# Patient Record
Sex: MALE | Race: WHITE | URBAN - METROPOLITAN AREA
[De-identification: names, ages, dates, MRNs, and addresses within clinical notes are randomized per-mention and may not be internally consistent; named-entity substitution may affect disease eponyms.]

---

## 2017-02-08 ENCOUNTER — INPATIENT (INPATIENT)
Facility: HOSPITAL | Age: 62
LOS: 1 days | Discharge: HOME | End: 2017-02-10
Attending: INTERNAL MEDICINE | Admitting: INTERNAL MEDICINE
Payer: MEDICARE

## 2017-02-08 VITALS
DIASTOLIC BLOOD PRESSURE: 94 MMHG | RESPIRATION RATE: 15 BRPM | TEMPERATURE: 99 F | WEIGHT: 270.07 LBS | HEART RATE: 82 BPM | HEIGHT: 70 IN | SYSTOLIC BLOOD PRESSURE: 140 MMHG

## 2017-02-08 DIAGNOSIS — F31.9 BIPOLAR DISORDER, UNSPECIFIED: ICD-10-CM

## 2017-02-08 DIAGNOSIS — I50.9 HEART FAILURE, UNSPECIFIED: ICD-10-CM

## 2017-02-08 DIAGNOSIS — I10 ESSENTIAL (PRIMARY) HYPERTENSION: ICD-10-CM

## 2017-02-08 DIAGNOSIS — Z98.890 OTHER SPECIFIED POSTPROCEDURAL STATES: Chronic | ICD-10-CM

## 2017-02-08 DIAGNOSIS — R07.9 CHEST PAIN, UNSPECIFIED: ICD-10-CM

## 2017-02-08 LAB
ALBUMIN SERPL ELPH-MCNC: 3.5 G/DL — SIGNIFICANT CHANGE UP (ref 3.3–5)
ALP SERPL-CCNC: 66 U/L — SIGNIFICANT CHANGE UP (ref 40–120)
ALT FLD-CCNC: 24 U/L — SIGNIFICANT CHANGE UP (ref 12–78)
ANION GAP SERPL CALC-SCNC: 9 MMOL/L — SIGNIFICANT CHANGE UP (ref 5–17)
APTT BLD: 30.7 SEC — SIGNIFICANT CHANGE UP (ref 27.5–37.4)
AST SERPL-CCNC: 25 U/L — SIGNIFICANT CHANGE UP (ref 15–37)
BASOPHILS # BLD AUTO: 0.2 K/UL — SIGNIFICANT CHANGE UP (ref 0–0.2)
BASOPHILS NFR BLD AUTO: 1.9 % — SIGNIFICANT CHANGE UP (ref 0–2)
BILIRUB SERPL-MCNC: 0.5 MG/DL — SIGNIFICANT CHANGE UP (ref 0.2–1.2)
BUN SERPL-MCNC: 18 MG/DL — SIGNIFICANT CHANGE UP (ref 7–23)
CALCIUM SERPL-MCNC: 8.9 MG/DL — SIGNIFICANT CHANGE UP (ref 8.5–10.1)
CHLORIDE SERPL-SCNC: 105 MMOL/L — SIGNIFICANT CHANGE UP (ref 96–108)
CK SERPL-CCNC: 193 U/L — SIGNIFICANT CHANGE UP (ref 26–308)
CO2 SERPL-SCNC: 26 MMOL/L — SIGNIFICANT CHANGE UP (ref 22–31)
CREAT SERPL-MCNC: 0.97 MG/DL — SIGNIFICANT CHANGE UP (ref 0.5–1.3)
D DIMER BLD IA.RAPID-MCNC: <150 NG/ML DDU — SIGNIFICANT CHANGE UP
EOSINOPHIL # BLD AUTO: 0.3 K/UL — SIGNIFICANT CHANGE UP (ref 0–0.5)
EOSINOPHIL NFR BLD AUTO: 2.7 % — SIGNIFICANT CHANGE UP (ref 0–6)
GLUCOSE SERPL-MCNC: 98 MG/DL — SIGNIFICANT CHANGE UP (ref 70–99)
HCT VFR BLD CALC: 42.7 % — SIGNIFICANT CHANGE UP (ref 39–50)
HGB BLD-MCNC: 14.3 G/DL — SIGNIFICANT CHANGE UP (ref 13–17)
INR BLD: 0.97 RATIO — SIGNIFICANT CHANGE UP (ref 0.88–1.16)
LYMPHOCYTES # BLD AUTO: 2.5 K/UL — SIGNIFICANT CHANGE UP (ref 1–3.3)
LYMPHOCYTES # BLD AUTO: 25 % — SIGNIFICANT CHANGE UP (ref 13–44)
MCHC RBC-ENTMCNC: 29.6 PG — SIGNIFICANT CHANGE UP (ref 27–34)
MCHC RBC-ENTMCNC: 33.5 GM/DL — SIGNIFICANT CHANGE UP (ref 32–36)
MCV RBC AUTO: 88.4 FL — SIGNIFICANT CHANGE UP (ref 80–100)
MONOCYTES # BLD AUTO: 0.6 K/UL — SIGNIFICANT CHANGE UP (ref 0–0.9)
MONOCYTES NFR BLD AUTO: 6.3 % — SIGNIFICANT CHANGE UP (ref 2–14)
NEUTROPHILS # BLD AUTO: 6.5 K/UL — SIGNIFICANT CHANGE UP (ref 1.8–7.4)
NEUTROPHILS NFR BLD AUTO: 64.1 % — SIGNIFICANT CHANGE UP (ref 43–77)
NT-PROBNP SERPL-SCNC: 86 PG/ML — SIGNIFICANT CHANGE UP (ref 0–125)
PLATELET # BLD AUTO: 340 K/UL — SIGNIFICANT CHANGE UP (ref 150–400)
POTASSIUM SERPL-MCNC: 4.7 MMOL/L — SIGNIFICANT CHANGE UP (ref 3.5–5.3)
POTASSIUM SERPL-SCNC: 4.7 MMOL/L — SIGNIFICANT CHANGE UP (ref 3.5–5.3)
PROT SERPL-MCNC: 6.7 GM/DL — SIGNIFICANT CHANGE UP (ref 6–8.3)
PROTHROM AB SERPL-ACNC: 10.7 SEC — SIGNIFICANT CHANGE UP (ref 10–13.1)
RBC # BLD: 4.83 M/UL — SIGNIFICANT CHANGE UP (ref 4.2–5.8)
RBC # FLD: 12.3 % — SIGNIFICANT CHANGE UP (ref 10.3–14.5)
SODIUM SERPL-SCNC: 140 MMOL/L — SIGNIFICANT CHANGE UP (ref 135–145)
TROPONIN I SERPL-MCNC: <0.015 NG/ML — SIGNIFICANT CHANGE UP (ref 0.01–0.04)
TROPONIN I SERPL-MCNC: <0.015 NG/ML — SIGNIFICANT CHANGE UP (ref 0.01–0.04)
WBC # BLD: 10.1 K/UL — SIGNIFICANT CHANGE UP (ref 3.8–10.5)
WBC # FLD AUTO: 10.1 K/UL — SIGNIFICANT CHANGE UP (ref 3.8–10.5)

## 2017-02-08 PROCEDURE — 99285 EMERGENCY DEPT VISIT HI MDM: CPT

## 2017-02-08 PROCEDURE — 93010 ELECTROCARDIOGRAM REPORT: CPT

## 2017-02-08 PROCEDURE — 71010: CPT | Mod: 26

## 2017-02-08 RX ORDER — NITROGLYCERIN 6.5 MG
0.4 CAPSULE, EXTENDED RELEASE ORAL
Qty: 0 | Refills: 0 | Status: DISCONTINUED | OUTPATIENT
Start: 2017-02-08 | End: 2017-02-10

## 2017-02-08 RX ORDER — ACETAMINOPHEN 500 MG
650 TABLET ORAL EVERY 6 HOURS
Qty: 0 | Refills: 0 | Status: DISCONTINUED | OUTPATIENT
Start: 2017-02-08 | End: 2017-02-10

## 2017-02-08 RX ORDER — DILTIAZEM HCL 120 MG
120 CAPSULE, EXT RELEASE 24 HR ORAL DAILY
Qty: 0 | Refills: 0 | Status: DISCONTINUED | OUTPATIENT
Start: 2017-02-08 | End: 2017-02-09

## 2017-02-08 RX ORDER — ASPIRIN/CALCIUM CARB/MAGNESIUM 324 MG
325 TABLET ORAL ONCE
Qty: 0 | Refills: 0 | Status: COMPLETED | OUTPATIENT
Start: 2017-02-08 | End: 2017-02-08

## 2017-02-08 RX ORDER — SODIUM CHLORIDE 9 MG/ML
3 INJECTION INTRAMUSCULAR; INTRAVENOUS; SUBCUTANEOUS ONCE
Qty: 0 | Refills: 0 | Status: COMPLETED | OUTPATIENT
Start: 2017-02-08 | End: 2017-02-08

## 2017-02-08 RX ORDER — FAMOTIDINE 10 MG/ML
20 INJECTION INTRAVENOUS
Qty: 0 | Refills: 0 | Status: DISCONTINUED | OUTPATIENT
Start: 2017-02-08 | End: 2017-02-10

## 2017-02-08 RX ADMIN — SODIUM CHLORIDE 3 MILLILITER(S): 9 INJECTION INTRAMUSCULAR; INTRAVENOUS; SUBCUTANEOUS at 20:15

## 2017-02-08 NOTE — ED PROVIDER NOTE - OBJECTIVE STATEMENT
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 Pt states on average pain last 2+ hours. Pt states breathing makes the pain worse but No SOB. +palpitations +lightheadedness. No LOC or orthopnea. Pt states he checked his BP over the weekend and was as high as 237/137.  Pt states he takes Cardizem, and took some extra this weekend. +cigarettes 1ppd. No SOB, HA, fever, N/V/D, abd pain, chills. PMD Sacolerios. 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 Pt states on average pain last 2+ hours. Pt states breathing makes the pain worse but No SOB. +palpitations +lightheadedness. No LOC or orthopnea. Pt states he checked his BP over the weekend and was as high as 237/137.  Pt states he takes Cardizem, and took some extra this weekend. +cigarettes 1ppd. No SOB, HA, fever, N/V/D, abd pain, chills. PMD Sakolerios.

## 2017-02-08 NOTE — H&P ADULT - PMH
Anxiety    Bipolar disorder    CHF with unknown LVEF    HTN (hypertension)    PAD (peripheral artery disease)    Poor historian

## 2017-02-08 NOTE — H&P ADULT - NSHPLABSRESULTS_GEN_ALL_CORE
14.3   10.1  )-----------( 340      ( 08 Feb 2017 16:30 )             42.7     08 Feb 2017 16:30    140    |  105    |  18     ----------------------------<  98     4.7     |  26     |  0.97     Ca    8.9        08 Feb 2017 16:30    TPro  6.7    /  Alb  3.5    /  TBili  0.5    /  DBili  x      /  AST  25     /  ALT  24     /  AlkPhos  66     08 Feb 2017 16:30    CARDIAC MARKERS ( 08 Feb 2017 16:30 )  <0.015 ng/mL / x     / 193 U/L / x     / x          LIVER FUNCTIONS - ( 08 Feb 2017 16:30 )  Alb: 3.5 g/dL / Pro: 6.7 gm/dL / ALK PHOS: 66 U/L / ALT: 24 U/L / AST: 25 U/L / GGT: x           PT/INR - ( 08 Feb 2017 16:30 )   PT: 10.7 sec;   INR: 0.97 ratio         PTT - ( 08 Feb 2017 16:30 )  PTT:30.7 sec

## 2017-02-08 NOTE — H&P ADULT - PROBLEM SELECTOR PLAN 1
admit tele for ACS r/o  cario eval  trop neg x 1- complete trend  EKG nonischemic  prn nitro  aspirin, CCB, ACEI  Utox  f/u cxr

## 2017-02-08 NOTE — H&P ADULT - NSHPPHYSICALEXAM_GEN_ALL_CORE
PHYSICAL EXAM:  Constitutional: NAD, well-groomed, well-developed  HEENT: PERRLA, EOMI, Normal Hearing, MMM  Neck: No LAD, No JVD  Back: Normal spine flexure, No CVA tenderness  Respiratory: CTABCardiovascular: S1 and S2, RRR, no M/G/R  Gastrointestinal: BS+, soft, NT/ND  Extremities: No peripheral edema  Vascular: 2+ peripheral pulses  Neurological: A/O x 3, no focal deficits  Psychiatric: Normal mood, normal affect  Musculoskeletal: 5/5 strength b/l upper and lower extremities  Skin: No rashes

## 2017-02-08 NOTE — ED PROVIDER NOTE - ENMT, MLM
Airway patent, Nasal mucosa clear. Mouth with slightly dry mucosa. Throat has no vesicles, no oropharyngeal exudates and uvula is midline. No JVD

## 2017-02-08 NOTE — ED PROVIDER NOTE - DETAILS:
The scribe's documentation has been prepared under my direction and personally reviewed by me in its entirety. I confirm that the note above accurately reflects all work, treatment, procedures, and medical decision making performed by me (Dr. Staley).

## 2017-02-08 NOTE — ED PROVIDER NOTE - MUSCULOSKELETAL, MLM
Spine appears normal, range of motion is not limited, no muscle or joint tenderness. WOO x4. No edema

## 2017-02-08 NOTE — H&P ADULT - HISTORY OF PRESENT ILLNESS
61 yo M hx of bipolar, depression, anxiety, htn, reported hx of CHF unknown EF p/w chest pain.  Pt states that he has been having chest pain for years worse over the last 2 days.  Pt reports pain as substernal radiates to the left jaw a/w diaphoresis, mild nausea , lightheadedness, palpitations, no sob.  Pain occurs randomly nonexertional.  Pain can last for days then resolves without intervention.  He reports checkign his blood pressure at his brothers house and was 230/?.  He reports that for the last year he has not been having any psychiatric problems and is now off all psych meds.  He denies drug use.

## 2017-02-08 NOTE — ED PROVIDER NOTE - CONSTITUTIONAL, MLM
normal... Well appearing, well nourished, awake, alert, oriented to person, place, time/situation and in no apparent distress. +overweight No resp discomfort. Non toxic.

## 2017-02-08 NOTE — ED PROVIDER NOTE - MEDICAL DECISION MAKING DETAILS
63 yo WM, HTN, PVD, hx, ex-smoker, ambulatory to ED w/ cp.  EKG, CE #1 negativemedically warrants Tele adm. for further cardiac monitoring, serial Gregory, consider Cardio consult. 61 yo WM, HTN, PVD, hx, ex-smoker, ambulatory to ED w/ cp.  EKG, CE #1 negative.  Pt medically warrants Tele adm. for further cardiac monitoring, serial Gregory, consider Cardio consult.

## 2017-02-08 NOTE — ED ADULT NURSE NOTE - ED STAT RN HANDOFF DETAILS
Care of pt given to A labate RN, pt A & O x3, VSS, resp easy, non-labored, skin pink warm & dry, bed rails up.

## 2017-02-08 NOTE — ED PROVIDER NOTE - NS ED MD SCRIBE ATTENDING SCRIBE SECTIONS
HISTORY OF PRESENT ILLNESS/REVIEW OF SYSTEMS/DISPOSITION/PHYSICAL EXAM/PAST MEDICAL/SURGICAL/SOCIAL HISTORY

## 2017-02-09 DIAGNOSIS — I50.32 CHRONIC DIASTOLIC (CONGESTIVE) HEART FAILURE: ICD-10-CM

## 2017-02-09 LAB
AMPHET UR-MCNC: NEGATIVE — SIGNIFICANT CHANGE UP
BARBITURATES UR SCN-MCNC: NEGATIVE — SIGNIFICANT CHANGE UP
BENZODIAZ UR-MCNC: NEGATIVE — SIGNIFICANT CHANGE UP
COCAINE METAB.OTHER UR-MCNC: NEGATIVE — SIGNIFICANT CHANGE UP
METHADONE UR-MCNC: NEGATIVE — SIGNIFICANT CHANGE UP
OPIATES UR-MCNC: NEGATIVE — SIGNIFICANT CHANGE UP
PCP SPEC-MCNC: SIGNIFICANT CHANGE UP
PCP UR-MCNC: NEGATIVE — SIGNIFICANT CHANGE UP
THC UR QL: NEGATIVE — SIGNIFICANT CHANGE UP
TROPONIN I SERPL-MCNC: <0.015 NG/ML — SIGNIFICANT CHANGE UP (ref 0.01–0.04)

## 2017-02-09 PROCEDURE — 93306 TTE W/DOPPLER COMPLETE: CPT | Mod: 26

## 2017-02-09 PROCEDURE — 93010 ELECTROCARDIOGRAM REPORT: CPT

## 2017-02-09 PROCEDURE — 71275 CT ANGIOGRAPHY CHEST: CPT | Mod: 26

## 2017-02-09 PROCEDURE — 71020: CPT | Mod: 26

## 2017-02-09 RX ORDER — METOPROLOL TARTRATE 50 MG
25 TABLET ORAL
Qty: 0 | Refills: 0 | Status: DISCONTINUED | OUTPATIENT
Start: 2017-02-09 | End: 2017-02-10

## 2017-02-09 RX ORDER — LOSARTAN POTASSIUM 100 MG/1
50 TABLET, FILM COATED ORAL DAILY
Qty: 0 | Refills: 0 | Status: DISCONTINUED | OUTPATIENT
Start: 2017-02-09 | End: 2017-02-10

## 2017-02-09 RX ORDER — ASPIRIN/CALCIUM CARB/MAGNESIUM 324 MG
81 TABLET ORAL DAILY
Qty: 0 | Refills: 0 | Status: DISCONTINUED | OUTPATIENT
Start: 2017-02-09 | End: 2017-02-10

## 2017-02-09 RX ORDER — FLUTICASONE PROPIONATE 50 MCG
1 SPRAY, SUSPENSION NASAL
Qty: 0 | Refills: 0 | Status: DISCONTINUED | OUTPATIENT
Start: 2017-02-09 | End: 2017-02-10

## 2017-02-09 RX ADMIN — Medication 1 SPRAY(S): at 17:57

## 2017-02-09 RX ADMIN — Medication 5 MILLIGRAM(S): at 06:05

## 2017-02-09 RX ADMIN — FAMOTIDINE 20 MILLIGRAM(S): 10 INJECTION INTRAVENOUS at 17:57

## 2017-02-09 RX ADMIN — LOSARTAN POTASSIUM 50 MILLIGRAM(S): 100 TABLET, FILM COATED ORAL at 12:35

## 2017-02-09 RX ADMIN — Medication 81 MILLIGRAM(S): at 18:06

## 2017-02-09 RX ADMIN — FAMOTIDINE 20 MILLIGRAM(S): 10 INJECTION INTRAVENOUS at 06:05

## 2017-02-09 RX ADMIN — Medication 25 MILLIGRAM(S): at 21:44

## 2017-02-09 RX ADMIN — Medication 120 MILLIGRAM(S): at 06:05

## 2017-02-09 RX ADMIN — Medication 650 MILLIGRAM(S): at 23:30

## 2017-02-09 RX ADMIN — Medication 650 MILLIGRAM(S): at 23:12

## 2017-02-09 NOTE — PROGRESS NOTE ADULT - PROBLEM SELECTOR PLAN 1
ACS ruled out, PE ruled, Dissection ruled out  Echo no WMA  cario eval appreciated  trop neg x 3  EKG nonischemic  - utox neg- ok to start BB  prn nitro  aspirin, statin, BB

## 2017-02-09 NOTE — CONSULT NOTE ADULT - ASSESSMENT
1 Chest pain for several days, chest pain is constant & at times is pleuritic, his CPK, troponin I & EKG  are normal , he is now pain free.  2 h/o non obstructive CAD in the past.  3 HTN  4 h/o mild LV dysfunction & moderate MR  Suggest  Observe patient on telemetry.  Continue with current cardiac medications.  Stop vasotec & start losartan & start lopressor 25 mg BID.  Continue with statins & aspirin.  Since pt has pleuritic chest pain & cough CTA of chest.  Advised pt to stop smoking  Follow-up with CPK, troponin I and EKG.  Echocardiogram to evaluate LVEF and wall motion.

## 2017-02-09 NOTE — PROGRESS NOTE ADULT - SUBJECTIVE AND OBJECTIVE BOX
PCP: Andrez    CHIEF COMPLAINT: chest pain    SUBJECTIVE:  no pain today.  no sob.     REVIEW OF SYSTEMS: All other review of systems is negative unless indicated above    Vital Signs Last 24 Hrs  T(C): 36.4, Max: 37.2 (02-09 @ 06:03)  T(F): 97.5, Max: 98.9 (02-09 @ 06:03)  HR: 90 (78 - 90)  BP: 130/91 (127/75 - 152/84)  BP(mean): --  RR: 17 (16 - 18)  SpO2: 86% (86% - 99%)    I&O's Summary      CAPILLARY BLOOD GLUCOSE      PHYSICAL EXAM:  Constitutional: NAD, awake and alert  HEENT: EOMI, Normal Hearing, MMM  Neck: Soft and supple, No JVD  Respiratory: Breath sounds are clear bilaterally, No wheezing, rales or rhonchi  Cardiovascular: S1 and S2, regular rate and rhythm, no Murmurs, gallops or rubs  Gastrointestinal: Bowel Sounds present, soft, nontender, nondistended, no guarding, no rebound  Extremities: No peripheral edema  Vascular: 2+ peripheral pulses  Neurological: A/O x 3, no focal deficits  Musculoskeletal: 5/5 strength b/l upper and lower extremities  Skin: No rashes    MEDICATIONS:  MEDICATIONS  (STANDING):  famotidine    Tablet 20milliGRAM(s) Oral two times a day  losartan 50milliGRAM(s) Oral daily  fluticasone propionate 50 MICROgram(s)/spray Nasal Spray 1Spray(s) Both Nostrils two times a day  aspirin  chewable 81milliGRAM(s) Oral daily  metoprolol 25milliGRAM(s) Oral two times a day      LABS: All Labs Reviewed:                        14.3   10.1  )-----------( 340      ( 08 Feb 2017 16:30 )             42.7     08 Feb 2017 16:30    140    |  105    |  18     ----------------------------<  98     4.7     |  26     |  0.97     Ca    8.9        08 Feb 2017 16:30    TPro  6.7    /  Alb  3.5    /  TBili  0.5    /  DBili  x      /  AST  25     /  ALT  24     /  AlkPhos  66     08 Feb 2017 16:30    PT/INR - ( 08 Feb 2017 16:30 )   PT: 10.7 sec;   INR: 0.97 ratio         PTT - ( 08 Feb 2017 16:30 )  PTT:30.7 sec  CARDIAC MARKERS ( 09 Feb 2017 00:08 )  <0.015 ng/mL / x     / x     / x     / x      CARDIAC MARKERS ( 08 Feb 2017 21:05 )  <0.015 ng/mL / x     / x     / x     / x      CARDIAC MARKERS ( 08 Feb 2017 16:30 )  <0.015 ng/mL / x     / 193 U/L / x     / x          2D Echo  Summary:   Normal aortic valve structure and function.   The left atrium is mildly dilated.   The left ventricle is normal in size, wall motion and contractility.   Mild concentric left ventricular hypertrophy is present.   Estimated left ventricular ejection fraction is 55-60 %.     EA reversal of the mitral inflow consistent with reduced compliance of  the   left ventricle.     Trace mitral regurgitation is present.     Normal pulmonic valve structure and function.   Normal right atrium.   Normal right ventricle structure and function.   Normal tricuspid valve structure and function.          CTA Chest  IMPRESSION:   No pulmonary embolism.  No acute pleural or pericardial abnormality.

## 2017-02-09 NOTE — CONSULT NOTE ADULT - SUBJECTIVE AND OBJECTIVE BOX
Patient is a 62y old  Male who presents with a chief complaint of chest pain  for the pat 4 to 5 days      HPI:  61 yo M hx of bipolar, depression, anxiety, HTN, reported hx of diastolic  CHF in the past LVEF 40 % & non non  obstructive CAD by cardiac cath in 2008   p/w chest pain.  Pt states that he has been having chest pain for years worse over the last 4 to 5  days.  Pt reports pain as substernal radiates to the left jaw & times chest pain is pleuritic , this pain lasted all day 2 days ago & was constant yesterday , mild nausea , lightheadedness, palpitations, no sob.  Pain occurs randomly & is nor replated to exertion , meals or posture   Pain can last for days then resolves without intervention.  He reports his blood pressure at his brothers house and was 230 systolic .  He reports that for the last year he has not been having any psychiatric problems and is now off all psych meds.  He denies drug  & ETOH use. He has a h/o ETOH abuse in  the past. He feels better since admission & he is now painfree. He c/o dry cough on & off for several months.      PAST MEDICAL & SURGICAL HISTORY:  CHF with unknown LVEF  Anxiety  Bipolar disorder  PAD (peripheral artery disease)  HTN (hypertension)  Poor historian  H/O knee surgery  H/O hernia repair  Poor historian          MEDICATIONS  (STANDING):  aspirin 325milliGRAM(s) Oral once  famotidine    Tablet 20milliGRAM(s) Oral two times a day  diltiazem   CD 120milliGRAM(s) Oral daily  losartan 50milliGRAM(s) Oral daily    MEDICATIONS  (PRN):  nitroglycerin     SubLingual 0.4milliGRAM(s) SubLingual every 5 minutes PRN Chest Pain  acetaminophen   Tablet. 650milliGRAM(s) Oral every 6 hours PRN Moderate Pain (4 - 6)      FAMILY HISTORY:  Family history of heart attack (Father): Father- 52      SOCIAL HISTORY:  He smokes 1PPD but denies any ETOH    REVIEW OF SYSTEM:  Pertinent items are noted in HPI.  Constitutional	Negative for chills, fevers, sweats.    Eyes: 	Negative for visual disturbance.  Ears, nose, mouth, throat, and face: Negative for epistaxis, nasal congestion, sore throat and tinnitus.  Neck:	Negative for enlargement, pain and difficulty in swallowing  Respiration : Negative for cough, dyspnea on exertion, pleuritic chest pain and wheezing  Cardiovascular: Notable  for chest pain    Gastrointestinal : Negative for abdominal pain, diarrhea, nausea and vomiting  Genitourinary: Negative for dysuria, frequency and urinary incontinence .  Skin: Negative for  rash, pruritus, swelling, dryness .  	  Hematologic/lymphatic: Negative for bleeding and easy bruising  Musculoskeletal: Negative for arthralgias, back pain and muscle weakness.  Neurological: Negative for dizziness, headaches, seizures and tremors  Behavioral/Psych: Negative for mood change, depression.  Endocrine:	Negative for blurry vision, polydipsia and polyuria, diaphoresis.   Allergic/Immunologic:	Negative for anaphylaxis, angioedema and urticaria.      Vital Signs Last 24 Hrs  T(C): 37.2, Max: 37.2 (02-09 @ 06:03)  T(F): 98.9, Max: 98.9 (02-09 @ 06:03)  HR: 78 (78 - 87)  BP: 152/83 (129/78 - 152/84)  BP(mean): --  RR: 16 (15 - 18)  SpO2: 95% (95% - 99%)        PHYSICAL EXAM  General Appearance: cooperative, no acute distress,   HEENT: PERRL, conjunctiva clear, EOM's intact, non injected pharynx, no exudate, TM   normal  Neck: Supple, , no adenopathy, thyroid: not enlarged, no carotid bruit or JVD  Back: Symmetric, no  tenderness soft tissue tenderness  Lungs: Clear to auscultation bilateral adventitious breath sounds, normal   expiratory phase  Heart: Regular rate and rhythm, S1, S2 normal, no murmur, rub or gallop  Abdomen: Soft, non-tender, bowel sounds active , no hepatosplenomegaly  Extremities: no cyanosis or edema, no joint swelling  Skin: Skin color, texture normal, no rashes   Neurologic: Alert and oriented X3 , cranial nerves intact, sensory and motor normal,        INTERPRETATION OF TELEMETRY: NSR    ECG:NSR old IWMI         LABS:                          14.3   10.1  )-----------( 340      ( 08 Feb 2017 16:30 )             42.7     08 Feb 2017 16:30    140    |  105    |  18     ----------------------------<  98     4.7     |  26     |  0.97     Ca    8.9        08 Feb 2017 16:30    TPro  6.7    /  Alb  3.5    /  TBili  0.5    /  DBili  x      /  AST  25     /  ALT  24     /  AlkPhos  66     08 Feb 2017 16:30    CARDIAC MARKERS ( 09 Feb 2017 00:08 )  <0.015 ng/mL / x     / x     / x     / x      CARDIAC MARKERS ( 08 Feb 2017 21:05 )  <0.015 ng/mL / x     / x     / x     / x      CARDIAC MARKERS ( 08 Feb 2017 16:30 )  <0.015 ng/mL / x     / 193 U/L / x     / x            Pro BNP  86 02-08 @ 16:30  D Dimer  <150 02-08 @ 16:30    PT/INR - ( 08 Feb 2017 16:30 )   PT: 10.7 sec;   INR: 0.97 ratio         PTT - ( 08 Feb 2017 16:30 )  PTT:30.7 sec          RADIOLOGY & ADDITIONAL STUDIES:

## 2017-02-10 VITALS — WEIGHT: 270.07 LBS

## 2017-02-10 PROCEDURE — 93010 ELECTROCARDIOGRAM REPORT: CPT

## 2017-02-10 RX ORDER — ASPIRIN/CALCIUM CARB/MAGNESIUM 324 MG
1 TABLET ORAL
Qty: 0 | Refills: 0 | COMMUNITY
Start: 2017-02-10

## 2017-02-10 RX ORDER — LOSARTAN POTASSIUM 100 MG/1
1 TABLET, FILM COATED ORAL
Qty: 30 | Refills: 0 | OUTPATIENT
Start: 2017-02-10 | End: 2017-03-12

## 2017-02-10 RX ORDER — FAMOTIDINE 10 MG/ML
1 INJECTION INTRAVENOUS
Qty: 0 | Refills: 0 | COMMUNITY
Start: 2017-02-10

## 2017-02-10 RX ORDER — ATORVASTATIN CALCIUM 80 MG/1
1 TABLET, FILM COATED ORAL
Qty: 30 | Refills: 0 | OUTPATIENT
Start: 2017-02-10 | End: 2017-03-12

## 2017-02-10 RX ORDER — FLUTICASONE PROPIONATE 50 MCG
1 SPRAY, SUSPENSION NASAL
Qty: 0 | Refills: 0 | COMMUNITY
Start: 2017-02-10

## 2017-02-10 RX ORDER — METOPROLOL TARTRATE 50 MG
1 TABLET ORAL
Qty: 60 | Refills: 0 | OUTPATIENT
Start: 2017-02-10 | End: 2017-03-12

## 2017-02-10 RX ADMIN — Medication 81 MILLIGRAM(S): at 11:19

## 2017-02-10 RX ADMIN — Medication 1 SPRAY(S): at 06:17

## 2017-02-10 RX ADMIN — Medication 25 MILLIGRAM(S): at 06:16

## 2017-02-10 RX ADMIN — LOSARTAN POTASSIUM 50 MILLIGRAM(S): 100 TABLET, FILM COATED ORAL at 06:16

## 2017-02-10 RX ADMIN — FAMOTIDINE 20 MILLIGRAM(S): 10 INJECTION INTRAVENOUS at 06:17

## 2017-02-10 NOTE — DISCHARGE NOTE ADULT - MEDICATION SUMMARY - MEDICATIONS TO TAKE
I will START or STAY ON the medications listed below when I get home from the hospital:    aspirin 81 mg oral tablet, chewable  -- 1 tab(s) by mouth once a day  -- Indication: For Heart attack prevention    Tylenol 325 mg oral tablet  -- 2 tab(s) by mouth every 6 to 8 hours, As Needed  -- Indication: For Pain    losartan 50 mg oral tablet  -- 1 tab(s) by mouth once a day  -- Indication: For CHF    atorvastatin 40 mg oral tablet  -- 1 tab(s) by mouth once a day  -- Avoid grapefruit and grapefruit juice while taking this medication.  Do not take this drug if you are pregnant.  It is very important that you take or use this exactly as directed.  Do not skip doses or discontinue unless directed by your doctor.  Obtain medical advice before taking any non-prescription drugs as some may affect the action of this medication.  Take with food or milk.    -- Indication: For CHF    metoprolol tartrate 25 mg oral tablet  -- 1 tab(s) by mouth 2 times a day  -- Indication: For CHF    famotidine 20 mg oral tablet  -- 1 tab(s) by mouth 2 times a day  -- Indication: For GERD    fluticasone 50 mcg/inh nasal spray  -- 1 spray(s) into nose 2 times a day  -- Indication: For Sinusitis I will START or STAY ON the medications listed below when I get home from the hospital:    aspirin 81 mg oral tablet, chewable  -- 1 tab(s) by mouth once a day  -- Indication: For CHEST PAIN    Tylenol 325 mg oral tablet  -- 2 tab(s) by mouth every 6 to 8 hours, As Needed  -- Indication: For Pain    losartan 50 mg oral tablet  -- 1 tab(s) by mouth once a day  -- Indication: For CHF    atorvastatin 40 mg oral tablet  -- 1 tab(s) by mouth once a day  -- Avoid grapefruit and grapefruit juice while taking this medication.  Do not take this drug if you are pregnant.  It is very important that you take or use this exactly as directed.  Do not skip doses or discontinue unless directed by your doctor.  Obtain medical advice before taking any non-prescription drugs as some may affect the action of this medication.  Take with food or milk.    -- Indication: For CHF    metoprolol tartrate 25 mg oral tablet  -- 1 tab(s) by mouth 2 times a day  -- Indication: For CHF    famotidine 20 mg oral tablet  -- 1 tab(s) by mouth 2 times a day  -- Indication: For GERD    fluticasone 50 mcg/inh nasal spray  -- 1 spray(s) into nose 2 times a day  -- Indication: For sinusitis

## 2017-02-10 NOTE — DISCHARGE NOTE ADULT - PLAN OF CARE
cardiac testing Take aspirin, atorvastatin, Metoprolol as prescribed  Follow up with Dr. Green for stress test outpatient Normal EF  Take Losartan and Metoprolol as prescribed take meds as prescribed

## 2017-02-10 NOTE — PROGRESS NOTE ADULT - ASSESSMENT
1 No further chest pain , his CPK, troponin I & EKG  are normal , he is now pain free.  CTA , echo normal.  2 h/o non obstructive CAD in the past.  3 HTN  4 h/o mild LV dysfunction & moderate MR  Suggest  Observe patient on telemetry.  Continue with current cardiac medications.  Nuclear stress test today,    Advised pt to stop smoking

## 2017-02-10 NOTE — DISCHARGE NOTE ADULT - CARE PLAN
Principal Discharge DX:	Chest pain in adult  Goal:	cardiac testing  Instructions for follow-up, activity and diet:	Take aspirin, atorvastatin, Metoprolol as prescribed  Follow up with Dr. Green for stress test outpatient  Secondary Diagnosis:	Chronic diastolic congestive heart failure  Instructions for follow-up, activity and diet:	Normal EF  Take Losartan and Metoprolol as prescribed  Secondary Diagnosis:	Essential hypertension  Instructions for follow-up, activity and diet:	take meds as prescribed

## 2017-02-10 NOTE — DISCHARGE NOTE ADULT - HOSPITAL COURSE
Pt admitted with chest pain.  ACS, Dissection, PTX, PE all ruled out.  Negative utox.  Chest pain reoslved.  Echo done with normal LV function and diastolic dysfunction (as below).  Was monitored on tele no events.  Pt to have outpatient cardiology f/u for stress testing.      2D Echo:  Normal aortic valve structure and function.   The left atrium is mildly dilated.   The left ventricle is normal in size, wall motion and contractility.   Mild concentric left ventricular hypertrophy is present.   Estimated left ventricular ejection fraction is 55-60 %.   EA reversal of the mitral inflow consistent with reduced compliance of  the   left ventricle.   Trace mitral regurgitation is present.   Normal pulmonic valve structure and function.   Normal right atrium.   Normal right ventricle structure and function.   Normal tricuspid valve structure and function.

## 2017-02-10 NOTE — DISCHARGE NOTE ADULT - PATIENT PORTAL LINK FT
“You can access the [employer] Patient Portal, offered by [facility], by registering with the following website: [email or web address]”

## 2017-02-10 NOTE — DISCHARGE NOTE ADULT - CARE PROVIDER_API CALL
Lalo Green (MD), Cardiovascular Disease; Internal Medicine  [address] [place] [address]  Phone: [phone]  Fax: [phone]    Mariano Maguire (), Internal Medicine  [address] [place] [address]  Phone: [phone]  Fax: [phone]

## 2017-02-10 NOTE — DISCHARGE NOTE ADULT - NS MD DC FALL RISK RISK
For information on Fall & Injury Prevention, visit [email or web address]

## 2017-02-10 NOTE — DISCHARGE NOTE ADULT - MEDICATION SUMMARY - MEDICATIONS TO STOP TAKING
I will STOP taking the medications listed below when I get home from the hospital:    Cardizem  mg/24 hours oral tablet, extended release  -- 1 tab(s) by mouth once a day

## 2017-02-14 DIAGNOSIS — F31.9 BIPOLAR DISORDER, UNSPECIFIED: ICD-10-CM

## 2017-02-14 DIAGNOSIS — I11.0 HYPERTENSIVE HEART DISEASE WITH HEART FAILURE: ICD-10-CM

## 2017-02-14 DIAGNOSIS — R07.9 CHEST PAIN, UNSPECIFIED: ICD-10-CM

## 2017-02-14 DIAGNOSIS — I25.10 ATHEROSCLEROTIC HEART DISEASE OF NATIVE CORONARY ARTERY WITHOUT ANGINA PECTORIS: ICD-10-CM

## 2017-02-14 DIAGNOSIS — I50.32 CHRONIC DIASTOLIC (CONGESTIVE) HEART FAILURE: ICD-10-CM

## 2017-04-20 ENCOUNTER — EMERGENCY (EMERGENCY)
Facility: HOSPITAL | Age: 62
LOS: 0 days | Discharge: HOME | End: 2017-04-20
Attending: EMERGENCY MEDICINE | Admitting: EMERGENCY MEDICINE
Payer: MEDICARE

## 2017-04-20 VITALS
HEART RATE: 73 BPM | DIASTOLIC BLOOD PRESSURE: 74 MMHG | OXYGEN SATURATION: 100 % | SYSTOLIC BLOOD PRESSURE: 138 MMHG | TEMPERATURE: 97 F | RESPIRATION RATE: 18 BRPM | HEIGHT: 72 IN | WEIGHT: 270.07 LBS

## 2017-04-20 VITALS
SYSTOLIC BLOOD PRESSURE: 123 MMHG | TEMPERATURE: 97 F | DIASTOLIC BLOOD PRESSURE: 73 MMHG | RESPIRATION RATE: 15 BRPM | OXYGEN SATURATION: 100 % | HEART RATE: 77 BPM

## 2017-04-20 DIAGNOSIS — Z98.890 OTHER SPECIFIED POSTPROCEDURAL STATES: Chronic | ICD-10-CM

## 2017-04-20 PROBLEM — I10 ESSENTIAL (PRIMARY) HYPERTENSION: Chronic | Status: ACTIVE | Noted: 2017-02-08

## 2017-04-20 PROBLEM — I73.9 PERIPHERAL VASCULAR DISEASE, UNSPECIFIED: Chronic | Status: ACTIVE | Noted: 2017-02-08

## 2017-04-20 LAB
ALBUMIN SERPL ELPH-MCNC: 3.7 G/DL — SIGNIFICANT CHANGE UP (ref 3.3–5)
ALP SERPL-CCNC: 64 U/L — SIGNIFICANT CHANGE UP (ref 40–120)
ALT FLD-CCNC: 26 U/L — SIGNIFICANT CHANGE UP (ref 12–78)
ANION GAP SERPL CALC-SCNC: 9 MMOL/L — SIGNIFICANT CHANGE UP (ref 5–17)
APTT BLD: 28.5 SEC — SIGNIFICANT CHANGE UP (ref 27.5–37.4)
AST SERPL-CCNC: 17 U/L — SIGNIFICANT CHANGE UP (ref 15–37)
BASOPHILS # BLD AUTO: 0.1 K/UL — SIGNIFICANT CHANGE UP (ref 0–0.2)
BASOPHILS NFR BLD AUTO: 1.4 % — SIGNIFICANT CHANGE UP (ref 0–2)
BILIRUB SERPL-MCNC: 0.6 MG/DL — SIGNIFICANT CHANGE UP (ref 0.2–1.2)
BUN SERPL-MCNC: 20 MG/DL — SIGNIFICANT CHANGE UP (ref 7–23)
CALCIUM SERPL-MCNC: 9.1 MG/DL — SIGNIFICANT CHANGE UP (ref 8.5–10.1)
CHLORIDE SERPL-SCNC: 105 MMOL/L — SIGNIFICANT CHANGE UP (ref 96–108)
CO2 SERPL-SCNC: 26 MMOL/L — SIGNIFICANT CHANGE UP (ref 22–31)
CREAT SERPL-MCNC: 0.91 MG/DL — SIGNIFICANT CHANGE UP (ref 0.5–1.3)
EOSINOPHIL # BLD AUTO: 0.1 K/UL — SIGNIFICANT CHANGE UP (ref 0–0.5)
EOSINOPHIL NFR BLD AUTO: 1.2 % — SIGNIFICANT CHANGE UP (ref 0–6)
GLUCOSE SERPL-MCNC: 110 MG/DL — HIGH (ref 70–99)
HCT VFR BLD CALC: 40.2 % — SIGNIFICANT CHANGE UP (ref 39–50)
HGB BLD-MCNC: 13.5 G/DL — SIGNIFICANT CHANGE UP (ref 13–17)
INR BLD: 1.03 RATIO — SIGNIFICANT CHANGE UP (ref 0.88–1.16)
LYMPHOCYTES # BLD AUTO: 2.6 K/UL — SIGNIFICANT CHANGE UP (ref 1–3.3)
LYMPHOCYTES # BLD AUTO: 26.4 % — SIGNIFICANT CHANGE UP (ref 13–44)
MCHC RBC-ENTMCNC: 29.4 PG — SIGNIFICANT CHANGE UP (ref 27–34)
MCHC RBC-ENTMCNC: 33.6 GM/DL — SIGNIFICANT CHANGE UP (ref 32–36)
MCV RBC AUTO: 87.7 FL — SIGNIFICANT CHANGE UP (ref 80–100)
MONOCYTES # BLD AUTO: 0.5 K/UL — SIGNIFICANT CHANGE UP (ref 0–0.9)
MONOCYTES NFR BLD AUTO: 5.5 % — SIGNIFICANT CHANGE UP (ref 2–14)
NEUTROPHILS # BLD AUTO: 6.5 K/UL — SIGNIFICANT CHANGE UP (ref 1.8–7.4)
NEUTROPHILS NFR BLD AUTO: 65.4 % — SIGNIFICANT CHANGE UP (ref 43–77)
PLATELET # BLD AUTO: 315 K/UL — SIGNIFICANT CHANGE UP (ref 150–400)
POTASSIUM SERPL-MCNC: 4.1 MMOL/L — SIGNIFICANT CHANGE UP (ref 3.5–5.3)
POTASSIUM SERPL-SCNC: 4.1 MMOL/L — SIGNIFICANT CHANGE UP (ref 3.5–5.3)
PROT SERPL-MCNC: 6.8 GM/DL — SIGNIFICANT CHANGE UP (ref 6–8.3)
PROTHROM AB SERPL-ACNC: 11.1 SEC — SIGNIFICANT CHANGE UP (ref 9.8–12.7)
RBC # BLD: 4.58 M/UL — SIGNIFICANT CHANGE UP (ref 4.2–5.8)
RBC # FLD: 12 % — SIGNIFICANT CHANGE UP (ref 10.3–14.5)
SODIUM SERPL-SCNC: 140 MMOL/L — SIGNIFICANT CHANGE UP (ref 135–145)
TROPONIN I SERPL-MCNC: <0.015 NG/ML — SIGNIFICANT CHANGE UP (ref 0.01–0.04)
TROPONIN I SERPL-MCNC: <0.015 NG/ML — SIGNIFICANT CHANGE UP (ref 0.01–0.04)
WBC # BLD: 9.9 K/UL — SIGNIFICANT CHANGE UP (ref 3.8–10.5)
WBC # FLD AUTO: 9.9 K/UL — SIGNIFICANT CHANGE UP (ref 3.8–10.5)

## 2017-04-20 PROCEDURE — 99285 EMERGENCY DEPT VISIT HI MDM: CPT

## 2017-04-20 PROCEDURE — 71010: CPT | Mod: 26

## 2017-04-20 PROCEDURE — 93010 ELECTROCARDIOGRAM REPORT: CPT | Mod: 76

## 2017-04-20 NOTE — ED PROVIDER NOTE - NS ED MD SCRIBE ATTENDING SCRIBE SECTIONS
PAST MEDICAL/SURGICAL/SOCIAL HISTORY/RESULTS/PROGRESS NOTE/DISPOSITION/REVIEW OF SYSTEMS/HISTORY OF PRESENT ILLNESS/PHYSICAL EXAM

## 2017-04-20 NOTE — ED PROVIDER NOTE - CONSTITUTIONAL, MLM
normal... Well appearing, overweight adult male awake, alert, oriented to person, place, time/situation and in no apparent distress.

## 2017-04-20 NOTE — ED ADULT NURSE NOTE - CHPI ED SYMPTOMS NEG
no chills/no cough/no diaphoresis/no vomiting/no dizziness/no nausea/no fever/no shortness of breath/no back pain/no syncope

## 2017-04-20 NOTE — ED PROVIDER NOTE - OBJECTIVE STATEMENT
61 y/o M with a PMHx of CHF, HTN, HLD presents to the ED c/o severe CP and SOB that started earlier today. Pt states that he started experiencing the pain after he ate lunch with shakiness and diaphoresis. Pt states that he was recently here in February for similar symptoms. Pt states that he was seen by Dr. Green yesterday for stress test with no known results yet. Pt states that he took NTG and  x4 81mg ASA today with little alleviation, currently calm and denies HA, fever, cough, chills, or any other acute c/o at this time.

## 2017-04-20 NOTE — ED PROVIDER NOTE - PROGRESS NOTE DETAILS
Pt has normal troponin,   EKG wnl, CXR negative, BNP in the 100s.  Will recheck troponin s/p 3 hours.  If normal can be sent home.  Pt has an appoitnment with his cardiologist this coming week to evaluate results of his stress test. Pt says this chest pain is similar to the CP he has been getting for the past year.  Unlikely ACS given clinical hx and unremarkable labs/EKG. Received signout from Dr. Nava.  Awaiting 2nd troponin.  2nd troponin undetectable.  Pt NAD on reeval.  Okay for d/c home.  F/u with PCP in 1 week.  Return precautions given.  Pt understands and agrees with plan.

## 2017-04-21 DIAGNOSIS — R07.9 CHEST PAIN, UNSPECIFIED: ICD-10-CM

## 2017-05-03 NOTE — H&P ADULT - ASSESSMENT
62 year old male with PMHx of non obstructive CAD with LHC in 2009 revealing 40% mRCA, nonischemic cardiomyopathy, bipolar, depression, anxiety, htn, reported hx of CHF, ETOH and tobacco use presented to  ED in February with chest pain.     abnormal stress 4/11/17 with small to moderate size basal inferior wall ischemia  echo 2/2017 with mild LVH, EF 55-60% 62 year old male with PMHx of non obstructive CAD with LHC in 2009 revealing 40% mRCA, nonischemic cardiomyopathy, HTN, CHF, bipolar, depression, anxiety, excessive alcohol consumption current smoker (1PPD) with c/o chest pain accompanied with dizziness, SOB, and palpitations. Pt. was recently admitted to [facility] for uncontrolled hypertension. He had an stress which revealed small to medium sized mild severity defect in the mid inferior wall that is reversible and suggestive of ischemia. Pt. now referred for [facility] for further evaluation. 62 year old male with PMHx of non obstructive CAD with LHC in 2009 revealing 40% mRCA, nonischemic cardiomyopathy, HTN, CHF, bipolar, depression, anxiety, excessive alcohol consumption current smoker (1PPD) with c/o chest pain accompanied with dizziness, SOB, and palpitations. Pt. was recently admitted to [facility] for uncontrolled hypertension. He had an stress which revealed small to medium sized mild severity defect in the mid inferior wall that is reversible and suggestive of ischemia. Pt. now referred for C for further evaluation.    LHC risks, benefits and alternatives discussed with patient. Risk discussed included, but not limited to MI, stroke, mortality, major bleeding, arrythmia, or infection. Consent obtained and signed educational material provided. Pt. verbalizes and understands pre-procedural instructions.

## 2017-05-03 NOTE — H&P ADULT - PROBLEM SELECTOR PLAN 1
known or suspected CAD    -[facility] with possible percutaneous coronary intervention and possible sedation and analgesia  -procedure, risks, benefits, alternatives and complications reviewed  -informed consent/ witness signature obtained  -sedation assessment completed  -labs reviewed   -iv hydration

## 2017-05-03 NOTE — H&P ADULT - NSHPPHYSICALEXAM_GEN_ALL_CORE
GENERAL: NAD, well-groomed, well-developed  HEAD:  Atraumatic, Normocephalic  EYES: EOMI, PERRLA, conjunctiva and sclera clear  ENMT: No tonsillar erythema, exudates, or enlargement; Moist mucous membranes, Good dentition, No lesions  NECK: Supple, No JVD, Normal thyroid  NERVOUS SYSTEM:  Alert & Oriented X3, Good concentration; Motor Strength 5/5 B/L upper and lower extremities; DTRs 2+ intact and symmetric  CHEST/LUNG: Clear to auscultation bilaterally; No rales, rhonchi, wheezing, or rubs  HEART: Regular rate and rhythm; No murmurs, rubs, or gallops  ABDOMEN: Soft, Nontender, Nondistended; Bowel sounds present  EXTREMITIES:  +2 B/L LE edema  LYMPH: No lymphadenopathy noted  SKIN: No rashes or lesions

## 2017-05-03 NOTE — H&P ADULT - HISTORY OF PRESENT ILLNESS
62 year old male with PMHx of non obstructive CAD with LHC in 2009 revealing 40% mRCA, nonischemic cardiomyopathy, bipolar, depression, anxiety, htn, reported hx of CHF, ETOH and tobacco use presented to  ED in February with chest pain.     abnormal stress 4/11/17 with small to moderate size basal inferior wall ischemia  echo 2/2017 with mild LVH, EF 55-60% 62 year old male with PMHx of non obstructive CAD with LHC in 2009 revealing 40% mRCA, nonischemic cardiomyopathy, HTN, CHF, bipolar, depression, anxiety, excessive alcohol consumption current smoker (1PPD) with c/o chest pain accompanied with dizziness, SOB, and palpitations. Pt. was recently admitted to [facility] for uncontrolled hypertension. He had an stress which revealed small to medium sized mild severity defect in the mid inferior wall that is reversible and suggestive of ischemia. Pt. now referred for [facility] for further evaluation.

## 2017-05-03 NOTE — H&P ADULT - NSHPREVIEWOFSYSTEMS_GEN_ALL_CORE
CONSTITUTIONAL: No fever, weight loss, or fatigue  EYES: No eye pain, visual disturbances, or discharge  ENMT:  No difficulty hearing, tinnitus, vertigo; No sinus or throat pain  NECK: No pain or stiffness  BREASTS: No pain, masses, or nipple discharge  RESPIRATORY: +shortness of breath  CARDIOVASCULAR: +chest pain, +palpitations, +dizziness  GASTROINTESTINAL: No abdominal or epigastric pain. No nausea, vomiting, or hematemesis; No diarrhea or constipation. No melena or hematochezia.  GENITOURINARY: No dysuria, frequency, hematuria, or incontinence  NEUROLOGICAL: No headaches, memory loss, loss of strength, numbness, or tremors  SKIN: No itching, burning, rashes, or lesions   LYMPH NODES: No enlarged glands  ENDOCRINE: No heat or cold intolerance; No hair loss  MUSCULOSKELETAL: No joint pain or swelling; No muscle, back, or extremity pain  PSYCHIATRIC: No depression, anxiety, mood swings, or difficulty sleeping  HEME/LYMPH: No easy bruising, or bleeding gums  ALLERGY AND IMMUNOLOGIC: No hives or eczema

## 2017-05-04 ENCOUNTER — OUTPATIENT (OUTPATIENT)
Dept: OUTPATIENT SERVICES | Facility: HOSPITAL | Age: 62
LOS: 1 days | Discharge: HOME | End: 2017-05-04

## 2017-05-04 VITALS
HEART RATE: 68 BPM | TEMPERATURE: 98 F | HEIGHT: 72 IN | RESPIRATION RATE: 18 BRPM | OXYGEN SATURATION: 99 % | SYSTOLIC BLOOD PRESSURE: 147 MMHG | WEIGHT: 270.51 LBS | DIASTOLIC BLOOD PRESSURE: 79 MMHG

## 2017-05-04 VITALS
OXYGEN SATURATION: 96 % | SYSTOLIC BLOOD PRESSURE: 140 MMHG | DIASTOLIC BLOOD PRESSURE: 89 MMHG | RESPIRATION RATE: 16 BRPM | HEART RATE: 89 BPM

## 2017-05-04 DIAGNOSIS — R94.39 ABNORMAL RESULT OF OTHER CARDIOVASCULAR FUNCTION STUDY: ICD-10-CM

## 2017-05-04 DIAGNOSIS — Z98.890 OTHER SPECIFIED POSTPROCEDURAL STATES: Chronic | ICD-10-CM

## 2017-05-04 RX ORDER — ACETAMINOPHEN 500 MG
2 TABLET ORAL
Qty: 0 | Refills: 0 | COMMUNITY

## 2017-05-04 NOTE — PROGRESS NOTE ADULT - ASSESSMENT
Patient now s/p angiogram and IVUS of RCA that revealed non obstructive disease.  - medical management  - DC home  - f/u with Dr Fair in 1-2 weeks

## 2017-05-04 NOTE — PROGRESS NOTE ADULT - SUBJECTIVE AND OBJECTIVE BOX
Cardiology NP     Patient is a 62y old  Male who presents with a chief complaint of " I feel as if I have a 1000 pounds on my chest" (03 May 2017 15:25)      HPI:  62 year old male with PMHx of non obstructive CAD with LHC in 2009 revealing 40% mRCA, nonischemic cardiomyopathy, HTN, CHF, bipolar, depression, anxiety, excessive alcohol consumption current smoker (1PPD) with c/o chest pain accompanied with dizziness, SOB, and palpitations. Pt. was recently admitted to [facility] for uncontrolled hypertension. He had an stress which revealed small to medium sized mild severity defect in the mid inferior wall that is reversible and suggestive of ischemia. Pt. now referred for [facility] for further evaluation. (03 May 2017 15:25)      PAST MEDICAL & SURGICAL HISTORY:  CHF with unknown LVEF  Anxiety  Bipolar disorder  PAD (peripheral artery disease)  HTN (hypertension)  Poor historian  H/O knee surgery  H/O hernia repair  Poor historian      MEDICATIONS  (STANDING):    MEDICATIONS  (PRN):      Allergies    No Known Allergies      REVIEW OF SYSTEMS: As mentioned in HPI all others Negative     Vital Signs Last 24 Hrs  T(C): 36.6, Max: 36.6 (05-04 @ 11:26)  T(F): 97.8, Max: 97.8 (05-04 @ 11:26)  HR: 65 (60 - 68)  BP: 148/91 (130/75 - 148/91)  BP(mean): --  RR: 16 (16 - 18)  SpO2: 95% (93% - 99%)    PHYSICAL EXAM:  NERVOUS SYSTEM:  Alert & Oriented X3, Good concentration  CHEST/LUNG: Clear to auscultation bilaterally; No rales, rhonchi, wheezing, or rubs  HEART: Regular rate and rhythm; No murmurs, rubs, or gallops  ABDOMEN: Soft, Nontender, Nondistended; Bowel sounds present  EXTREMITIES:  2+ Peripheral Pulses, No clubbing, cyanosis, or edema  SKIN: Right femoral cath site with failed Perclose device, FemoStop had been applied for 45 min , off at this time , site without bleeding or hematoma,+PP

## 2017-05-12 DIAGNOSIS — I11.0 HYPERTENSIVE HEART DISEASE WITH HEART FAILURE: ICD-10-CM

## 2017-05-12 DIAGNOSIS — I25.10 ATHEROSCLEROTIC HEART DISEASE OF NATIVE CORONARY ARTERY WITHOUT ANGINA PECTORIS: ICD-10-CM

## 2017-05-12 DIAGNOSIS — E66.9 OBESITY, UNSPECIFIED: ICD-10-CM

## 2017-05-12 DIAGNOSIS — I50.9 HEART FAILURE, UNSPECIFIED: ICD-10-CM

## 2017-05-12 DIAGNOSIS — F31.9 BIPOLAR DISORDER, UNSPECIFIED: ICD-10-CM

## 2017-05-12 DIAGNOSIS — E78.00 PURE HYPERCHOLESTEROLEMIA, UNSPECIFIED: ICD-10-CM

## 2017-05-12 DIAGNOSIS — I73.9 PERIPHERAL VASCULAR DISEASE, UNSPECIFIED: ICD-10-CM

## 2017-05-12 DIAGNOSIS — I42.9 CARDIOMYOPATHY, UNSPECIFIED: ICD-10-CM

## 2017-05-12 DIAGNOSIS — Z72.0 TOBACCO USE: ICD-10-CM

## 2017-05-12 DIAGNOSIS — R07.9 CHEST PAIN, UNSPECIFIED: ICD-10-CM

## 2017-07-10 ENCOUNTER — EMERGENCY (EMERGENCY)
Facility: HOSPITAL | Age: 62
LOS: 0 days | Discharge: HOME | End: 2017-07-11
Attending: EMERGENCY MEDICINE | Admitting: EMERGENCY MEDICINE
Payer: MEDICARE

## 2017-07-10 VITALS
HEART RATE: 88 BPM | OXYGEN SATURATION: 98 % | DIASTOLIC BLOOD PRESSURE: 93 MMHG | TEMPERATURE: 99 F | WEIGHT: 270.07 LBS | SYSTOLIC BLOOD PRESSURE: 163 MMHG | RESPIRATION RATE: 22 BRPM

## 2017-07-10 DIAGNOSIS — Z98.890 OTHER SPECIFIED POSTPROCEDURAL STATES: Chronic | ICD-10-CM

## 2017-07-10 DIAGNOSIS — F17.210 NICOTINE DEPENDENCE, CIGARETTES, UNCOMPLICATED: ICD-10-CM

## 2017-07-10 DIAGNOSIS — R07.89 OTHER CHEST PAIN: ICD-10-CM

## 2017-07-10 DIAGNOSIS — R07.9 CHEST PAIN, UNSPECIFIED: ICD-10-CM

## 2017-07-10 DIAGNOSIS — Z98.61 CORONARY ANGIOPLASTY STATUS: ICD-10-CM

## 2017-07-10 DIAGNOSIS — I25.10 ATHEROSCLEROTIC HEART DISEASE OF NATIVE CORONARY ARTERY WITHOUT ANGINA PECTORIS: ICD-10-CM

## 2017-07-10 DIAGNOSIS — I50.9 HEART FAILURE, UNSPECIFIED: ICD-10-CM

## 2017-07-10 DIAGNOSIS — Z79.899 OTHER LONG TERM (CURRENT) DRUG THERAPY: ICD-10-CM

## 2017-07-10 DIAGNOSIS — Z79.82 LONG TERM (CURRENT) USE OF ASPIRIN: ICD-10-CM

## 2017-07-10 DIAGNOSIS — I11.0 HYPERTENSIVE HEART DISEASE WITH HEART FAILURE: ICD-10-CM

## 2017-07-10 LAB
ALBUMIN SERPL ELPH-MCNC: 3.6 G/DL — SIGNIFICANT CHANGE UP (ref 3.3–5)
ALP SERPL-CCNC: 57 U/L — SIGNIFICANT CHANGE UP (ref 40–120)
ALT FLD-CCNC: 23 U/L — SIGNIFICANT CHANGE UP (ref 12–78)
ANION GAP SERPL CALC-SCNC: 9 MMOL/L — SIGNIFICANT CHANGE UP (ref 5–17)
APTT BLD: 28.2 SEC — SIGNIFICANT CHANGE UP (ref 27.5–37.4)
AST SERPL-CCNC: 18 U/L — SIGNIFICANT CHANGE UP (ref 15–37)
BASOPHILS # BLD AUTO: 0.1 K/UL — SIGNIFICANT CHANGE UP (ref 0–0.2)
BASOPHILS NFR BLD AUTO: 0.9 % — SIGNIFICANT CHANGE UP (ref 0–2)
BILIRUB SERPL-MCNC: 0.6 MG/DL — SIGNIFICANT CHANGE UP (ref 0.2–1.2)
BUN SERPL-MCNC: 18 MG/DL — SIGNIFICANT CHANGE UP (ref 7–23)
CALCIUM SERPL-MCNC: 8.7 MG/DL — SIGNIFICANT CHANGE UP (ref 8.5–10.1)
CHLORIDE SERPL-SCNC: 99 MMOL/L — SIGNIFICANT CHANGE UP (ref 96–108)
CO2 SERPL-SCNC: 28 MMOL/L — SIGNIFICANT CHANGE UP (ref 22–31)
CREAT SERPL-MCNC: 0.93 MG/DL — SIGNIFICANT CHANGE UP (ref 0.5–1.3)
EOSINOPHIL # BLD AUTO: 0.1 K/UL — SIGNIFICANT CHANGE UP (ref 0–0.5)
EOSINOPHIL NFR BLD AUTO: 0.7 % — SIGNIFICANT CHANGE UP (ref 0–6)
GLUCOSE SERPL-MCNC: 88 MG/DL — SIGNIFICANT CHANGE UP (ref 70–99)
HCT VFR BLD CALC: 42.6 % — SIGNIFICANT CHANGE UP (ref 39–50)
HGB BLD-MCNC: 14.3 G/DL — SIGNIFICANT CHANGE UP (ref 13–17)
INR BLD: 1.05 RATIO — SIGNIFICANT CHANGE UP (ref 0.88–1.16)
LYMPHOCYTES # BLD AUTO: 1.4 K/UL — SIGNIFICANT CHANGE UP (ref 1–3.3)
LYMPHOCYTES # BLD AUTO: 12 % — LOW (ref 13–44)
MCHC RBC-ENTMCNC: 29.7 PG — SIGNIFICANT CHANGE UP (ref 27–34)
MCHC RBC-ENTMCNC: 33.6 GM/DL — SIGNIFICANT CHANGE UP (ref 32–36)
MCV RBC AUTO: 88.4 FL — SIGNIFICANT CHANGE UP (ref 80–100)
MONOCYTES # BLD AUTO: 0.8 K/UL — SIGNIFICANT CHANGE UP (ref 0–0.9)
MONOCYTES NFR BLD AUTO: 6.8 % — SIGNIFICANT CHANGE UP (ref 2–14)
NEUTROPHILS # BLD AUTO: 9 K/UL — HIGH (ref 1.8–7.4)
NEUTROPHILS NFR BLD AUTO: 79.6 % — HIGH (ref 43–77)
PLATELET # BLD AUTO: 271 K/UL — SIGNIFICANT CHANGE UP (ref 150–400)
POTASSIUM SERPL-MCNC: 4.5 MMOL/L — SIGNIFICANT CHANGE UP (ref 3.5–5.3)
POTASSIUM SERPL-SCNC: 4.5 MMOL/L — SIGNIFICANT CHANGE UP (ref 3.5–5.3)
PROT SERPL-MCNC: 7.1 GM/DL — SIGNIFICANT CHANGE UP (ref 6–8.3)
PROTHROM AB SERPL-ACNC: 11.4 SEC — SIGNIFICANT CHANGE UP (ref 9.8–12.7)
RBC # BLD: 4.82 M/UL — SIGNIFICANT CHANGE UP (ref 4.2–5.8)
RBC # FLD: 11.5 % — SIGNIFICANT CHANGE UP (ref 10.3–14.5)
SODIUM SERPL-SCNC: 136 MMOL/L — SIGNIFICANT CHANGE UP (ref 135–145)
TROPONIN I SERPL-MCNC: <0.015 NG/ML — SIGNIFICANT CHANGE UP (ref 0.01–0.04)
TROPONIN I SERPL-MCNC: <0.015 NG/ML — SIGNIFICANT CHANGE UP (ref 0.01–0.04)
WBC # BLD: 11.2 K/UL — HIGH (ref 3.8–10.5)
WBC # FLD AUTO: 11.2 K/UL — HIGH (ref 3.8–10.5)

## 2017-07-10 PROCEDURE — 71020: CPT | Mod: 26

## 2017-07-10 PROCEDURE — 93010 ELECTROCARDIOGRAM REPORT: CPT

## 2017-07-10 PROCEDURE — 99285 EMERGENCY DEPT VISIT HI MDM: CPT

## 2017-07-10 RX ORDER — SODIUM CHLORIDE 9 MG/ML
3 INJECTION INTRAMUSCULAR; INTRAVENOUS; SUBCUTANEOUS ONCE
Qty: 0 | Refills: 0 | Status: COMPLETED | OUTPATIENT
Start: 2017-07-10 | End: 2017-07-10

## 2017-07-10 RX ORDER — ACETAMINOPHEN 500 MG
650 TABLET ORAL ONCE
Qty: 0 | Refills: 0 | Status: COMPLETED | OUTPATIENT
Start: 2017-07-10 | End: 2017-07-10

## 2017-07-10 RX ADMIN — SODIUM CHLORIDE 3 MILLILITER(S): 9 INJECTION INTRAMUSCULAR; INTRAVENOUS; SUBCUTANEOUS at 20:15

## 2017-07-10 RX ADMIN — Medication 650 MILLIGRAM(S): at 21:03

## 2017-07-10 NOTE — ED PROVIDER NOTE - OBJECTIVE STATEMENT
62 y male presents to the ED with Chest Pain.Pt has a PMHx of CHF and Currently has a Umbilical hernia. Patient has a PSHx of  Angioplasty x4. Patient c/o a heavy pressure on his chest that radiates to his back and left leg. Patient feels that he has a loss energy and has shortness of breath. Patient c/o dizziness and uncontrollable shaking with weakness in extremitie. Patient also has a cough with a past hx of tobacco use (pack per day). Patient states that the discomfort is waxing and weaning and today is worse. Patient says he has sudden onsets of disorientation. PMD Sacalerious, Cardio Baseens Stress test was positive 6 weeks ago took Bayers and 1 Nitro 62 y male presents to the ED with Chest Pain .Pt has a PMHx of CHF and Currently has a Umbilical hernia. Patient has a PSHx of  Angioplasty x4. Patient c/o a heavy pressure on his chest with chest pain that radiates to his back and left leg. Patient feels that he has a loss of energy and has shortness of breath. Patient c/o dizziness and uncontrollable shaking with weakness in extremities. Patient also has a cough with a past hx of tobacco use (pack per day). Patient states that the discomfort is waxing and waning and today is worse. Patient says he has sudden onsets of disorientation. PMD is Dr. Matos and Cardiologist is Dr. Tavares, Patient has had a Stress test that he failed, 6 weeks ago. Patient to took 4 Beyers and 1 Nitro PTA. Patient is alert and aware of surroundings 61 y/o male presents to the ED with Chest Pain. Pt has a PMHx of CHF and Currently has a Umbilical hernia. Patient has a PSHx of  Angioplasty in the past 2 weeks ago with some CAD findings but no stent placement. Patient c/o a heavy pressure on his chest with chest pain that radiates to his back and left leg. Patient feels that he has a loss of energy and has shortness of breath. Patient c/o dizziness and uncontrollable shaking with weakness in extremities. Patient also has a cough with a past hx of tobacco use (pack per day). Patient states that the discomfort is waxing and waning and today is worse. Patient says he has sudden onsets of disorientation today and once last week. PMD is Dr. Matos and Cardiologist is Dr. Chavez.  Stress test failed, 6 weeks ago therefore had angio recently with Dr. Fair. Patient to took 4 baby aspirin and 1 Nitro PTA. Patient is alert and aware of surroundings

## 2017-07-10 NOTE — ED PROVIDER NOTE - MEDICAL DECISION MAKING DETAILS
62 y-o Male Presents to the Chest Pain. Patient will receive EKG and will be monitored for further evaluation 62 y-o Male Presents to the Chest Pain. Will order labs, EKG, CXR, and meds. Then re-assess. 62 y-o Male Presents to the Chest Pain. Will order labs, EKG, CXR, and meds. Then re-assess.  The patient's risk factors for ACS were reviewed as well as the EKG.  The CXR assists in r/o Pneumonia, Pneumothorax, Esophageal Tears.  The patient does not appear to have a Pulmonary Embolism based on the Wells Score and PERC rule and there is no apparent DVT.  There are no signs of Pericarditis, Endocarditis, or Myocarditis based on risk factor analysis.  There is no fever.  There does not appear to be an Aortic Dissection either based on history, physical exam, and signs.

## 2017-07-10 NOTE — ED PROVIDER NOTE - CONSTITUTIONAL, MLM
normal... Well appearing, well nourished, awake, alert, oriented to person, place, time/situation and in no apparent distress. Well appearing,obese, awake, alert, oriented to person, place, time/situation and in no apparent distress. Well appearing, obese, awake, alert, oriented to person, place, time/situation and in no apparent distress.

## 2017-07-10 NOTE — ED PROVIDER NOTE - CHPI ED SYMPTOMS POS
DIZZINESS/CHEST PAIN/CHEST DISCOMFORT CHEST PAIN/COUGH/DIZZINESS/SHORTNESS OF BREATH/CHEST DISCOMFORT

## 2017-07-10 NOTE — ED ADULT NURSE NOTE - OBJECTIVE STATEMENT
Pt presented to ED c/o chest pain. As per pt, pt's been having heavy pressure on the left chest wall for several days which worsen. The pain radiated to back and left leg, and pt also c/o weakness and SOB. Pt adds dizziness and uncontrollable shaking at time along with sudden onsets of disorientation. No other complains at this time.

## 2017-07-11 VITALS
OXYGEN SATURATION: 98 % | DIASTOLIC BLOOD PRESSURE: 87 MMHG | SYSTOLIC BLOOD PRESSURE: 149 MMHG | HEART RATE: 98 BPM | TEMPERATURE: 99 F | RESPIRATION RATE: 20 BRPM

## 2022-12-28 NOTE — ED PROVIDER NOTE - CROS ED EYES ALL NEG
[FreeTextEntry1] : 12/16/20 Venous Doppler: Right leg ASV reflux but ASV is short and tortuous\par \par 12/14/22 bilateral VLE\par Right: neg dvt/svt; + reflux CFV, AGSV, GSV calf, distal calf,  and VV\par Left: neg dvt/svt + reflux CFV, AGSV, GSV calf, distal calf,  and VV\par \par 12/14/22 PVR/KARLOS\par Right: 1.34\par Left: 1.32 negative...

## 2023-03-20 PROBLEM — I50.9 HEART FAILURE, UNSPECIFIED: Chronic | Status: ACTIVE | Noted: 2017-02-08

## 2023-03-20 PROBLEM — F31.9 BIPOLAR DISORDER, UNSPECIFIED: Chronic | Status: ACTIVE | Noted: 2017-02-08

## 2023-03-20 PROBLEM — F41.9 ANXIETY DISORDER, UNSPECIFIED: Chronic | Status: ACTIVE | Noted: 2017-02-08

## 2023-03-22 ENCOUNTER — APPOINTMENT (OUTPATIENT)
Dept: CT IMAGING | Facility: CLINIC | Age: 68
End: 2023-03-22
Payer: MEDICARE

## 2023-03-22 ENCOUNTER — OUTPATIENT (OUTPATIENT)
Dept: OUTPATIENT SERVICES | Facility: HOSPITAL | Age: 68
LOS: 1 days | End: 2023-03-22
Payer: MEDICARE

## 2023-03-22 DIAGNOSIS — K46.9 UNSPECIFIED ABDOMINAL HERNIA WITHOUT OBSTRUCTION OR GANGRENE: ICD-10-CM

## 2023-03-22 DIAGNOSIS — Z98.890 OTHER SPECIFIED POSTPROCEDURAL STATES: Chronic | ICD-10-CM

## 2023-03-22 PROCEDURE — 74176 CT ABD & PELVIS W/O CONTRAST: CPT | Mod: 26

## 2023-03-22 PROCEDURE — 74176 CT ABD & PELVIS W/O CONTRAST: CPT

## 2023-10-10 ENCOUNTER — APPOINTMENT (OUTPATIENT)
Dept: GASTROENTEROLOGY | Facility: CLINIC | Age: 68
End: 2023-10-10

## 2024-02-16 ENCOUNTER — INPATIENT (INPATIENT)
Facility: HOSPITAL | Age: 69
LOS: 2 days | Discharge: HOME | DRG: 394 | End: 2024-02-19
Attending: SURGERY | Admitting: SURGERY
Payer: MEDICARE

## 2024-02-16 VITALS
HEART RATE: 72 BPM | OXYGEN SATURATION: 97 % | HEIGHT: 72 IN | RESPIRATION RATE: 18 BRPM | SYSTOLIC BLOOD PRESSURE: 138 MMHG | WEIGHT: 300.05 LBS | DIASTOLIC BLOOD PRESSURE: 70 MMHG | TEMPERATURE: 98 F

## 2024-02-16 DIAGNOSIS — R33.9 RETENTION OF URINE, UNSPECIFIED: ICD-10-CM

## 2024-02-16 DIAGNOSIS — Z98.890 OTHER SPECIFIED POSTPROCEDURAL STATES: Chronic | ICD-10-CM

## 2024-02-16 DIAGNOSIS — K56.609 UNSPECIFIED INTESTINAL OBSTRUCTION, UNSPECIFIED AS TO PARTIAL VERSUS COMPLETE OBSTRUCTION: ICD-10-CM

## 2024-02-16 LAB
ALBUMIN SERPL ELPH-MCNC: 3.6 G/DL — SIGNIFICANT CHANGE UP (ref 3.3–5)
ALP SERPL-CCNC: 88 U/L — SIGNIFICANT CHANGE UP (ref 40–120)
ALT FLD-CCNC: 21 U/L — SIGNIFICANT CHANGE UP (ref 12–78)
ANION GAP SERPL CALC-SCNC: 5 MMOL/L — SIGNIFICANT CHANGE UP (ref 5–17)
APPEARANCE UR: CLEAR — SIGNIFICANT CHANGE UP
APTT BLD: 32 SEC — SIGNIFICANT CHANGE UP (ref 24.5–35.6)
AST SERPL-CCNC: 14 U/L — LOW (ref 15–37)
BASOPHILS # BLD AUTO: 0.07 K/UL — SIGNIFICANT CHANGE UP (ref 0–0.2)
BASOPHILS NFR BLD AUTO: 0.6 % — SIGNIFICANT CHANGE UP (ref 0–2)
BILIRUB SERPL-MCNC: 0.6 MG/DL — SIGNIFICANT CHANGE UP (ref 0.2–1.2)
BILIRUB UR-MCNC: NEGATIVE — SIGNIFICANT CHANGE UP
BLD GP AB SCN SERPL QL: SIGNIFICANT CHANGE UP
BUN SERPL-MCNC: 21 MG/DL — SIGNIFICANT CHANGE UP (ref 7–23)
CALCIUM SERPL-MCNC: 9.5 MG/DL — SIGNIFICANT CHANGE UP (ref 8.5–10.1)
CHLORIDE SERPL-SCNC: 99 MMOL/L — SIGNIFICANT CHANGE UP (ref 96–108)
CO2 SERPL-SCNC: 30 MMOL/L — SIGNIFICANT CHANGE UP (ref 22–31)
COLOR SPEC: YELLOW — SIGNIFICANT CHANGE UP
CREAT SERPL-MCNC: 0.84 MG/DL — SIGNIFICANT CHANGE UP (ref 0.5–1.3)
DIFF PNL FLD: ABNORMAL
EGFR: 94 ML/MIN/1.73M2 — SIGNIFICANT CHANGE UP
EOSINOPHIL # BLD AUTO: 0.11 K/UL — SIGNIFICANT CHANGE UP (ref 0–0.5)
EOSINOPHIL NFR BLD AUTO: 1 % — SIGNIFICANT CHANGE UP (ref 0–6)
GLUCOSE BLDC GLUCOMTR-MCNC: 218 MG/DL — HIGH (ref 70–99)
GLUCOSE SERPL-MCNC: 220 MG/DL — HIGH (ref 70–99)
GLUCOSE UR QL: NEGATIVE MG/DL — SIGNIFICANT CHANGE UP
HCT VFR BLD CALC: 41.2 % — SIGNIFICANT CHANGE UP (ref 39–50)
HGB BLD-MCNC: 13.7 G/DL — SIGNIFICANT CHANGE UP (ref 13–17)
IMM GRANULOCYTES NFR BLD AUTO: 0.5 % — SIGNIFICANT CHANGE UP (ref 0–0.9)
INR BLD: 0.9 RATIO — SIGNIFICANT CHANGE UP (ref 0.85–1.18)
KETONES UR-MCNC: NEGATIVE MG/DL — SIGNIFICANT CHANGE UP
LACTATE SERPL-SCNC: 2 MMOL/L — SIGNIFICANT CHANGE UP (ref 0.7–2)
LACTATE SERPL-SCNC: 2.1 MMOL/L — HIGH (ref 0.7–2)
LEUKOCYTE ESTERASE UR-ACNC: NEGATIVE — SIGNIFICANT CHANGE UP
LYMPHOCYTES # BLD AUTO: 1.5 K/UL — SIGNIFICANT CHANGE UP (ref 1–3.3)
LYMPHOCYTES # BLD AUTO: 13.2 % — SIGNIFICANT CHANGE UP (ref 13–44)
MAGNESIUM SERPL-MCNC: 2.1 MG/DL — SIGNIFICANT CHANGE UP (ref 1.6–2.6)
MCHC RBC-ENTMCNC: 28.5 PG — SIGNIFICANT CHANGE UP (ref 27–34)
MCHC RBC-ENTMCNC: 33.3 GM/DL — SIGNIFICANT CHANGE UP (ref 32–36)
MCV RBC AUTO: 85.7 FL — SIGNIFICANT CHANGE UP (ref 80–100)
MONOCYTES # BLD AUTO: 0.58 K/UL — SIGNIFICANT CHANGE UP (ref 0–0.9)
MONOCYTES NFR BLD AUTO: 5.1 % — SIGNIFICANT CHANGE UP (ref 2–14)
NEUTROPHILS # BLD AUTO: 9.05 K/UL — HIGH (ref 1.8–7.4)
NEUTROPHILS NFR BLD AUTO: 79.6 % — HIGH (ref 43–77)
NITRITE UR-MCNC: NEGATIVE — SIGNIFICANT CHANGE UP
NT-PROBNP SERPL-SCNC: 53 PG/ML — SIGNIFICANT CHANGE UP (ref 0–125)
PCP SPEC-MCNC: SIGNIFICANT CHANGE UP
PH UR: 7.5 — SIGNIFICANT CHANGE UP (ref 5–8)
PLATELET # BLD AUTO: 367 K/UL — SIGNIFICANT CHANGE UP (ref 150–400)
POTASSIUM SERPL-MCNC: 4.3 MMOL/L — SIGNIFICANT CHANGE UP (ref 3.5–5.3)
POTASSIUM SERPL-SCNC: 4.3 MMOL/L — SIGNIFICANT CHANGE UP (ref 3.5–5.3)
PROT SERPL-MCNC: 7.5 GM/DL — SIGNIFICANT CHANGE UP (ref 6–8.3)
PROT UR-MCNC: SIGNIFICANT CHANGE UP MG/DL
PROTHROM AB SERPL-ACNC: 10.2 SEC — SIGNIFICANT CHANGE UP (ref 9.5–13)
RBC # BLD: 4.81 M/UL — SIGNIFICANT CHANGE UP (ref 4.2–5.8)
RBC # FLD: 13.1 % — SIGNIFICANT CHANGE UP (ref 10.3–14.5)
SODIUM SERPL-SCNC: 134 MMOL/L — LOW (ref 135–145)
SP GR SPEC: >1.03 — HIGH (ref 1–1.03)
TROPONIN I, HIGH SENSITIVITY RESULT: 5.45 NG/L — SIGNIFICANT CHANGE UP
UROBILINOGEN FLD QL: 1 MG/DL — SIGNIFICANT CHANGE UP (ref 0.2–1)
WBC # BLD: 11.37 K/UL — HIGH (ref 3.8–10.5)
WBC # FLD AUTO: 11.37 K/UL — HIGH (ref 3.8–10.5)

## 2024-02-16 PROCEDURE — 84100 ASSAY OF PHOSPHORUS: CPT

## 2024-02-16 PROCEDURE — C9113: CPT

## 2024-02-16 PROCEDURE — 93306 TTE W/DOPPLER COMPLETE: CPT

## 2024-02-16 PROCEDURE — 93010 ELECTROCARDIOGRAM REPORT: CPT

## 2024-02-16 PROCEDURE — 83735 ASSAY OF MAGNESIUM: CPT

## 2024-02-16 PROCEDURE — 81001 URINALYSIS AUTO W/SCOPE: CPT

## 2024-02-16 PROCEDURE — 86803 HEPATITIS C AB TEST: CPT

## 2024-02-16 PROCEDURE — 71045 X-RAY EXAM CHEST 1 VIEW: CPT

## 2024-02-16 PROCEDURE — 80307 DRUG TEST PRSMV CHEM ANLYZR: CPT

## 2024-02-16 PROCEDURE — 71275 CT ANGIOGRAPHY CHEST: CPT | Mod: 26,MA

## 2024-02-16 PROCEDURE — 99221 1ST HOSP IP/OBS SF/LOW 40: CPT

## 2024-02-16 PROCEDURE — 74174 CTA ABD&PLVS W/CONTRAST: CPT | Mod: 26,MA

## 2024-02-16 PROCEDURE — 86900 BLOOD TYPING SEROLOGIC ABO: CPT

## 2024-02-16 PROCEDURE — 71045 X-RAY EXAM CHEST 1 VIEW: CPT | Mod: 26,77

## 2024-02-16 PROCEDURE — 99285 EMERGENCY DEPT VISIT HI MDM: CPT

## 2024-02-16 PROCEDURE — 86850 RBC ANTIBODY SCREEN: CPT

## 2024-02-16 PROCEDURE — 83605 ASSAY OF LACTIC ACID: CPT

## 2024-02-16 PROCEDURE — 80053 COMPREHEN METABOLIC PANEL: CPT

## 2024-02-16 PROCEDURE — 82962 GLUCOSE BLOOD TEST: CPT

## 2024-02-16 PROCEDURE — 83036 HEMOGLOBIN GLYCOSYLATED A1C: CPT

## 2024-02-16 PROCEDURE — 80061 LIPID PANEL: CPT

## 2024-02-16 PROCEDURE — 86901 BLOOD TYPING SEROLOGIC RH(D): CPT

## 2024-02-16 PROCEDURE — 97163 PT EVAL HIGH COMPLEX 45 MIN: CPT | Mod: GP

## 2024-02-16 PROCEDURE — 85025 COMPLETE CBC W/AUTO DIFF WBC: CPT

## 2024-02-16 PROCEDURE — 97116 GAIT TRAINING THERAPY: CPT | Mod: GP

## 2024-02-16 PROCEDURE — 84443 ASSAY THYROID STIM HORMONE: CPT

## 2024-02-16 PROCEDURE — G1004: CPT

## 2024-02-16 PROCEDURE — 80048 BASIC METABOLIC PNL TOTAL CA: CPT

## 2024-02-16 PROCEDURE — 70450 CT HEAD/BRAIN W/O DYE: CPT | Mod: 26,MG

## 2024-02-16 PROCEDURE — 36415 COLL VENOUS BLD VENIPUNCTURE: CPT

## 2024-02-16 PROCEDURE — 85027 COMPLETE CBC AUTOMATED: CPT

## 2024-02-16 PROCEDURE — 71045 X-RAY EXAM CHEST 1 VIEW: CPT | Mod: 26

## 2024-02-16 RX ORDER — ACETAMINOPHEN 500 MG
1000 TABLET ORAL ONCE
Refills: 0 | Status: DISCONTINUED | OUTPATIENT
Start: 2024-02-16 | End: 2024-02-19

## 2024-02-16 RX ORDER — METOPROLOL TARTRATE 50 MG
2.5 TABLET ORAL EVERY 6 HOURS
Refills: 0 | Status: DISCONTINUED | OUTPATIENT
Start: 2024-02-16 | End: 2024-02-18

## 2024-02-16 RX ORDER — SODIUM CHLORIDE 9 MG/ML
1000 INJECTION, SOLUTION INTRAVENOUS
Refills: 0 | Status: DISCONTINUED | OUTPATIENT
Start: 2024-02-16 | End: 2024-02-19

## 2024-02-16 RX ORDER — METOPROLOL TARTRATE 50 MG
2.5 TABLET ORAL EVERY 6 HOURS
Refills: 0 | Status: DISCONTINUED | OUTPATIENT
Start: 2024-02-16 | End: 2024-02-16

## 2024-02-16 RX ORDER — DEXTROSE 50 % IN WATER 50 %
25 SYRINGE (ML) INTRAVENOUS ONCE
Refills: 0 | Status: DISCONTINUED | OUTPATIENT
Start: 2024-02-16 | End: 2024-02-19

## 2024-02-16 RX ORDER — SODIUM CHLORIDE 9 MG/ML
1000 INJECTION INTRAMUSCULAR; INTRAVENOUS; SUBCUTANEOUS ONCE
Refills: 0 | Status: COMPLETED | OUTPATIENT
Start: 2024-02-16 | End: 2024-02-16

## 2024-02-16 RX ORDER — ENOXAPARIN SODIUM 100 MG/ML
40 INJECTION SUBCUTANEOUS EVERY 12 HOURS
Refills: 0 | Status: DISCONTINUED | OUTPATIENT
Start: 2024-02-16 | End: 2024-02-19

## 2024-02-16 RX ORDER — ONDANSETRON 8 MG/1
4 TABLET, FILM COATED ORAL ONCE
Refills: 0 | Status: COMPLETED | OUTPATIENT
Start: 2024-02-16 | End: 2024-02-16

## 2024-02-16 RX ORDER — DEXTROSE 50 % IN WATER 50 %
12.5 SYRINGE (ML) INTRAVENOUS ONCE
Refills: 0 | Status: DISCONTINUED | OUTPATIENT
Start: 2024-02-16 | End: 2024-02-19

## 2024-02-16 RX ORDER — SODIUM CHLORIDE 9 MG/ML
1000 INJECTION INTRAMUSCULAR; INTRAVENOUS; SUBCUTANEOUS
Refills: 0 | Status: DISCONTINUED | OUTPATIENT
Start: 2024-02-16 | End: 2024-02-18

## 2024-02-16 RX ORDER — KETOROLAC TROMETHAMINE 30 MG/ML
15 SYRINGE (ML) INJECTION EVERY 6 HOURS
Refills: 0 | Status: DISCONTINUED | OUTPATIENT
Start: 2024-02-16 | End: 2024-02-19

## 2024-02-16 RX ORDER — MORPHINE SULFATE 50 MG/1
2 CAPSULE, EXTENDED RELEASE ORAL EVERY 4 HOURS
Refills: 0 | Status: DISCONTINUED | OUTPATIENT
Start: 2024-02-16 | End: 2024-02-19

## 2024-02-16 RX ORDER — DEXTROSE 50 % IN WATER 50 %
15 SYRINGE (ML) INTRAVENOUS ONCE
Refills: 0 | Status: DISCONTINUED | OUTPATIENT
Start: 2024-02-16 | End: 2024-02-19

## 2024-02-16 RX ORDER — GLUCAGON INJECTION, SOLUTION 0.5 MG/.1ML
1 INJECTION, SOLUTION SUBCUTANEOUS ONCE
Refills: 0 | Status: DISCONTINUED | OUTPATIENT
Start: 2024-02-16 | End: 2024-02-19

## 2024-02-16 RX ORDER — MORPHINE SULFATE 50 MG/1
4 CAPSULE, EXTENDED RELEASE ORAL ONCE
Refills: 0 | Status: DISCONTINUED | OUTPATIENT
Start: 2024-02-16 | End: 2024-02-16

## 2024-02-16 RX ORDER — INSULIN LISPRO 100/ML
VIAL (ML) SUBCUTANEOUS
Refills: 0 | Status: DISCONTINUED | OUTPATIENT
Start: 2024-02-16 | End: 2024-02-19

## 2024-02-16 RX ORDER — MIDAZOLAM HYDROCHLORIDE 1 MG/ML
1 INJECTION, SOLUTION INTRAMUSCULAR; INTRAVENOUS ONCE
Refills: 0 | Status: DISCONTINUED | OUTPATIENT
Start: 2024-02-16 | End: 2024-02-16

## 2024-02-16 RX ORDER — PANTOPRAZOLE SODIUM 20 MG/1
40 TABLET, DELAYED RELEASE ORAL DAILY
Refills: 0 | Status: DISCONTINUED | OUTPATIENT
Start: 2024-02-16 | End: 2024-02-19

## 2024-02-16 RX ADMIN — MORPHINE SULFATE 4 MILLIGRAM(S): 50 CAPSULE, EXTENDED RELEASE ORAL at 20:41

## 2024-02-16 RX ADMIN — MORPHINE SULFATE 4 MILLIGRAM(S): 50 CAPSULE, EXTENDED RELEASE ORAL at 19:26

## 2024-02-16 RX ADMIN — SODIUM CHLORIDE 1000 MILLILITER(S): 9 INJECTION INTRAMUSCULAR; INTRAVENOUS; SUBCUTANEOUS at 17:14

## 2024-02-16 RX ADMIN — ENOXAPARIN SODIUM 40 MILLIGRAM(S): 100 INJECTION SUBCUTANEOUS at 23:36

## 2024-02-16 RX ADMIN — SODIUM CHLORIDE 1000 MILLILITER(S): 9 INJECTION INTRAMUSCULAR; INTRAVENOUS; SUBCUTANEOUS at 22:03

## 2024-02-16 RX ADMIN — ONDANSETRON 4 MILLIGRAM(S): 8 TABLET, FILM COATED ORAL at 19:26

## 2024-02-16 RX ADMIN — SODIUM CHLORIDE 150 MILLILITER(S): 9 INJECTION INTRAMUSCULAR; INTRAVENOUS; SUBCUTANEOUS at 22:03

## 2024-02-16 RX ADMIN — Medication 2: at 23:37

## 2024-02-16 NOTE — ED ADULT NURSE REASSESSMENT NOTE - NS ED NURSE REASSESS COMMENT FT1
spoke to surgery MD, Dr.de anglin, unable to insert landa after multiple attmepts. As per MD, will contact  for coude placement.

## 2024-02-16 NOTE — ED PROVIDER NOTE - NSICDXPASTMEDICALHX_GEN_ALL_CORE_FT
PAST MEDICAL HISTORY:  Anxiety     Bipolar disorder     CHF with unknown LVEF     HTN (hypertension)     PAD (peripheral artery disease)     Poor historian

## 2024-02-16 NOTE — ED PROVIDER NOTE - OBJECTIVE STATEMENT
68 y/o male with PMHx of CHF with unknown LVEF, anxiety, bipolar disorder, PAD, HTN; presents to ED BIBEMS c/o L-sided chest pain and is s/p syncopal episode today after going to the bathroom preceded by sudden onset dizziness, blurry vision and loss of balance, and is endorsing x2 days of diarrhea and weakness, and abdominal pain. Denies HA. 70 y/o male with PMHx of CHF with unknown LVEF, anxiety, bipolar disorder, PAD, HTN; presents to ED BIBEMS c/o L-sided chest pain and is s/p syncopal episode today after going to the bathroom preceded by multiple episode of diarrhea, abdominal pain, sudden onset dizziness, blurry vision and loss of balance, states diarrhea and weakness x2 days. Denies HA.

## 2024-02-16 NOTE — H&P ADULT - NSHPLABSRESULTS_GEN_ALL_CORE
@ 17:32                    13.7  CBC: 11.37>)-------(<367                     41.2                 134 | 99 | 21    CMP:  ----------------------< 220               4.3 | 30 | 0.84                      Ca:9.5  Phos:-  M.1               0.6|      |14        LFTs:  ------|88|-----             -|      |-      Current Inpatient Medications:  acetaminophen   IVPB .. 1000 milliGRAM(s) IV Intermittent once PRN  dextrose 5%. 1000 milliLiter(s) (100 mL/Hr) IV Continuous <Continuous>  dextrose 5%. 1000 milliLiter(s) (50 mL/Hr) IV Continuous <Continuous>  dextrose 50% Injectable 25 Gram(s) IV Push once  dextrose 50% Injectable 12.5 Gram(s) IV Push once  dextrose 50% Injectable 25 Gram(s) IV Push once  dextrose Oral Gel 15 Gram(s) Oral once PRN  enoxaparin Injectable 40 milliGRAM(s) SubCutaneous every 12 hours  glucagon  Injectable 1 milliGRAM(s) IntraMuscular once  insulin lispro (ADMELOG) corrective regimen sliding scale   SubCutaneous three times a day before meals  ketorolac   Injectable 15 milliGRAM(s) IV Push every 6 hours PRN  metoprolol tartrate IVPB 2.5 milliGRAM(s) IV Intermittent every 6 hours  morphine  - Injectable 2 milliGRAM(s) IV Push every 4 hours PRN  pantoprazole  Injectable 40 milliGRAM(s) IV Push daily  sodium chloride 0.9% Bolus 1000 milliLiter(s) IV Bolus once  sodium chloride 0.9%. 1000 milliLiter(s) (150 mL/Hr) IV Continuous <Continuous>    < from: CT Angio Abdomen and Pelvis w/ IV Cont (24 @ 17:53) >    BOWEL: Small hiatal hernia. Fluid-filled and mildly distendeddistal   esophagus. Stomach is also partially distended with fluid. Proximal small   bowel is nondistended. Mid and distal small bowel are mildly distended.   Mid and distal small bowel enter and exit large complex ventral midline   hernia multiple times. Multiple transitions are identified, for example   at image 190 series 3 with small bowel entering the hernia to the right   of midline, within the hernia sac at images 185 and 196 series 3, and   distally as terminal small bowel exits the hernia on image 175 series 3.   Terminal ileum in the right lower quadrant is completely collapsed.   Findings are concerning for bowel obstruction. Appendix is not   visualized. No secondary sign of acute appendicitis. Mild stool burden of   the colon limits evaluation of the colonic mucosa. Colonic   diverticulosis, without diverticulitis.    < end of copied text >

## 2024-02-16 NOTE — ED ADULT TRIAGE NOTE - CHIEF COMPLAINT QUOTE
Pt A&OX3, BIBEMS with c/o dizziness. EMS reports pt got dizzy and passed out while sitting on the couch. Pt reports having blurry vision that started yesterday. Today around noon had a sudden loss of balance and blurry vision.   Spoke with MD Jarrett, no code stroke called at this time. BGM in triage 237. EKG completed in triage

## 2024-02-16 NOTE — CONSULT NOTE ADULT - ASSESSMENT
Incomplete bladder emptying:  pt is refusing landa placement.   He understands importance of landa catheter to measure output.   He is able to urinate in urinol and provide measured amount.   Discussed with surgical team

## 2024-02-16 NOTE — ED PROVIDER NOTE - PHYSICAL EXAMINATION
GENERAL: A&Ox4, non-toxic appearing, no acute distress  HEENT: NCAT, EOMI, oral mucosa moist, normal conjunctiva  RESP: CTAB, no respiratory distress, no wheezes/rhonchi/rales, speaking in full sentences  CV: RRR, no murmurs/rubs/gallops  ABDOMEN: soft, RLQ abdominal hernia with +tenderness, no overlying skin changes.   , non-distended, no guarding, no rebound tenderness  MSK: no visible deformities  NEURO: no focal sensory or motor deficits, CN 2-12 grossly intact, 5/5 strength in all extremities  SKIN: warm, normal color, well perfused, no rash  PSYCH: normal affect

## 2024-02-16 NOTE — ED ADULT NURSE NOTE - NSFALLRISKINTERV_ED_ALL_ED
Assistance OOB with selected safe patient handling equipment if applicable/Assistance with ambulation/Communicate fall risk and risk factors to all staff, patient, and family/Monitor gait and stability/Orthostatic vital signs/Provide patient with walking aids/Provide visual cue: yellow wristband, yellow gown, etc/Reinforce activity limits and safety measures with patient and family/Call bell, personal items and telephone in reach/Instruct patient to call for assistance before getting out of bed/chair/stretcher/Non-slip footwear applied when patient is off stretcher/[place] to call system/Physically safe environment - no spills, clutter or unnecessary equipment/Purposeful Proactive Rounding/Room/bathroom lighting operational, light cord in reach

## 2024-02-16 NOTE — H&P ADULT - ASSESSMENT
70 y/o male with PMHx of CHF with unknown LVEF, anxiety, bipolar disorder, PAD, HTN; presents to ED BIBEMS c/o L-sided chest pain and is s/p syncopal episode today after going to the bathroom preceded by multiple episode of diarrhea, abdominal pain, sudden onset dizziness, blurry vision and loss of balance, states diarrhea and weakness x2 days. Denies HA.    On my encounter pt is lethargic, states he had abdominal pain and diarrhea until today in the morning, unable to give more details, VS stable, NGT placed in the  cc of brown murky fluid obtain on insertion, hernia at bedside reduced after ice and morphine but pt easily comes back out.    CT scan concerning for SBO in the ventral hernia    Plan:    Admit to Dr. Gautam, SICU level of care  Pain control  IVF  Home meds  DVT pxx  PT assessment  Diet: NPO  Glass  NGT  ISS  HbA1c  SICU consult    Case discussed with Dr. Gautam    Case discussed with

## 2024-02-16 NOTE — ED PROVIDER NOTE - PROGRESS NOTE DETAILS
Called by radiology, no aortic dissection but does have complex right-sided hernia concerning for SBO with fluid backing up into the distal esophagus.  Spoke to surgery resident, will see the patient. Patient seen by surgical resident with plan to admit to Dr. Looney under surgery service.  Orders for NG tube and x-ray placed surgery request, they are also requesting Glass catheter.  Admission orders to be placed

## 2024-02-16 NOTE — ED PROVIDER NOTE - CLINICAL SUMMARY MEDICAL DECISION MAKING FREE TEXT BOX
69-year-old male presents for syncope in the setting of multiple episodes of diarrhea and abdominal pain earlier today, episode happened while having a bowel movement.  Now having chest pain after the episode.  Patient will require CTA to rule out dissection, also has large right lower quadrant hernia rule out incarceration.  Plan for blood work, chest x-ray, CTs, pain control, reassess.

## 2024-02-16 NOTE — ED ADULT NURSE REASSESSMENT NOTE - NS ED NURSE REASSESS COMMENT FT1
Received patient from previous RN tiffany. Patient complaining of abdominal pain and chest pain at this time.  patient medicated for pain and nausea as ordered. VSS. Plan of care explained. No signs of distress noted at this time. Safety measures in place.

## 2024-02-16 NOTE — H&P ADULT - HISTORY OF PRESENT ILLNESS
68 y/o male with PMHx of CHF with unknown LVEF, anxiety, bipolar disorder, PAD, HTN; presents to ED BIBEMS c/o L-sided chest pain and is s/p syncopal episode today after going to the bathroom preceded by multiple episode of diarrhea, abdominal pain, sudden onset dizziness, blurry vision and loss of balance, states diarrhea and weakness x2 days. Denies HA.    On my encounter pt is lethargic, states he had abdominal pain and diarrhea until today in the morning, unable to give more details, VS stable, NGT placed in the  cc of brown murky fluid obtain on insertion, hernia at bedside reduced after ice and morphine but pt easily comes back out.

## 2024-02-16 NOTE — ED ADULT NURSE NOTE - OBJECTIVE STATEMENT
Patient presented to ED with c/o dizziness. Pt AOx3 and reports passing out while watching tv. Reports having upper abdominal pain radiating to chest, with SOB. Endorses having one episode of diarrhea last night, and feeling dizzy while using bathroom. Pt ambulates at baseline with cane, but reports feeling more unsteady than usual. SpO2 at 94% on RA. Pt placed on cardiac monitor.

## 2024-02-16 NOTE — H&P ADULT - NSHPPHYSICALEXAM_GEN_ALL_CORE
Vitals:  T(C): 36.7 (02-16 @ 16:06), Max: 36.7 (02-16 @ 16:06)  HR: 89 (02-16 @ 19:33) (66 - 89)  BP: 132/79 (02-16 @ 19:33) (132/79 - 138/70)  RR: 19 (02-16 @ 19:33) (17 - 19)  SpO2: 95% (02-16 @ 19:33) (95% - 97%)      Physical Exam:  General: AAOx3, lethargic, NGT in place  Chest: Normal respiratory effort  Heart: RRR  Abdomen: distended, large ventral hernia with bowel, tender to palpation large defect found, able to reduce at bedside, easily comes back out  Neuro/Psych: No localized deficits.   Skin: Normal, no rashes, no lesions noted.   Extremities: Warm, well perfused

## 2024-02-16 NOTE — CONSULT NOTE ADULT - SUBJECTIVE AND OBJECTIVE BOX
Patient is a 69y old  Male who presents with a chief complaint of weakness    HPI:  70 y/o male with PMHx of CHF with unknown LVEF, anxiety, bipolar disorder, PAD, HTN; presents to ED BIBEMS c/o L-sided chest pain and is s/p syncopal episode today after going to the bathroom preceded by multiple episode of diarrhea, abdominal pain, sudden onset dizziness, blurry vision and loss of balance, states diarrhea and weakness x2 days. Denies HA.    Pt seen at bedside. He has NGT in place. He is fatigued but alert and oriented.   I was asked to place a landa catheter after difficult attempt by PA. Pt urinated over 500cc in urinol over an hour ago. He states he never had any retention episode in past.   He had a bowel incontinence right before I entered his room. He continues to reports low back pain  He is refusing the landa catheter.     pvr ~300cc.     PAST MEDICAL & SURGICAL HISTORY:    HTN (hypertension)    PAD (peripheral artery disease)    Bipolar disorder    Anxiety    CHF with unknown LVEF    Poor historian    H/O hernia repair    H/O knee surgery        REVIEW OF SYSTEMS:  as reported above      MEDICATIONS  (STANDING):  dextrose 5%. 1000 milliLiter(s) (100 mL/Hr) IV Continuous <Continuous>  dextrose 5%. 1000 milliLiter(s) (50 mL/Hr) IV Continuous <Continuous>  dextrose 50% Injectable 25 Gram(s) IV Push once  dextrose 50% Injectable 25 Gram(s) IV Push once  dextrose 50% Injectable 12.5 Gram(s) IV Push once  enoxaparin Injectable 40 milliGRAM(s) SubCutaneous every 12 hours  glucagon  Injectable 1 milliGRAM(s) IntraMuscular once  insulin lispro (ADMELOG) corrective regimen sliding scale   SubCutaneous three times a day before meals  metoprolol tartrate Injectable 2.5 milliGRAM(s) IV Push every 6 hours  pantoprazole  Injectable 40 milliGRAM(s) IV Push daily  sodium chloride 0.9%. 1000 milliLiter(s) (150 mL/Hr) IV Continuous <Continuous>    MEDICATIONS  (PRN):  acetaminophen   IVPB .. 1000 milliGRAM(s) IV Intermittent once PRN Temp greater or equal to 38C (100.4F), Mild Pain (1 - 3)  dextrose Oral Gel 15 Gram(s) Oral once PRN Blood Glucose LESS THAN 70 milliGRAM(s)/deciliter  ketorolac   Injectable 15 milliGRAM(s) IV Push every 6 hours PRN Moderate Pain (4 - 6)  morphine  - Injectable 2 milliGRAM(s) IV Push every 4 hours PRN Severe Pain (7 - 10)      Allergies    No Known Allergies    Intolerances        SOCIAL HISTORY: No illicit drug use // smoking  : [ ] yes [ ] no //  : [ ] yes [ ] no // ETOH :  [ ] yes [ ] no    FAMILY HISTORY:  Family history of heart attack (Father)  Father- 52        Vital Signs Last 24 Hrs  T(C): 36.7 (16 Feb 2024 16:06), Max: 36.7 (16 Feb 2024 16:06)  T(F): 98 (16 Feb 2024 16:06), Max: 98 (16 Feb 2024 16:06)  HR: 89 (16 Feb 2024 19:33) (66 - 89)  BP: 132/79 (16 Feb 2024 19:33) (132/79 - 138/70)  BP(mean): 94 (16 Feb 2024 17:30) (94 - 94)  RR: 19 (16 Feb 2024 19:33) (17 - 19)  SpO2: 95% (16 Feb 2024 19:33) (95% - 97%)    Parameters below as of 16 Feb 2024 19:33  Patient On (Oxygen Delivery Method): room air       [ ] yes [ ] no  PHYSICAL EXAM:    Constitutional: well-developed  HEENT: NGT in place draining brownish fluid  Respiratory:  No accessory respiratory muscle use  CV: Chest symmetric, equal expansion  Abd: Soft, umbilical hernia.   : no landa catheter  Neurological: alert oriented           LABS:                        13.7   11.37 )-----------( 367      ( 16 Feb 2024 17:32 )             41.2     02-16    134<L>  |  99  |  21  ----------------------------<  220<H>  4.3   |  30  |  0.84    Ca    9.5      16 Feb 2024 17:32  Mg     2.1     02-16    TPro  7.5  /  Alb  3.6  /  TBili  0.6  /  DBili  x   /  AST  14<L>  /  ALT  21  /  AlkPhos  88  02-16    PT/INR - ( 16 Feb 2024 18:07 )   PT: 10.2 sec;   INR: 0.90 ratio         PTT - ( 16 Feb 2024 18:07 )  PTT:32.0 sec  Urinalysis Basic - ( 16 Feb 2024 17:32 )    Color: x / Appearance: x / SG: x / pH: x  Gluc: 220 mg/dL / Ketone: x  / Bili: x / Urobili: x   Blood: x / Protein: x / Nitrite: x   Leuk Esterase: x / RBC: x / WBC x   Sq Epi: x / Non Sq Epi: x / Bacteria: x      Urine Culture:         RADIOLOGY & ADDITIONAL STUDIES:      Assessment :        Plan :

## 2024-02-17 LAB
A1C WITH ESTIMATED AVERAGE GLUCOSE RESULT: 8.1 % — HIGH (ref 4–5.6)
ANION GAP SERPL CALC-SCNC: 6 MMOL/L — SIGNIFICANT CHANGE UP (ref 5–17)
BACTERIA # UR AUTO: NEGATIVE /HPF — SIGNIFICANT CHANGE UP
BUN SERPL-MCNC: 16 MG/DL — SIGNIFICANT CHANGE UP (ref 7–23)
CALCIUM SERPL-MCNC: 8.5 MG/DL — SIGNIFICANT CHANGE UP (ref 8.5–10.1)
CAST: 0 /LPF — SIGNIFICANT CHANGE UP (ref 0–4)
CHLORIDE SERPL-SCNC: 104 MMOL/L — SIGNIFICANT CHANGE UP (ref 96–108)
CHOLEST SERPL-MCNC: 140 MG/DL — SIGNIFICANT CHANGE UP
CO2 SERPL-SCNC: 27 MMOL/L — SIGNIFICANT CHANGE UP (ref 22–31)
CREAT SERPL-MCNC: 0.66 MG/DL — SIGNIFICANT CHANGE UP (ref 0.5–1.3)
EGFR: 102 ML/MIN/1.73M2 — SIGNIFICANT CHANGE UP
ESTIMATED AVERAGE GLUCOSE: 186 MG/DL — HIGH (ref 68–114)
GLUCOSE BLDC GLUCOMTR-MCNC: 152 MG/DL — HIGH (ref 70–99)
GLUCOSE BLDC GLUCOMTR-MCNC: 166 MG/DL — HIGH (ref 70–99)
GLUCOSE BLDC GLUCOMTR-MCNC: 181 MG/DL — HIGH (ref 70–99)
GLUCOSE SERPL-MCNC: 182 MG/DL — HIGH (ref 70–99)
HCT VFR BLD CALC: 43.6 % — SIGNIFICANT CHANGE UP (ref 39–50)
HCV AB S/CO SERPL IA: 0.15 S/CO — SIGNIFICANT CHANGE UP (ref 0–0.99)
HCV AB SERPL-IMP: SIGNIFICANT CHANGE UP
HDLC SERPL-MCNC: 36 MG/DL — LOW
HGB BLD-MCNC: 14.7 G/DL — SIGNIFICANT CHANGE UP (ref 13–17)
LIPID PNL WITH DIRECT LDL SERPL: 80 MG/DL — SIGNIFICANT CHANGE UP
MAGNESIUM SERPL-MCNC: 1.9 MG/DL — SIGNIFICANT CHANGE UP (ref 1.6–2.6)
MCHC RBC-ENTMCNC: 28.6 PG — SIGNIFICANT CHANGE UP (ref 27–34)
MCHC RBC-ENTMCNC: 33.7 GM/DL — SIGNIFICANT CHANGE UP (ref 32–36)
MCV RBC AUTO: 84.8 FL — SIGNIFICANT CHANGE UP (ref 80–100)
NON HDL CHOLESTEROL: 104 MG/DL — SIGNIFICANT CHANGE UP
PHOSPHATE SERPL-MCNC: 3.3 MG/DL — SIGNIFICANT CHANGE UP (ref 2.5–4.5)
PLATELET # BLD AUTO: 363 K/UL — SIGNIFICANT CHANGE UP (ref 150–400)
POTASSIUM SERPL-MCNC: 4.2 MMOL/L — SIGNIFICANT CHANGE UP (ref 3.5–5.3)
POTASSIUM SERPL-SCNC: 4.2 MMOL/L — SIGNIFICANT CHANGE UP (ref 3.5–5.3)
RBC # BLD: 5.14 M/UL — SIGNIFICANT CHANGE UP (ref 4.2–5.8)
RBC # FLD: 12.9 % — SIGNIFICANT CHANGE UP (ref 10.3–14.5)
RBC CASTS # UR COMP ASSIST: 457 /HPF — HIGH (ref 0–4)
SODIUM SERPL-SCNC: 137 MMOL/L — SIGNIFICANT CHANGE UP (ref 135–145)
SQUAMOUS # UR AUTO: 0 /HPF — SIGNIFICANT CHANGE UP (ref 0–5)
TRIGL SERPL-MCNC: 135 MG/DL — SIGNIFICANT CHANGE UP
TSH SERPL-MCNC: 1.36 UU/ML — SIGNIFICANT CHANGE UP (ref 0.34–4.82)
WBC # BLD: 8.99 K/UL — SIGNIFICANT CHANGE UP (ref 3.8–10.5)
WBC # FLD AUTO: 8.99 K/UL — SIGNIFICANT CHANGE UP (ref 3.8–10.5)
WBC UR QL: 2 /HPF — SIGNIFICANT CHANGE UP (ref 0–5)

## 2024-02-17 PROCEDURE — 93306 TTE W/DOPPLER COMPLETE: CPT | Mod: 26

## 2024-02-17 PROCEDURE — 99232 SBSQ HOSP IP/OBS MODERATE 35: CPT

## 2024-02-17 RX ORDER — INFLUENZA VIRUS VACCINE 15; 15; 15; 15 UG/.5ML; UG/.5ML; UG/.5ML; UG/.5ML
0.7 SUSPENSION INTRAMUSCULAR ONCE
Refills: 0 | Status: DISCONTINUED | OUTPATIENT
Start: 2024-02-17 | End: 2024-02-19

## 2024-02-17 RX ADMIN — Medication 1: at 09:02

## 2024-02-17 RX ADMIN — SODIUM CHLORIDE 150 MILLILITER(S): 9 INJECTION INTRAMUSCULAR; INTRAVENOUS; SUBCUTANEOUS at 12:30

## 2024-02-17 RX ADMIN — Medication 1: at 17:14

## 2024-02-17 RX ADMIN — Medication 2.5 MILLIGRAM(S): at 00:05

## 2024-02-17 RX ADMIN — Medication 2.5 MILLIGRAM(S): at 05:10

## 2024-02-17 RX ADMIN — ENOXAPARIN SODIUM 40 MILLIGRAM(S): 100 INJECTION SUBCUTANEOUS at 09:01

## 2024-02-17 RX ADMIN — ENOXAPARIN SODIUM 40 MILLIGRAM(S): 100 INJECTION SUBCUTANEOUS at 21:26

## 2024-02-17 RX ADMIN — PANTOPRAZOLE SODIUM 40 MILLIGRAM(S): 20 TABLET, DELAYED RELEASE ORAL at 09:01

## 2024-02-17 RX ADMIN — SODIUM CHLORIDE 150 MILLILITER(S): 9 INJECTION INTRAMUSCULAR; INTRAVENOUS; SUBCUTANEOUS at 17:15

## 2024-02-17 RX ADMIN — Medication 1: at 12:29

## 2024-02-17 RX ADMIN — PANTOPRAZOLE SODIUM 40 MILLIGRAM(S): 20 TABLET, DELAYED RELEASE ORAL at 00:05

## 2024-02-17 RX ADMIN — SODIUM CHLORIDE 150 MILLILITER(S): 9 INJECTION INTRAMUSCULAR; INTRAVENOUS; SUBCUTANEOUS at 21:26

## 2024-02-17 RX ADMIN — Medication 2.5 MILLIGRAM(S): at 12:30

## 2024-02-17 NOTE — PATIENT PROFILE ADULT - FALL HARM RISK - HARM RISK INTERVENTIONS

## 2024-02-17 NOTE — ED ADULT NURSE REASSESSMENT NOTE - NS ED NURSE REASSESS COMMENT FT1
Patient A&Ox3, VSS on 2L NC. patient placed on 2l for comfort. No complaints of pain at this time. All medications administered as ordered. NG tube to R nare to LCWS 800cc of dark brown output noted.  IV fluids infusing as ordered. Blood sugar monitored as ordered. No complaints of pain or signs of distress noted at this time. Patient urinated 500cc via urinal, no landa needed at this time as per surgery and Dr. marie. Plan of care explained. Safety measures in place. Patient A&Ox3, VSS on 2L NC. patient placed on 2l for comfort. No complaints of pain at this time. All medications administered as ordered. NG tube to R nare to LCWS 800cc of dark brown output noted.  IV fluids infusing as ordered. Blood sugar monitored as ordered. No complaints of pain or signs of distress noted at this time. Patient urinated 500cc via urinal, no landa needed at this time as per surgery and Dr. marie. Patient had episode of diarrhea, patient cleaned and repositioned. Plan of care explained. Safety measures in place.

## 2024-02-18 LAB
BUN SERPL-MCNC: 13 MG/DL — SIGNIFICANT CHANGE UP (ref 7–23)
CALCIUM SERPL-MCNC: 8.3 MG/DL — LOW (ref 8.5–10.1)
CHLORIDE SERPL-SCNC: 110 MMOL/L — HIGH (ref 96–108)
CO2 SERPL-SCNC: 28 MMOL/L — SIGNIFICANT CHANGE UP (ref 22–31)
CREAT SERPL-MCNC: 0.69 MG/DL — SIGNIFICANT CHANGE UP (ref 0.5–1.3)
EGFR: 100 ML/MIN/1.73M2 — SIGNIFICANT CHANGE UP
GLUCOSE BLDC GLUCOMTR-MCNC: 142 MG/DL — HIGH (ref 70–99)
GLUCOSE BLDC GLUCOMTR-MCNC: 152 MG/DL — HIGH (ref 70–99)
GLUCOSE SERPL-MCNC: 146 MG/DL — HIGH (ref 70–99)
HCT VFR BLD CALC: 42.4 % — SIGNIFICANT CHANGE UP (ref 39–50)
HGB BLD-MCNC: 13.4 G/DL — SIGNIFICANT CHANGE UP (ref 13–17)
MAGNESIUM SERPL-MCNC: 1.9 MG/DL — SIGNIFICANT CHANGE UP (ref 1.6–2.6)
MCHC RBC-ENTMCNC: 27.9 PG — SIGNIFICANT CHANGE UP (ref 27–34)
MCHC RBC-ENTMCNC: 31.6 GM/DL — LOW (ref 32–36)
MCV RBC AUTO: 88.1 FL — SIGNIFICANT CHANGE UP (ref 80–100)
PHOSPHATE SERPL-MCNC: 2 MG/DL — LOW (ref 2.5–4.5)
PLATELET # BLD AUTO: 336 K/UL — SIGNIFICANT CHANGE UP (ref 150–400)
POTASSIUM SERPL-MCNC: 4 MMOL/L — SIGNIFICANT CHANGE UP (ref 3.5–5.3)
POTASSIUM SERPL-SCNC: 4 MMOL/L — SIGNIFICANT CHANGE UP (ref 3.5–5.3)
RBC # BLD: 4.81 M/UL — SIGNIFICANT CHANGE UP (ref 4.2–5.8)
RBC # FLD: 13.3 % — SIGNIFICANT CHANGE UP (ref 10.3–14.5)
SODIUM SERPL-SCNC: 138 MMOL/L — SIGNIFICANT CHANGE UP (ref 135–145)
WBC # BLD: 9.11 K/UL — SIGNIFICANT CHANGE UP (ref 3.8–10.5)
WBC # FLD AUTO: 9.11 K/UL — SIGNIFICANT CHANGE UP (ref 3.8–10.5)

## 2024-02-18 PROCEDURE — 99232 SBSQ HOSP IP/OBS MODERATE 35: CPT

## 2024-02-18 RX ORDER — ASPIRIN/CALCIUM CARB/MAGNESIUM 324 MG
81 TABLET ORAL DAILY
Refills: 0 | Status: DISCONTINUED | OUTPATIENT
Start: 2024-02-18 | End: 2024-02-19

## 2024-02-18 RX ORDER — DEXTROAMPHETAMINE SACCHARATE, AMPHETAMINE ASPARTATE, DEXTROAMPHETAMINE SULFATE AND AMPHETAMINE SULFATE 1.875; 1.875; 1.875; 1.875 MG/1; MG/1; MG/1; MG/1
20 TABLET ORAL THREE TIMES A DAY
Refills: 0 | Status: DISCONTINUED | OUTPATIENT
Start: 2024-02-18 | End: 2024-02-19

## 2024-02-18 RX ORDER — ATORVASTATIN CALCIUM 80 MG/1
40 TABLET, FILM COATED ORAL AT BEDTIME
Refills: 0 | Status: DISCONTINUED | OUTPATIENT
Start: 2024-02-18 | End: 2024-02-19

## 2024-02-18 RX ORDER — LOSARTAN POTASSIUM 100 MG/1
50 TABLET, FILM COATED ORAL DAILY
Refills: 0 | Status: DISCONTINUED | OUTPATIENT
Start: 2024-02-18 | End: 2024-02-19

## 2024-02-18 RX ORDER — BUPRENORPHINE AND NALOXONE 2; .5 MG/1; MG/1
1 TABLET SUBLINGUAL
Refills: 0 | Status: DISCONTINUED | OUTPATIENT
Start: 2024-02-18 | End: 2024-02-19

## 2024-02-18 RX ADMIN — DEXTROAMPHETAMINE SACCHARATE, AMPHETAMINE ASPARTATE, DEXTROAMPHETAMINE SULFATE AND AMPHETAMINE SULFATE 20 MILLIGRAM(S): 1.875; 1.875; 1.875; 1.875 TABLET ORAL at 17:21

## 2024-02-18 RX ADMIN — Medication 2.5 MILLIGRAM(S): at 06:36

## 2024-02-18 RX ADMIN — Medication 1: at 17:21

## 2024-02-18 RX ADMIN — Medication 81 MILLIGRAM(S): at 17:21

## 2024-02-18 RX ADMIN — SODIUM CHLORIDE 150 MILLILITER(S): 9 INJECTION INTRAMUSCULAR; INTRAVENOUS; SUBCUTANEOUS at 02:10

## 2024-02-18 RX ADMIN — ENOXAPARIN SODIUM 40 MILLIGRAM(S): 100 INJECTION SUBCUTANEOUS at 10:19

## 2024-02-18 RX ADMIN — PANTOPRAZOLE SODIUM 40 MILLIGRAM(S): 20 TABLET, DELAYED RELEASE ORAL at 10:19

## 2024-02-18 RX ADMIN — BUPRENORPHINE AND NALOXONE 1 FILM(S): 2; .5 TABLET SUBLINGUAL at 21:47

## 2024-02-18 RX ADMIN — Medication 50 MILLIGRAM(S): at 21:48

## 2024-02-18 RX ADMIN — ATORVASTATIN CALCIUM 40 MILLIGRAM(S): 80 TABLET, FILM COATED ORAL at 21:48

## 2024-02-18 RX ADMIN — Medication 2.5 MILLIGRAM(S): at 02:10

## 2024-02-18 RX ADMIN — ENOXAPARIN SODIUM 40 MILLIGRAM(S): 100 INJECTION SUBCUTANEOUS at 21:48

## 2024-02-19 ENCOUNTER — TRANSCRIPTION ENCOUNTER (OUTPATIENT)
Age: 69
End: 2024-02-19

## 2024-02-19 VITALS
RESPIRATION RATE: 20 BRPM | HEART RATE: 98 BPM | OXYGEN SATURATION: 97 % | SYSTOLIC BLOOD PRESSURE: 136 MMHG | DIASTOLIC BLOOD PRESSURE: 77 MMHG

## 2024-02-19 LAB
ALBUMIN SERPL ELPH-MCNC: 3 G/DL — LOW (ref 3.3–5)
ALP SERPL-CCNC: 76 U/L — SIGNIFICANT CHANGE UP (ref 40–120)
ALT FLD-CCNC: 14 U/L — SIGNIFICANT CHANGE UP (ref 12–78)
ANION GAP SERPL CALC-SCNC: 5 MMOL/L — SIGNIFICANT CHANGE UP (ref 5–17)
AST SERPL-CCNC: 11 U/L — LOW (ref 15–37)
BASOPHILS # BLD AUTO: 0.06 K/UL — SIGNIFICANT CHANGE UP (ref 0–0.2)
BASOPHILS NFR BLD AUTO: 0.6 % — SIGNIFICANT CHANGE UP (ref 0–2)
BILIRUB SERPL-MCNC: 0.9 MG/DL — SIGNIFICANT CHANGE UP (ref 0.2–1.2)
BUN SERPL-MCNC: 16 MG/DL — SIGNIFICANT CHANGE UP (ref 7–23)
CALCIUM SERPL-MCNC: 8.7 MG/DL — SIGNIFICANT CHANGE UP (ref 8.5–10.1)
CHLORIDE SERPL-SCNC: 108 MMOL/L — SIGNIFICANT CHANGE UP (ref 96–108)
CO2 SERPL-SCNC: 24 MMOL/L — SIGNIFICANT CHANGE UP (ref 22–31)
CREAT SERPL-MCNC: 0.62 MG/DL — SIGNIFICANT CHANGE UP (ref 0.5–1.3)
EGFR: 103 ML/MIN/1.73M2 — SIGNIFICANT CHANGE UP
EOSINOPHIL # BLD AUTO: 0.13 K/UL — SIGNIFICANT CHANGE UP (ref 0–0.5)
EOSINOPHIL NFR BLD AUTO: 1.3 % — SIGNIFICANT CHANGE UP (ref 0–6)
GLUCOSE BLDC GLUCOMTR-MCNC: 144 MG/DL — HIGH (ref 70–99)
GLUCOSE SERPL-MCNC: 134 MG/DL — HIGH (ref 70–99)
HCT VFR BLD CALC: 40.7 % — SIGNIFICANT CHANGE UP (ref 39–50)
HGB BLD-MCNC: 13.4 G/DL — SIGNIFICANT CHANGE UP (ref 13–17)
IMM GRANULOCYTES NFR BLD AUTO: 0.6 % — SIGNIFICANT CHANGE UP (ref 0–0.9)
LYMPHOCYTES # BLD AUTO: 3.74 K/UL — HIGH (ref 1–3.3)
LYMPHOCYTES # BLD AUTO: 37.3 % — SIGNIFICANT CHANGE UP (ref 13–44)
MAGNESIUM SERPL-MCNC: 2.2 MG/DL — SIGNIFICANT CHANGE UP (ref 1.6–2.6)
MCHC RBC-ENTMCNC: 28.5 PG — SIGNIFICANT CHANGE UP (ref 27–34)
MCHC RBC-ENTMCNC: 32.9 GM/DL — SIGNIFICANT CHANGE UP (ref 32–36)
MCV RBC AUTO: 86.6 FL — SIGNIFICANT CHANGE UP (ref 80–100)
MONOCYTES # BLD AUTO: 0.79 K/UL — SIGNIFICANT CHANGE UP (ref 0–0.9)
MONOCYTES NFR BLD AUTO: 7.9 % — SIGNIFICANT CHANGE UP (ref 2–14)
NEUTROPHILS # BLD AUTO: 5.26 K/UL — SIGNIFICANT CHANGE UP (ref 1.8–7.4)
NEUTROPHILS NFR BLD AUTO: 52.3 % — SIGNIFICANT CHANGE UP (ref 43–77)
PHOSPHATE SERPL-MCNC: 2.3 MG/DL — LOW (ref 2.5–4.5)
PLATELET # BLD AUTO: 331 K/UL — SIGNIFICANT CHANGE UP (ref 150–400)
POTASSIUM SERPL-MCNC: 3.5 MMOL/L — SIGNIFICANT CHANGE UP (ref 3.5–5.3)
POTASSIUM SERPL-SCNC: 3.5 MMOL/L — SIGNIFICANT CHANGE UP (ref 3.5–5.3)
PROT SERPL-MCNC: 6.7 GM/DL — SIGNIFICANT CHANGE UP (ref 6–8.3)
RBC # BLD: 4.7 M/UL — SIGNIFICANT CHANGE UP (ref 4.2–5.8)
RBC # FLD: 13.2 % — SIGNIFICANT CHANGE UP (ref 10.3–14.5)
SODIUM SERPL-SCNC: 137 MMOL/L — SIGNIFICANT CHANGE UP (ref 135–145)
WBC # BLD: 10.04 K/UL — SIGNIFICANT CHANGE UP (ref 3.8–10.5)
WBC # FLD AUTO: 10.04 K/UL — SIGNIFICANT CHANGE UP (ref 3.8–10.5)

## 2024-02-19 RX ORDER — RANITIDINE HYDROCHLORIDE 150 MG/1
1 TABLET, FILM COATED ORAL
Qty: 0 | Refills: 0 | DISCHARGE

## 2024-02-19 RX ORDER — TRAMADOL HYDROCHLORIDE 50 MG/1
0 TABLET ORAL
Qty: 0 | Refills: 0 | DISCHARGE

## 2024-02-19 RX ORDER — SODIUM,POTASSIUM PHOSPHATES 278-250MG
2 POWDER IN PACKET (EA) ORAL ONCE
Refills: 0 | Status: COMPLETED | OUTPATIENT
Start: 2024-02-19 | End: 2024-02-19

## 2024-02-19 RX ADMIN — DEXTROAMPHETAMINE SACCHARATE, AMPHETAMINE ASPARTATE, DEXTROAMPHETAMINE SULFATE AND AMPHETAMINE SULFATE 20 MILLIGRAM(S): 1.875; 1.875; 1.875; 1.875 TABLET ORAL at 06:37

## 2024-02-19 RX ADMIN — Medication 2 TABLET(S): at 11:00

## 2024-02-19 RX ADMIN — BUPRENORPHINE AND NALOXONE 1 FILM(S): 2; .5 TABLET SUBLINGUAL at 11:01

## 2024-02-19 RX ADMIN — LOSARTAN POTASSIUM 50 MILLIGRAM(S): 100 TABLET, FILM COATED ORAL at 10:59

## 2024-02-19 RX ADMIN — Medication 81 MILLIGRAM(S): at 11:00

## 2024-02-19 RX ADMIN — ENOXAPARIN SODIUM 40 MILLIGRAM(S): 100 INJECTION SUBCUTANEOUS at 10:59

## 2024-02-19 RX ADMIN — DEXTROAMPHETAMINE SACCHARATE, AMPHETAMINE ASPARTATE, DEXTROAMPHETAMINE SULFATE AND AMPHETAMINE SULFATE 20 MILLIGRAM(S): 1.875; 1.875; 1.875; 1.875 TABLET ORAL at 11:00

## 2024-02-19 RX ADMIN — Medication 50 MILLIGRAM(S): at 11:00

## 2024-02-19 RX ADMIN — PANTOPRAZOLE SODIUM 40 MILLIGRAM(S): 20 TABLET, DELAYED RELEASE ORAL at 10:59

## 2024-02-19 NOTE — DISCHARGE NOTE NURSING/CASE MANAGEMENT/SOCIAL WORK - NSDCPEWEB_GEN_ALL_CORE
[facility] for Tobacco [facility] website --- [email or web address] website --- [email or web address]

## 2024-02-19 NOTE — PROGRESS NOTE ADULT - ASSESSMENT
70 y/o male with PMHx of CHF with unknown LVEF, anxiety, bipolar disorder, PAD, HTN; presents to ED BIBEMS c/o L-sided chest pain and is s/p syncopal episode today after going to the bathroom preceded by multiple episode of diarrhea, abdominal pain, sudden onset dizziness, blurry vision and loss of balance, states diarrhea and weakness x2 days. Denies HA.    On ED was lethargic, with abdominal pain, large ventral hernia found, tender to palpation, VS stable, NGT placed in the  cc of brown murky fluid obtain on insertion, hernia at bedside reduced after ice and morphine but pt easily comes back out.    CT scan concerning for SBO in the ventral hernia    Plan:    Pain control  IVF  Home meds  DVT pxx  PT assessment  Diet: NPO  NGT  ISS  HbA1c  SICU consult  serial abdominal exams  abdominal binder    Case discussed with Dr. Gautam
69 M morbid obese w/ SBO in setting of ventral hernia  Hernia reduced bedside with significant clinical improvement.   Flatus/BM (+)    Plan:   ADAT  Restart home meds  Downgrade  Dispo planning    Plan discussed with Dr. Gauatm
69M BMI morbid obesity, admitted with syncope likely from dehydration  and sbo from incarcerated ventral hernia, CT with multiple transition points  hernia was reduced , symptoms improved clinically but still with hernia  had hematuria, but now able to void    Plan  1. advance diet  2. d/c IV fluid  3. sliding scale for diabetes mellitus  4. echo preserved ef  5. resume home antihypertensives  6. Resume home meds  
69M BMI morbid obesity, admitted with syncope likely from dehydration  and sbo from incarcerated ventral hernia, CT with multiple transition points  hernia was reduced , symptoms improved clinically but still with hernia  had hematuria, but now able to void    Plan  1. start diet  2. hydration  3. sliding scale for diabetes mellitus  4. echo preserved ef  5.  monitor abdominal exam

## 2024-02-19 NOTE — PHYSICAL THERAPY INITIAL EVALUATION ADULT - LEVEL OF INDEPENDENCE: STAIR NEGOTIATION, REHAB EVAL
TBA - Not safe to attempt at this time. Will attempt another time. Patient has [address] his apartment

## 2024-02-19 NOTE — DISCHARGE NOTE NURSING/CASE MANAGEMENT/SOCIAL WORK - NSDCPEEMAIL_GEN_ALL_CORE
[facility] for Tobacco Control email [email or web address]

## 2024-02-19 NOTE — PHYSICAL THERAPY INITIAL EVALUATION ADULT - GENERAL OBSERVATIONS, REHAB EVAL
Pt rec'd sitting OOB in chair with SICU HM, BP Cuff, Pulse Oxym, Glass all intact. Abdominal Binder Intact. Pt denied any pain or discomfort

## 2024-02-19 NOTE — DISCHARGE NOTE PROVIDER - NSDCCPCAREPLAN_GEN_ALL_CORE_FT
PRINCIPAL DISCHARGE DIAGNOSIS  Diagnosis: SBO (small bowel obstruction)  Assessment and Plan of Treatment:       SECONDARY DISCHARGE DIAGNOSES  Diagnosis: Syncope  Assessment and Plan of Treatment:

## 2024-02-19 NOTE — DISCHARGE NOTE PROVIDER - HOSPITAL COURSE
Detail Level: Zone Quality 265: Biopsy Follow-Up: Biopsy results reviewed, communicated, tracked, and documented 69 M morbid obese w/ SBO in setting of ventral hernia, Hernia reduced bedside with significant clinical improvement. Flatus/BM (+)

## 2024-02-19 NOTE — PHYSICAL THERAPY INITIAL EVALUATION ADULT - ADDITIONAL COMMENTS
Patient was ambulating Independently with Cane PTA. Patient was performing all ADL's independently at home PTA including walking to the grocery store and carrying groceries home.

## 2024-02-19 NOTE — DISCHARGE NOTE NURSING/CASE MANAGEMENT/SOCIAL WORK - PATIENT PORTAL LINK FT
You can access the FollowMyHealth Patient Portal offered by [facility] by registering at the following website: [email or web address]. By joining [employer]’s FollowMyHealth portal, you will also be able to view your health information using other applications (apps) compatible with our system.

## 2024-02-19 NOTE — PHYSICAL THERAPY INITIAL EVALUATION ADULT - NSPTDMEREC_GEN_A_CORE
Patient will require a Rolling Walker due to their diagnosis of Large Ventral Hernia for balance/gait stability and to assist with MRADL's (Mobility Related Activities of Daily Living).

## 2024-02-19 NOTE — PROGRESS NOTE ADULT - SUBJECTIVE AND OBJECTIVE BOX
[facility]   SURGERY DAILY PROGRESS NOTE    Subjective:  Patient seen and examined on morning rounds.   No acute overnight events.      MEDICATIONS  (STANDING):  aspirin  chewable 81 milliGRAM(s) Oral daily  atorvastatin 40 milliGRAM(s) Oral at bedtime  buprenorphine 8 mG/naloxone 2 mG SL Film 1 Film(s) SubLingual two times a day  dextrose 5%. 1000 milliLiter(s) (50 mL/Hr) IV Continuous <Continuous>  dextrose 5%. 1000 milliLiter(s) (100 mL/Hr) IV Continuous <Continuous>  dextrose 50% Injectable 25 Gram(s) IV Push once  dextrose 50% Injectable 25 Gram(s) IV Push once  dextrose 50% Injectable 12.5 Gram(s) IV Push once  enoxaparin Injectable 40 milliGRAM(s) SubCutaneous every 12 hours  glucagon  Injectable 1 milliGRAM(s) IntraMuscular once  influenza  Vaccine (HIGH DOSE) 0.7 milliLiter(s) IntraMuscular once  insulin lispro (ADMELOG) corrective regimen sliding scale   SubCutaneous three times a day before meals  losartan 50 milliGRAM(s) Oral daily  pantoprazole  Injectable 40 milliGRAM(s) IV Push daily  pregabalin 50 milliGRAM(s) Oral two times a day    MEDICATIONS  (PRN):  acetaminophen   IVPB .. 1000 milliGRAM(s) IV Intermittent once PRN Temp greater or equal to 38C (100.4F), Mild Pain (1 - 3)  amphetamine/dextroamphetamine 20 milliGRAM(s) Oral three times a day PRN anxiety  dextrose Oral Gel 15 Gram(s) Oral once PRN Blood Glucose LESS THAN 70 milliGRAM(s)/deciliter  ketorolac   Injectable 15 milliGRAM(s) IV Push every 6 hours PRN Moderate Pain (4 - 6)  morphine  - Injectable 2 milliGRAM(s) IV Push every 4 hours PRN Severe Pain (7 - 10)    Vital Signs Last 24 Hrs  T(C): 36.3 (19 Feb 2024 06:00), Max: 36.8 (18 Feb 2024 09:03)  T(F): 97.4 (19 Feb 2024 06:00), Max: 98.2 (18 Feb 2024 09:03)  HR: 75 (19 Feb 2024 06:00) (69 - 100)  BP: 136/75 (19 Feb 2024 06:00) (118/73 - 149/77)  BP(mean): 93 (19 Feb 2024 06:00) (83 - 104)  RR: 13 (19 Feb 2024 06:00) (12 - 21)  SpO2: 95% (19 Feb 2024 06:00) (93% - 97%)    Parameters below as of 19 Feb 2024 06:00  Patient On (Oxygen Delivery Method): room air      I&O's Detail    18 Feb 2024 07:01  -  19 Feb 2024 07:00  --------------------------------------------------------  IN:    Oral Fluid: 580 mL  Total IN: 580 mL    OUT:    Voided (mL): 2300 mL  Total OUT: 2300 mL    Total NET: -1720 mL        Daily     Daily     LABS:                        13.4   10.04 )-----------( 331      ( 19 Feb 2024 05:45 )             40.7     02-19    137  |  108  |  16  ----------------------------<  134<H>  3.5   |  24  |  0.62    Ca    8.7      19 Feb 2024 05:45  Phos  2.3     02-19  Mg     2.2     02-19    TPro  6.7  /  Alb  3.0<L>  /  TBili  0.9  /  DBili  x   /  AST  11<L>  /  ALT  14  /  AlkPhos  76  02-19      Urinalysis Basic - ( 19 Feb 2024 05:45 )    Color: x / Appearance: x / SG: x / pH: x  Gluc: 134 mg/dL / Ketone: x  / Bili: x / Urobili: x   Blood: x / Protein: x / Nitrite: x   Leuk Esterase: x / RBC: x / WBC x   Sq Epi: x / Non Sq Epi: x / Bacteria: x        Physical Exam:   General: AAOx3,  NAD  Chest: Normal respiratory effort  Heart: RRR  Abdomen: large pannus, distended, ventral hernia reducible, abdominal binder in place  Neuro/Psych: No localized deficits. Normal spech, normal tone  Skin: Normal, no rashes, no lesions noted.     
Date of service  is equal to the date of entry    Patient is a 69y old  Male who presents with a chief complaint of ventral hernia (17 Feb 2024 02:35)    PAST MEDICAL & SURGICAL HISTORY:  Poor historian      HTN (hypertension)      PAD (peripheral artery disease)      Bipolar disorder      Anxiety      CHF with unknown LVEF      Poor historian      H/O hernia repair      H/O knee surgery        RIDGE SCHULTE   69y    Male    BRIEF HOSPITAL COURSE:    Review of Systems:                       All other ROS are negative.    Allergies    No Known Allergies    Intolerances      Weight (kg): 136.1 (02-16-24 @ 16:06)  BMI (kg/m2): 40.7 (02-16-24 @ 16:06)    ICU Vital Signs Last 24 Hrs  T(C): 36.3 (17 Feb 2024 01:10), Max: 36.7 (16 Feb 2024 16:06)  T(F): 97.3 (17 Feb 2024 01:10), Max: 98 (16 Feb 2024 16:06)  HR: 98 (17 Feb 2024 04:00) (66 - 98)  BP: 148/75 (17 Feb 2024 04:00) (126/83 - 148/75)  BP(mean): 95 (17 Feb 2024 04:00) (94 - 96)  ABP: --  ABP(mean): --  RR: 20 (17 Feb 2024 04:00) (14 - 20)  SpO2: 91% (17 Feb 2024 04:00) (91% - 100%)    O2 Parameters below as of 17 Feb 2024 04:00  Patient On (Oxygen Delivery Method): nasal cannula  O2 Flow (L/min): 2        Physical Examination:    General: NAD    HEENT:  No JVD     PULM: bilateral BS     CVS: s1 s2 reg    ABD:  binder NT     EXT: no edema     SKIN: warm     Neuro: Alert oriented NF           LABS:                        13.7   11.37 )-----------( 367      ( 16 Feb 2024 17:32 )             41.2     02-16    134<L>  |  99  |  21  ----------------------------<  220<H>  4.3   |  30  |  0.84    Ca    9.5      16 Feb 2024 17:32  Mg     2.1     02-16    TPro  7.5  /  Alb  3.6  /  TBili  0.6  /  DBili  x   /  AST  14<L>  /  ALT  21  /  AlkPhos  88  02-16          CAPILLARY BLOOD GLUCOSE      POCT Blood Glucose.: 218 mg/dL (16 Feb 2024 23:26)  POCT Blood Glucose.: 237 mg/dL (16 Feb 2024 16:07)    PT/INR - ( 16 Feb 2024 18:07 )   PT: 10.2 sec;   INR: 0.90 ratio         PTT - ( 16 Feb 2024 18:07 )  PTT:32.0 sec  Urinalysis Basic - ( 16 Feb 2024 23:29 )    Color: Yellow / Appearance: Clear / SG: >1.030 / pH: x  Gluc: x / Ketone: Negative mg/dL  / Bili: Negative / Urobili: 1.0 mg/dL   Blood: x / Protein: Trace mg/dL / Nitrite: Negative   Leuk Esterase: Negative / RBC: 457 /HPF / WBC 2 /HPF   Sq Epi: x / Non Sq Epi: 0 /HPF / Bacteria: Negative /HPF      CULTURES:      Medications:  MEDICATIONS  (STANDING):  dextrose 5%. 1000 milliLiter(s) (100 mL/Hr) IV Continuous <Continuous>  dextrose 5%. 1000 milliLiter(s) (50 mL/Hr) IV Continuous <Continuous>  dextrose 50% Injectable 25 Gram(s) IV Push once  dextrose 50% Injectable 25 Gram(s) IV Push once  dextrose 50% Injectable 12.5 Gram(s) IV Push once  enoxaparin Injectable 40 milliGRAM(s) SubCutaneous every 12 hours  glucagon  Injectable 1 milliGRAM(s) IntraMuscular once  insulin lispro (ADMELOG) corrective regimen sliding scale   SubCutaneous three times a day before meals  metoprolol tartrate Injectable 2.5 milliGRAM(s) IV Push every 6 hours  pantoprazole  Injectable 40 milliGRAM(s) IV Push daily  sodium chloride 0.9%. 1000 milliLiter(s) (150 mL/Hr) IV Continuous <Continuous>    MEDICATIONS  (PRN):  acetaminophen   IVPB .. 1000 milliGRAM(s) IV Intermittent once PRN Temp greater or equal to 38C (100.4F), Mild Pain (1 - 3)  dextrose Oral Gel 15 Gram(s) Oral once PRN Blood Glucose LESS THAN 70 milliGRAM(s)/deciliter  ketorolac   Injectable 15 milliGRAM(s) IV Push every 6 hours PRN Moderate Pain (4 - 6)  morphine  - Injectable 2 milliGRAM(s) IV Push every 4 hours PRN Severe Pain (7 - 10)          RADIOLOGY/IMAGING/ECHO    < from: CT Angio Chest Aorta w/wo IV Cont (02.16.24 @ 17:52) >  IMPRESSION:    No acute aortic pathology within the limitations of this non-ECG gated   study. Multivessel coronary artery calcifications and small aortic   root/valve calcification. Low-attenuation at the left ventricle apex may   be due to prior myocardialinfarction. Correlate clinically. Main   pulmonary artery size is enlarged measuring 3.5 cm, which may reflect   pulmonary arterial hypertension.    Large complex ventral hernia associated with fluid-filled mildly   distended mid/distal small bowel loops and small bowel obstruction.   Correlate clinically.  -Multiple transitions are associated with the hernia further described   above.  -Terminal ileum exits the hernia and is completely collapsed.  -Inflammatory changes including mesenteric haziness and trace fluid are   noted within the hernia sac to the right of midline.    Esophageal and gastric distention. Recommend aspiration precautions.   Groundglass opacities of the lower thorax are new since prior imaging, of   unclear etiology. Atypical infection is one possibility. Correlate for   any recent aspiration. Follow to resolution with repeat chest CT in one   month, or sooner as clinically warranted.    < end of copied text >      Assessment/Plan:    69M BMI > 40  hx NICM  HTN HLD pre-existing  ventral hernia       Admit 2/16 with a syncope episode loose stools and L sided CP and abdominal pain.    Here with a SBO with a large ventral hernia that was reduced, recurred, reduced again and now bound with a abdominal binder.      Hematuria after Glass attempts repeat in a few days     NGT suction  IVF >clinically dry with Bun/creat ratio > 20   Sx following   PPI  DVT prophylaxis  Enhanced dose based on BMI     Has risks for JOLLY and large PA on CT.   Get echo    Meets DM criteria  Check hgb a1c  sliding scale coverage     Check TSH lipid profile.                    
Interval History:      Patient minimal flatus but less abdominal pain, feeling improved      ngt out on clear liquis    HPI:      70 y/o male with PMHx of  bipolar disorder, PAD, HTN; presents to ED BIBEMS c/o L-sided chest pain and is s/p syncopal episode today after going to the bathroom preceded by multiple episode of diarrhea, abdominal pain, sudden onset dizziness, blurry vision and loss of balance, states diarrhea and weakness x2 days. Denies HA.  CT scan showed large vental hernia, with multiple transition points.  hernia was reduced but has recurred, but obstructive symptoms seem to be improved.  echo done ef EF 55%  hgb a1c 8.1      Review of Systems:   sob better, no fevers, slight abdominal pain but better, no fevers, no chills, tolerating clears      PMH:        HTN (hypertension)  PAD (peripheral artery disease)  Bipolar disorder  Anxiety  CHF with unknown LVEF      MEDICATIONS  (STANDING):  dextrose 5%. 1000 milliLiter(s) (100 mL/Hr) IV Continuous <Continuous>  dextrose 5%. 1000 milliLiter(s) (50 mL/Hr) IV Continuous <Continuous>  dextrose 50% Injectable 25 Gram(s) IV Push once  dextrose 50% Injectable 12.5 Gram(s) IV Push once  dextrose 50% Injectable 25 Gram(s) IV Push once  enoxaparin Injectable 40 milliGRAM(s) SubCutaneous every 12 hours  glucagon  Injectable 1 milliGRAM(s) IntraMuscular once  influenza  Vaccine (HIGH DOSE) 0.7 milliLiter(s) IntraMuscular once  insulin lispro (ADMELOG) corrective regimen sliding scale   SubCutaneous three times a day before meals  metoprolol tartrate Injectable 2.5 milliGRAM(s) IV Push every 6 hours  pantoprazole  Injectable 40 milliGRAM(s) IV Push daily  sodium chloride 0.9%. 1000 milliLiter(s) (150 mL/Hr) IV Continuous <Continuous>    MEDICATIONS  (PRN):  acetaminophen   IVPB .. 1000 milliGRAM(s) IV Intermittent once PRN Temp greater or equal to 38C (100.4F), Mild Pain (1 - 3)  dextrose Oral Gel 15 Gram(s) Oral once PRN Blood Glucose LESS THAN 70 milliGRAM(s)/deciliter  ketorolac   Injectable 15 milliGRAM(s) IV Push every 6 hours PRN Moderate Pain (4 - 6)  morphine  - Injectable 2 milliGRAM(s) IV Push every 4 hours PRN Severe Pain (7 - 10)        Height (cm): 182.9 (02-16 @ 16:06)  Weight (kg): 136.1 (02-16 @ 16:06)  BMI (kg/m2): 40.7 (02-16 @ 16:06)    ICU Vital Signs Last 24 Hrs  T(C): 36.7 (17 Feb 2024 07:56), Max: 36.7 (16 Feb 2024 16:06)  T(F): 98 (17 Feb 2024 07:56), Max: 98 (16 Feb 2024 16:06)  HR: 84 (17 Feb 2024 12:00) (66 - 98)  BP: 126/71 (17 Feb 2024 12:00) (126/71 - 150/81)  BP(mean): 88 (17 Feb 2024 12:00) (88 - 106)  ABP: --  ABP(mean): --  RR: 17 (17 Feb 2024 12:00) (14 - 20)  SpO2: 95% (17 Feb 2024 12:00) (91% - 100%)    O2 Parameters below as of 17 Feb 2024 12:00  Patient On (Oxygen Delivery Method): nasal cannula  O2 Flow (L/min): 3    Physical Exam    General: NAD, obese  HEENT:  No JVD   PULM: bilateral BS , dec at bases  CVS: s1 s2 reg  ABD:  binder NT , protuberant  EXT: no edema   SKIN: warm   Neuro: Alert oriented NF     I&O's Summary    16 Feb 2024 07:01  -  17 Feb 2024 07:00  --------------------------------------------------------  IN: 648 mL / OUT: 1075 mL / NET: -427 mL                         14.7   8.99  )-----------( 363      ( 17 Feb 2024 05:35 )             43.6       02-17    137  |  104  |  16  ----------------------------<  182<H>  4.2   |  27  |  0.66    Ca    8.5      17 Feb 2024 05:35  Phos  3.3     02-17  Mg     1.9     02-17    TPro  7.5  /  Alb  3.6  /  TBili  0.6  /  DBili  x   /  AST  14<L>  /  ALT  21  /  AlkPhos  88  02-16    Urinalysis Basic - ( 17 Feb 2024 05:35 )    Color: x / Appearance: x / SG: x / pH: x  Gluc: 182 mg/dL / Ketone: x  / Bili: x / Urobili: x   Blood: x / Protein: x / Nitrite: x   Leuk Esterase: x / RBC: x / WBC x   Sq Epi: x / Non Sq Epi: x / Bacteria: x    DVT Prophylaxis: Lovenox 40 bid                                                                 Advanced Directives: full code      Labs, Notes, Orders, radiologic studies reviewed and care coordinated with multidisciplinary team              
Pt seen at bedside, resting, more awake  Persistent abdominal pain at the hernia site  Pt denies vomiting, fever, chills    Vitals:  T(C): 36.3 ( @ 01:10), Max: 36.7 ( @ 16:06)  HR: 95 ( @ 01:10) (66 - 95)  BP: 126/83 ( @ 01:10) (126/83 - 143/76)  RR: 14 ( @ 01:10) (14 - 20)  SpO2: 100% ( @ 01:10) (95% - 100%)      Physical Exam:  General: AAOx3,  NAD, NGT in place with brown output  Chest: Normal respiratory effort  Heart: RRR  Abdomen: large pannus, distended, ventral hernia reducible, tender to palpation, abdominal binder in place  Neuro/Psych: No localized deficits. Normal spech, normal tone  Skin: Normal, no rashes, no lesions noted.        @ 17:32                    13.7  CBC: 11.37>)-------(<367                     41.2                 134 | 99 | 21    CMP:  ----------------------< 220               4.3 | 30 | 0.84                      Ca:9.5  Phos:-  M.1               0.6|      |14        LFTs:  ------|88|-----             -|      |-      Current Inpatient Medications:  acetaminophen   IVPB .. 1000 milliGRAM(s) IV Intermittent once PRN  dextrose 5%. 1000 milliLiter(s) (100 mL/Hr) IV Continuous <Continuous>  dextrose 5%. 1000 milliLiter(s) (50 mL/Hr) IV Continuous <Continuous>  dextrose 50% Injectable 25 Gram(s) IV Push once  dextrose 50% Injectable 12.5 Gram(s) IV Push once  dextrose 50% Injectable 25 Gram(s) IV Push once  dextrose Oral Gel 15 Gram(s) Oral once PRN  enoxaparin Injectable 40 milliGRAM(s) SubCutaneous every 12 hours  glucagon  Injectable 1 milliGRAM(s) IntraMuscular once  insulin lispro (ADMELOG) corrective regimen sliding scale   SubCutaneous three times a day before meals  ketorolac   Injectable 15 milliGRAM(s) IV Push every 6 hours PRN  metoprolol tartrate Injectable 2.5 milliGRAM(s) IV Push every 6 hours  morphine  - Injectable 2 milliGRAM(s) IV Push every 4 hours PRN  pantoprazole  Injectable 40 milliGRAM(s) IV Push daily  sodium chloride 0.9%. 1000 milliLiter(s) (150 mL/Hr) IV Continuous <Continuous>      
Interval History:      Patient had bm      slight abdomnal pain      advanced to Regular diet    HPI:  68 y/o male with PMHx of  bipolar disorder, PAD, HTN; presents to ED BIBEMS c/o L-sided chest pain and is s/p syncopal episode today after going to the bathroom preceded by multiple episode of diarrhea, abdominal pain, sudden onset dizziness, blurry vision and loss of balance, states diarrhea and weakness x2 days. Denies HA.  CT scan showed large vental hernia, with multiple transition points.  hernia was reduced but had recurred, but obstructive symptoms seem to be improved.  echo done ef EF 55%  hgb a1c 8.1      Review of Systems:   sob better, no fevers, slight abdominal pain but better, no fevers, no chills, tolerating clears, had bm   no headache, ros otherwise negative     MEDICATIONS  (STANDING):  aspirin  chewable 81 milliGRAM(s) Oral daily  atorvastatin 40 milliGRAM(s) Oral at bedtime  buprenorphine 8 mG/naloxone 2 mG SL Film 1 Film(s) SubLingual two times a day  dextrose 5%. 1000 milliLiter(s) (50 mL/Hr) IV Continuous <Continuous>  dextrose 5%. 1000 milliLiter(s) (100 mL/Hr) IV Continuous <Continuous>  dextrose 50% Injectable 12.5 Gram(s) IV Push once  dextrose 50% Injectable 25 Gram(s) IV Push once  dextrose 50% Injectable 25 Gram(s) IV Push once  enoxaparin Injectable 40 milliGRAM(s) SubCutaneous every 12 hours  glucagon  Injectable 1 milliGRAM(s) IntraMuscular once  influenza  Vaccine (HIGH DOSE) 0.7 milliLiter(s) IntraMuscular once  insulin lispro (ADMELOG) corrective regimen sliding scale   SubCutaneous three times a day before meals  losartan 50 milliGRAM(s) Oral daily  pantoprazole  Injectable 40 milliGRAM(s) IV Push daily  pregabalin 50 milliGRAM(s) Oral two times a day    MEDICATIONS  (PRN):  acetaminophen   IVPB .. 1000 milliGRAM(s) IV Intermittent once PRN Temp greater or equal to 38C (100.4F), Mild Pain (1 - 3)  amphetamine/dextroamphetamine 20 milliGRAM(s) Oral three times a day PRN anziety  dextrose Oral Gel 15 Gram(s) Oral once PRN Blood Glucose LESS THAN 70 milliGRAM(s)/deciliter  ketorolac   Injectable 15 milliGRAM(s) IV Push every 6 hours PRN Moderate Pain (4 - 6)  morphine  - Injectable 2 milliGRAM(s) IV Push every 4 hours PRN Severe Pain (7 - 10)    ICU Vital Signs Last 24 Hrs  T(C): 36.8 (18 Feb 2024 09:03), Max: 36.9 (18 Feb 2024 06:02)  T(F): 98.2 (18 Feb 2024 09:03), Max: 98.5 (18 Feb 2024 06:02)  HR: 90 (18 Feb 2024 10:00) (69 - 100)  BP: 146/86 (18 Feb 2024 10:00) (99/66 - 146/86)  BP(mean): 104 (18 Feb 2024 10:00) (74 - 104)  ABP: --  ABP(mean): --  RR: 18 (18 Feb 2024 10:00) (12 - 22)  SpO2: 94% (18 Feb 2024 10:00) (93% - 100%)    O2 Parameters below as of 18 Feb 2024 07:00  Patient On (Oxygen Delivery Method): room air      Physical Exam    General: NAD, obese  HEENT:  No JVD   PULM: bilateral BS , dec at bases  CVS: s1 s2 reg  ABD:   ventral hernia, non tender, no reducible  EXT: no edema   SKIN: warm   Neuro: Alert oriented  non focal    I&O's Summary    17 Feb 2024 07:01  -  18 Feb 2024 07:00  --------------------------------------------------------  IN: 1800 mL / OUT: 2350 mL / NET: -550 mL    18 Feb 2024 07:01  -  18 Feb 2024 11:55  --------------------------------------------------------  IN: 0 mL / OUT: 700 mL / NET: -700 mL                        13.4   9.11  )-----------( 336      ( 18 Feb 2024 05:46 )             42.4       02-18    138  |  110<H>  |  13  ----------------------------<  146<H>  4.0   |  28  |  0.69    Ca    8.3<L>      18 Feb 2024 05:46  Phos  2.0     02-18  Mg     1.9     02-18    TPro  7.5  /  Alb  3.6  /  TBili  0.6  /  DBili  x   /  AST  14<L>  /  ALT  21  /  AlkPhos  88  02-16    Urinalysis Basic - ( 18 Feb 2024 05:46 )    Color: x / Appearance: x / SG: x / pH: x  Gluc: 146 mg/dL / Ketone: x  / Bili: x / Urobili: x   Blood: x / Protein: x / Nitrite: x   Leuk Esterase: x / RBC: x / WBC x   Sq Epi: x / Non Sq Epi: x / Bacteria: x      DVT Prophylaxis:   Lovenox                                                              Advanced Directives: Full Code         Labs, Notes, Orders, radiologic studies reviewed and care coordinated with multidisciplinary team

## 2024-02-19 NOTE — DISCHARGE NOTE PROVIDER - NSDCMRMEDTOKEN_GEN_ALL_CORE_FT
aspirin 81 mg oral tablet, chewable: 1 tab(s) orally once a day  atorvastatin 40 mg oral tablet: 1 tab(s) orally once a day  losartan 50 mg oral tablet: 1 tab(s) orally once a day  metoprolol tartrate 25 mg oral tablet: 1 tab(s) orally 2 times a day  Tylenol 325 mg oral tablet: 2 tab(s) orally every 6 to 8 hours, As Needed

## 2024-02-25 DIAGNOSIS — Z82.49 FAMILY HISTORY OF ISCHEMIC HEART DISEASE AND OTHER DISEASES OF THE CIRCULATORY SYSTEM: ICD-10-CM

## 2024-02-25 DIAGNOSIS — R55 SYNCOPE AND COLLAPSE: ICD-10-CM

## 2024-02-25 DIAGNOSIS — F31.9 BIPOLAR DISORDER, UNSPECIFIED: ICD-10-CM

## 2024-02-25 DIAGNOSIS — K43.6 OTHER AND UNSPECIFIED VENTRAL HERNIA WITH OBSTRUCTION, WITHOUT GANGRENE: ICD-10-CM

## 2024-02-25 DIAGNOSIS — R15.9 FULL INCONTINENCE OF FECES: ICD-10-CM

## 2024-02-25 DIAGNOSIS — I11.0 HYPERTENSIVE HEART DISEASE WITH HEART FAILURE: ICD-10-CM

## 2024-02-25 DIAGNOSIS — Z79.82 LONG TERM (CURRENT) USE OF ASPIRIN: ICD-10-CM

## 2024-02-25 DIAGNOSIS — I50.9 HEART FAILURE, UNSPECIFIED: ICD-10-CM

## 2024-02-25 DIAGNOSIS — F41.9 ANXIETY DISORDER, UNSPECIFIED: ICD-10-CM

## 2024-02-25 DIAGNOSIS — E66.01 MORBID (SEVERE) OBESITY DUE TO EXCESS CALORIES: ICD-10-CM

## 2024-02-25 DIAGNOSIS — R33.9 RETENTION OF URINE, UNSPECIFIED: ICD-10-CM

## 2024-02-25 DIAGNOSIS — R31.9 HEMATURIA, UNSPECIFIED: ICD-10-CM

## 2024-02-25 DIAGNOSIS — I73.89 OTHER SPECIFIED PERIPHERAL VASCULAR DISEASES: ICD-10-CM

## 2024-08-05 PROBLEM — Z12.11 SCREEN FOR COLON CANCER: Status: ACTIVE | Noted: 2024-08-05

## 2024-08-28 NOTE — PHYSICAL THERAPY INITIAL EVALUATION ADULT - MODALITIES TREATMENT COMMENTS
[facility]  Subjective:     Patient ID: Chely Kramer is a 75 y.o. female.  Chief Complaint:  Hospital follow up  HPI:  Patient is a 75 y.o. female who presents for the above chief complaint.     Recently have colon resection for diverticulitis.  She was doing very well.  Does not have pain.  Back to her baseline functional status.  No additional medical complaints today.     To dermatology for skin check    Has systolic murmur.  Needs echo scheduled  Current Outpatient Medications   Medication Instructions    acetaminophen (TYLENOL) 650 mg, Oral, Every 6 hours PRN    ascorbic acid (vitamin C) (VITAMIN C) 2,500 mg, Oral, Daily    brimonidine 0.2% (ALPHAGAN) 0.2 % Drop 1 drop, Both Eyes, 2 times daily    latanoprost 0.005 % ophthalmic solution 1 drop, Both Eyes, Nightly    oxyCODONE (ROXICODONE) 5 mg, Oral, Every 4 hours PRN     Objective:      Body mass index is 28.33 kg/m².  Vitals:    08/28/24 1000   BP: 125/60   Pulse: 74   SpO2: 96%   Weight: 65.8 kg (145 lb 1 oz)   Height: 5' (1.524 m)   PainSc: 0-No pain     Physical Exam  Constitutional:       Appearance: Normal appearance.   Cardiovascular:      Rate and Rhythm: Normal rate.      Heart sounds: Murmur heard.   Pulmonary:      Effort: Pulmonary effort is normal. No respiratory distress.      Breath sounds: No wheezing.   Abdominal:      General: Abdomen is flat.   Musculoskeletal:      Right lower leg: No edema.      Left lower leg: No edema.   Skin:     General: Skin is warm and dry.   Neurological:      General: No focal deficit present.      Mental Status: She is alert.   Psychiatric:         Mood and Affect: Mood normal.           Assessment:       1. Skin cancer screening    2. Systolic murmur    3. Hospital discharge follow-up        Plan:   Reviewed prior labs.  Does not have diabetes or hyperlipidemia.  Normal renal function.  Blood pressure is normal today.  Read smoking history.  Quit more than 20 he was ago.  Otherwise  Pt left as rec'd sitting OOB in chair with SICU HM, BP Cuff, Pulse Oxym, Glass and Abdominal Binder all intact. CBIR. pt instructed to not get up alone. TYLER [place] up-to-date on health maintenance screenings..  Needs TTE scheduled for systolic murmur.  This was ordered previously      Skin cancer screening  -     Ambulatory referral/consult to Dermatology; Future; Expected date: 09/04/2024    Systolic murmur    Hospital discharge follow-up        All of your core healthy metrics are met.    No follow-ups on file. or sooner prn (as needed)          Boubacar Mcfadden  [facility]  [address] [address]  Phone [phone]  Fax [phone]    This note is dictated using the M*Modal Fluency Direct word recognition program. It may contain word recognition mistakes or wrong word substitutions (commonly he/she and is/was substitutions) that were missed on review.

## 2024-09-11 NOTE — PATIENT PROFILE ADULT. - PURPOSEFUL PROACTIVE ROUNDING
MST triggered for weight loss. Patient stated \"unsure\". Per chart review patient's weight has been stable or slightly increasing over the past year. MST incorrect. No nutrition intervention indicated at this time. RD available via consult. Will follow per policy.     Dena Washington MS, RD, LDN  Contact via Epic Secure Chat     Patient

## 2025-02-24 ENCOUNTER — INPATIENT (INPATIENT)
Facility: HOSPITAL | Age: 70
LOS: 3 days | Discharge: HOME HEALTH | DRG: 175 | End: 2025-02-28
Attending: HOSPITALIST | Admitting: SURGERY
Payer: MEDICARE

## 2025-02-24 ENCOUNTER — RESULT REVIEW (OUTPATIENT)
Age: 70
End: 2025-02-24

## 2025-02-24 VITALS
SYSTOLIC BLOOD PRESSURE: 95 MMHG | HEART RATE: 118 BPM | OXYGEN SATURATION: 86 % | RESPIRATION RATE: 18 BRPM | DIASTOLIC BLOOD PRESSURE: 78 MMHG | WEIGHT: 176.37 LBS

## 2025-02-24 DIAGNOSIS — I26.99 OTHER PULMONARY EMBOLISM WITHOUT ACUTE COR PULMONALE: ICD-10-CM

## 2025-02-24 DIAGNOSIS — Z98.890 OTHER SPECIFIED POSTPROCEDURAL STATES: Chronic | ICD-10-CM

## 2025-02-24 DIAGNOSIS — R09.89 OTHER SPECIFIED SYMPTOMS AND SIGNS INVOLVING THE CIRCULATORY AND RESPIRATORY SYSTEMS: ICD-10-CM

## 2025-02-24 LAB
ALBUMIN SERPL ELPH-MCNC: 3.7 G/DL — SIGNIFICANT CHANGE UP (ref 3.3–5)
ALP SERPL-CCNC: 84 U/L — SIGNIFICANT CHANGE UP (ref 40–120)
ALT FLD-CCNC: 40 U/L — SIGNIFICANT CHANGE UP (ref 12–78)
ANION GAP SERPL CALC-SCNC: 9 MMOL/L — SIGNIFICANT CHANGE UP (ref 5–17)
APTT BLD: 35.6 SEC — SIGNIFICANT CHANGE UP (ref 24.5–35.6)
APTT BLD: > 200 SEC (ref 24.5–35.6)
AST SERPL-CCNC: 25 U/L — SIGNIFICANT CHANGE UP (ref 15–37)
BASE EXCESS BLDV CALC-SCNC: 2.1 MMOL/L — SIGNIFICANT CHANGE UP (ref -2–3)
BASOPHILS # BLD AUTO: 0.13 K/UL — SIGNIFICANT CHANGE UP (ref 0–0.2)
BASOPHILS NFR BLD AUTO: 0.8 % — SIGNIFICANT CHANGE UP (ref 0–2)
BILIRUB SERPL-MCNC: 0.8 MG/DL — SIGNIFICANT CHANGE UP (ref 0.2–1.2)
BUN SERPL-MCNC: 19 MG/DL — SIGNIFICANT CHANGE UP (ref 7–23)
CALCIUM SERPL-MCNC: 9.9 MG/DL — SIGNIFICANT CHANGE UP (ref 8.5–10.1)
CHLORIDE SERPL-SCNC: 98 MMOL/L — SIGNIFICANT CHANGE UP (ref 96–108)
CO2 SERPL-SCNC: 26 MMOL/L — SIGNIFICANT CHANGE UP (ref 22–31)
CREAT SERPL-MCNC: 1.18 MG/DL — SIGNIFICANT CHANGE UP (ref 0.5–1.3)
D DIMER BLD IA.RAPID-MCNC: 2491 NG/ML DDU — HIGH
EGFR: 66 ML/MIN/1.73M2 — SIGNIFICANT CHANGE UP
EOSINOPHIL # BLD AUTO: 0.22 K/UL — SIGNIFICANT CHANGE UP (ref 0–0.5)
EOSINOPHIL NFR BLD AUTO: 1.4 % — SIGNIFICANT CHANGE UP (ref 0–6)
GAS PNL BLDV: SIGNIFICANT CHANGE UP
GLUCOSE BLDC GLUCOMTR-MCNC: 166 MG/DL — HIGH (ref 70–99)
GLUCOSE BLDC GLUCOMTR-MCNC: 173 MG/DL — HIGH (ref 70–99)
GLUCOSE BLDC GLUCOMTR-MCNC: 271 MG/DL — HIGH (ref 70–99)
GLUCOSE SERPL-MCNC: 348 MG/DL — HIGH (ref 70–99)
HCO3 BLDV-SCNC: 29 MMOL/L — SIGNIFICANT CHANGE UP (ref 22–29)
HCT VFR BLD CALC: 44.1 % — SIGNIFICANT CHANGE UP (ref 39–50)
HCT VFR BLD CALC: 47.6 % — SIGNIFICANT CHANGE UP (ref 39–50)
HGB BLD-MCNC: 14.8 G/DL — SIGNIFICANT CHANGE UP (ref 13–17)
HGB BLD-MCNC: 15.3 G/DL — SIGNIFICANT CHANGE UP (ref 13–17)
IMM GRANULOCYTES # BLD AUTO: 0.1 K/UL — HIGH (ref 0–0.07)
IMM GRANULOCYTES NFR BLD AUTO: 0.7 % — SIGNIFICANT CHANGE UP (ref 0–0.9)
INR BLD: 0.93 RATIO — SIGNIFICANT CHANGE UP (ref 0.85–1.16)
LACTATE SERPL-SCNC: 2.7 MMOL/L — HIGH (ref 0.7–2)
LACTATE SERPL-SCNC: 3 MMOL/L — HIGH (ref 0.7–2)
LACTATE SERPL-SCNC: 3.7 MMOL/L — HIGH (ref 0.7–2)
LYMPHOCYTES # BLD AUTO: 3.57 K/UL — HIGH (ref 1–3.3)
LYMPHOCYTES NFR BLD AUTO: 23.2 % — SIGNIFICANT CHANGE UP (ref 13–44)
MAGNESIUM SERPL-MCNC: 2.1 MG/DL — SIGNIFICANT CHANGE UP (ref 1.6–2.6)
MCHC RBC-ENTMCNC: 27.7 PG — SIGNIFICANT CHANGE UP (ref 27–34)
MCHC RBC-ENTMCNC: 28.5 PG — SIGNIFICANT CHANGE UP (ref 27–34)
MCHC RBC-ENTMCNC: 32.1 G/DL — SIGNIFICANT CHANGE UP (ref 32–36)
MCHC RBC-ENTMCNC: 33.6 G/DL — SIGNIFICANT CHANGE UP (ref 32–36)
MCV RBC AUTO: 84.8 FL — SIGNIFICANT CHANGE UP (ref 80–100)
MCV RBC AUTO: 86.1 FL — SIGNIFICANT CHANGE UP (ref 80–100)
MONOCYTES # BLD AUTO: 0.75 K/UL — SIGNIFICANT CHANGE UP (ref 0–0.9)
MONOCYTES NFR BLD AUTO: 4.9 % — SIGNIFICANT CHANGE UP (ref 2–14)
NEUTROPHILS # BLD AUTO: 10.61 K/UL — HIGH (ref 1.8–7.4)
NEUTROPHILS NFR BLD AUTO: 69 % — SIGNIFICANT CHANGE UP (ref 43–77)
NRBC # BLD AUTO: 0 K/UL — SIGNIFICANT CHANGE UP (ref 0–0)
NRBC # BLD AUTO: 0 K/UL — SIGNIFICANT CHANGE UP (ref 0–0)
NRBC # FLD: 0 K/UL — SIGNIFICANT CHANGE UP (ref 0–0)
NRBC # FLD: 0 K/UL — SIGNIFICANT CHANGE UP (ref 0–0)
NRBC BLD AUTO-RTO: 0 /100 WBCS — SIGNIFICANT CHANGE UP (ref 0–0)
NRBC BLD AUTO-RTO: 0 /100 WBCS — SIGNIFICANT CHANGE UP (ref 0–0)
NT-PROBNP SERPL-SCNC: 449 PG/ML — HIGH (ref 0–125)
NT-PROBNP SERPL-SCNC: 49 PG/ML — SIGNIFICANT CHANGE UP (ref 0–125)
PCO2 BLDV: 52 MMHG — SIGNIFICANT CHANGE UP (ref 42–55)
PH BLDV: 7.35 — SIGNIFICANT CHANGE UP (ref 7.32–7.43)
PHOSPHATE SERPL-MCNC: 3.3 MG/DL — SIGNIFICANT CHANGE UP (ref 2.5–4.5)
PLATELET # BLD AUTO: 324 K/UL — SIGNIFICANT CHANGE UP (ref 150–400)
PLATELET # BLD AUTO: 377 K/UL — SIGNIFICANT CHANGE UP (ref 150–400)
PMV BLD: 9 FL — SIGNIFICANT CHANGE UP (ref 7–13)
PMV BLD: 9.1 FL — SIGNIFICANT CHANGE UP (ref 7–13)
PO2 BLDV: 25 MMHG — SIGNIFICANT CHANGE UP (ref 25–45)
POTASSIUM SERPL-MCNC: 4.8 MMOL/L — SIGNIFICANT CHANGE UP (ref 3.5–5.3)
POTASSIUM SERPL-SCNC: 4.8 MMOL/L — SIGNIFICANT CHANGE UP (ref 3.5–5.3)
PROT SERPL-MCNC: 7.4 GM/DL — SIGNIFICANT CHANGE UP (ref 6–8.3)
PROTHROM AB SERPL-ACNC: 11 SEC — SIGNIFICANT CHANGE UP (ref 9.9–13.4)
RBC # BLD: 5.2 M/UL — SIGNIFICANT CHANGE UP (ref 4.2–5.8)
RBC # BLD: 5.53 M/UL — SIGNIFICANT CHANGE UP (ref 4.2–5.8)
RBC # FLD: 13 % — SIGNIFICANT CHANGE UP (ref 10.3–14.5)
RBC # FLD: 13.1 % — SIGNIFICANT CHANGE UP (ref 10.3–14.5)
SAO2 % BLDV: 39 % — LOW (ref 67–88)
SODIUM SERPL-SCNC: 133 MMOL/L — LOW (ref 135–145)
TROPONIN I, HIGH SENSITIVITY RESULT: 1535.63 NG/L — HIGH
TROPONIN I, HIGH SENSITIVITY RESULT: 792.85 NG/L — HIGH
WBC # BLD: 13.89 K/UL — HIGH (ref 3.8–10.5)
WBC # BLD: 15.38 K/UL — HIGH (ref 3.8–10.5)
WBC # FLD AUTO: 13.89 K/UL — HIGH (ref 3.8–10.5)
WBC # FLD AUTO: 15.38 K/UL — HIGH (ref 3.8–10.5)

## 2025-02-24 PROCEDURE — 85384 FIBRINOGEN ACTIVITY: CPT

## 2025-02-24 PROCEDURE — 97116 GAIT TRAINING THERAPY: CPT | Mod: GP

## 2025-02-24 PROCEDURE — 86901 BLOOD TYPING SEROLOGIC RH(D): CPT

## 2025-02-24 PROCEDURE — 93970 EXTREMITY STUDY: CPT

## 2025-02-24 PROCEDURE — 83036 HEMOGLOBIN GLYCOSYLATED A1C: CPT

## 2025-02-24 PROCEDURE — 94761 N-INVAS EAR/PLS OXIMETRY MLT: CPT

## 2025-02-24 PROCEDURE — 83735 ASSAY OF MAGNESIUM: CPT

## 2025-02-24 PROCEDURE — 87640 STAPH A DNA AMP PROBE: CPT

## 2025-02-24 PROCEDURE — 71045 X-RAY EXAM CHEST 1 VIEW: CPT | Mod: 26

## 2025-02-24 PROCEDURE — 82962 GLUCOSE BLOOD TEST: CPT

## 2025-02-24 PROCEDURE — 93010 ELECTROCARDIOGRAM REPORT: CPT

## 2025-02-24 PROCEDURE — 83605 ASSAY OF LACTIC ACID: CPT

## 2025-02-24 PROCEDURE — 36415 COLL VENOUS BLD VENIPUNCTURE: CPT

## 2025-02-24 PROCEDURE — 99291 CRITICAL CARE FIRST HOUR: CPT

## 2025-02-24 PROCEDURE — 93306 TTE W/DOPPLER COMPLETE: CPT | Mod: 26

## 2025-02-24 PROCEDURE — 93970 EXTREMITY STUDY: CPT | Mod: 26

## 2025-02-24 PROCEDURE — 85730 THROMBOPLASTIN TIME PARTIAL: CPT

## 2025-02-24 PROCEDURE — 71275 CT ANGIOGRAPHY CHEST: CPT | Mod: 26

## 2025-02-24 PROCEDURE — 85027 COMPLETE CBC AUTOMATED: CPT

## 2025-02-24 PROCEDURE — 86900 BLOOD TYPING SEROLOGIC ABO: CPT

## 2025-02-24 PROCEDURE — 99292 CRITICAL CARE ADDL 30 MIN: CPT

## 2025-02-24 PROCEDURE — 87641 MR-STAPH DNA AMP PROBE: CPT

## 2025-02-24 PROCEDURE — 85385 FIBRINOGEN ANTIGEN: CPT

## 2025-02-24 PROCEDURE — 84100 ASSAY OF PHOSPHORUS: CPT

## 2025-02-24 PROCEDURE — 99285 EMERGENCY DEPT VISIT HI MDM: CPT

## 2025-02-24 PROCEDURE — 86850 RBC ANTIBODY SCREEN: CPT

## 2025-02-24 PROCEDURE — 83880 ASSAY OF NATRIURETIC PEPTIDE: CPT

## 2025-02-24 PROCEDURE — 74177 CT ABD & PELVIS W/CONTRAST: CPT | Mod: MC

## 2025-02-24 PROCEDURE — 84484 ASSAY OF TROPONIN QUANT: CPT

## 2025-02-24 PROCEDURE — 85025 COMPLETE CBC W/AUTO DIFF WBC: CPT

## 2025-02-24 PROCEDURE — 80053 COMPREHEN METABOLIC PANEL: CPT

## 2025-02-24 PROCEDURE — 97163 PT EVAL HIGH COMPLEX 45 MIN: CPT | Mod: GP

## 2025-02-24 RX ORDER — SODIUM CHLORIDE 9 G/1000ML
1000 INJECTION, SOLUTION INTRAVENOUS
Refills: 0 | Status: DISCONTINUED | OUTPATIENT
Start: 2025-02-24 | End: 2025-02-28

## 2025-02-24 RX ORDER — INSULIN LISPRO 100 U/ML
INJECTION, SOLUTION INTRAVENOUS; SUBCUTANEOUS EVERY 6 HOURS
Refills: 0 | Status: DISCONTINUED | OUTPATIENT
Start: 2025-02-24 | End: 2025-02-25

## 2025-02-24 RX ORDER — HEPARIN SODIUM 1000 [USP'U]/ML
10000 INJECTION INTRAVENOUS; SUBCUTANEOUS EVERY 6 HOURS
Refills: 0 | Status: DISCONTINUED | OUTPATIENT
Start: 2025-02-24 | End: 2025-02-25

## 2025-02-24 RX ORDER — BUPRENORPHINE 7.5 UG/H
1 PATCH, EXTENDED RELEASE TRANSDERMAL
Refills: 0 | DISCHARGE

## 2025-02-24 RX ORDER — DEXTROSE 50 % IN WATER 50 %
15 SYRINGE (ML) INTRAVENOUS ONCE
Refills: 0 | Status: DISCONTINUED | OUTPATIENT
Start: 2025-02-24 | End: 2025-02-28

## 2025-02-24 RX ORDER — HEPARIN SODIUM 1000 [USP'U]/ML
INJECTION INTRAVENOUS; SUBCUTANEOUS
Qty: 25000 | Refills: 0 | Status: DISCONTINUED | OUTPATIENT
Start: 2025-02-24 | End: 2025-02-25

## 2025-02-24 RX ORDER — METFORMIN HYDROCHLORIDE 850 MG/1
1 TABLET ORAL
Refills: 0 | DISCHARGE

## 2025-02-24 RX ORDER — DEXTROSE 50 % IN WATER 50 %
25 SYRINGE (ML) INTRAVENOUS ONCE
Refills: 0 | Status: DISCONTINUED | OUTPATIENT
Start: 2025-02-24 | End: 2025-02-28

## 2025-02-24 RX ORDER — GLUCAGON 3 MG/1
1 POWDER NASAL ONCE
Refills: 0 | Status: DISCONTINUED | OUTPATIENT
Start: 2025-02-24 | End: 2025-02-28

## 2025-02-24 RX ORDER — ATORVASTATIN CALCIUM 80 MG/1
40 TABLET, FILM COATED ORAL AT BEDTIME
Refills: 0 | Status: DISCONTINUED | OUTPATIENT
Start: 2025-02-24 | End: 2025-02-28

## 2025-02-24 RX ORDER — HEPARIN SODIUM 1000 [USP'U]/ML
10000 INJECTION INTRAVENOUS; SUBCUTANEOUS ONCE
Refills: 0 | Status: COMPLETED | OUTPATIENT
Start: 2025-02-24 | End: 2025-02-24

## 2025-02-24 RX ORDER — PREGABALIN 50 MG/1
1 CAPSULE ORAL
Refills: 0 | DISCHARGE

## 2025-02-24 RX ORDER — DEXTROSE 50 % IN WATER 50 %
12.5 SYRINGE (ML) INTRAVENOUS ONCE
Refills: 0 | Status: DISCONTINUED | OUTPATIENT
Start: 2025-02-24 | End: 2025-02-28

## 2025-02-24 RX ORDER — BUPRENORPHINE 7.5 UG/H
8 PATCH, EXTENDED RELEASE TRANSDERMAL
Refills: 0 | Status: DISCONTINUED | OUTPATIENT
Start: 2025-02-24 | End: 2025-02-28

## 2025-02-24 RX ORDER — INFLUENZA A VIRUS A/IDAHO/07/2018 (H1N1) ANTIGEN (MDCK CELL DERIVED, PROPIOLACTONE INACTIVATED, INFLUENZA A VIRUS A/INDIANA/08/2018 (H3N2) ANTIGEN (MDCK CELL DERIVED, PROPIOLACTONE INACTIVATED), INFLUENZA B VIRUS B/SINGAPORE/INFTT-16-0610/2016 ANTIGEN (MDCK CELL DERIVED, PROPIOLACTONE INACTIVATED), INFLUENZA B VIRUS B/IOWA/06/2017 ANTIGEN (MDCK CELL DERIVED, PROPIOLACTONE INACTIVATED) 15; 15; 15; 15 UG/.5ML; UG/.5ML; UG/.5ML; UG/.5ML
0.5 INJECTION, SUSPENSION INTRAMUSCULAR ONCE
Refills: 0 | Status: DISCONTINUED | OUTPATIENT
Start: 2025-02-24 | End: 2025-02-24

## 2025-02-24 RX ORDER — HEPARIN SODIUM 1000 [USP'U]/ML
5000 INJECTION INTRAVENOUS; SUBCUTANEOUS EVERY 6 HOURS
Refills: 0 | Status: DISCONTINUED | OUTPATIENT
Start: 2025-02-24 | End: 2025-02-25

## 2025-02-24 RX ORDER — ASPIRIN 325 MG
81 TABLET ORAL DAILY
Refills: 0 | Status: DISCONTINUED | OUTPATIENT
Start: 2025-02-24 | End: 2025-02-28

## 2025-02-24 RX ORDER — BUPRENORPHINE HYDROCHLORIDE, NALOXONE HYDROCHLORIDE 4; 1 MG/1; MG/1
1 FILM, SOLUBLE BUCCAL; SUBLINGUAL
Refills: 0 | Status: DISCONTINUED | OUTPATIENT
Start: 2025-02-24 | End: 2025-02-24

## 2025-02-24 RX ORDER — DEXTROAMPHETAMINE SACCHARATE, AMPHETAMINE ASPARTATE MONOHYDRATE, DEXTROAMPHETAMINE SULFATE AND AMPHETAMINE SULFATE 2.5; 2.5; 2.5; 2.5 MG/1; MG/1; MG/1; MG/1
1 CAPSULE, EXTENDED RELEASE ORAL
Refills: 0 | DISCHARGE

## 2025-02-24 RX ADMIN — ATORVASTATIN CALCIUM 40 MILLIGRAM(S): 80 TABLET, FILM COATED ORAL at 19:26

## 2025-02-24 RX ADMIN — BUPRENORPHINE 8 MILLIGRAM(S): 7.5 PATCH, EXTENDED RELEASE TRANSDERMAL at 22:59

## 2025-02-24 RX ADMIN — HEPARIN SODIUM 0 UNIT(S)/HR: 1000 INJECTION INTRAVENOUS; SUBCUTANEOUS at 23:24

## 2025-02-24 RX ADMIN — HEPARIN SODIUM 2300 UNIT(S)/HR: 1000 INJECTION INTRAVENOUS; SUBCUTANEOUS at 18:44

## 2025-02-24 RX ADMIN — INSULIN LISPRO 6: 100 INJECTION, SOLUTION INTRAVENOUS; SUBCUTANEOUS at 19:26

## 2025-02-24 RX ADMIN — HEPARIN SODIUM 10000 UNIT(S): 1000 INJECTION INTRAVENOUS; SUBCUTANEOUS at 16:55

## 2025-02-24 RX ADMIN — BUPRENORPHINE 8 MILLIGRAM(S): 7.5 PATCH, EXTENDED RELEASE TRANSDERMAL at 22:05

## 2025-02-24 RX ADMIN — HEPARIN SODIUM 2300 UNIT(S)/HR: 1000 INJECTION INTRAVENOUS; SUBCUTANEOUS at 16:55

## 2025-02-24 NOTE — ED ADULT NURSE NOTE - OBJECTIVE STATEMENT
Pt presents to the ED c.o SOB. Pt reports waking up this morning SOB, dizzy and light headed. Pt denies chest pain. Upon arrival, pt diaphoretic, O2 sat 86% on RA,. Pt is on 81mg aspirin,

## 2025-02-24 NOTE — ED ADULT NURSE NOTE - NSFALLRISKINTERV_ED_ALL_ED

## 2025-02-24 NOTE — H&P ADULT - VTE RISK ASSESSMENT
Rajesh Sahu MD 10/11/2021    <<--- Click to launch

## 2025-02-24 NOTE — CONSULT NOTE ADULT - ASSESSMENT
69 y/o M, poor historian, with PMH CHF with unknown LVEF, anxiety, bipolar disorder, PAD, DM, HTN, who presents to the ED BIBEMS 2/24 from home for shortness of breath, generalized weakness, diaphoresis, and dizziness that started on the day of presentation. Patient reports that he woke from sleep at 1300, and shortly after had sudden onset of SOB, lightheadedness, and diaphoresis. Patient denies recent surgery/recent travel, history of VTE, but endorses recent b/l ankle swelling.   CTA revealing b/l PE, RHS on ECHO    - agree with admission to critical care   - agree with IV heparin until therapeutic and transition to appropriate oral anti coagulation   - ECHO to r/o RV Strain - done in ED;  - Doppler of LEs to r/o DVT- done in ED   - patient not a candidate for any urgent thrombectomy at this time; clots are more distal.  - will reassess patient status in AM, if hemodynamically unstable or requiring more o2 requirements, patient maybe a candidate for CDT  - properly document if patient is using supplement oxygen or not;  any change in o2 requirements can change treatment  -monitor for close signs of decompensation   - patient educated on CTA findings, RV strain, biochemical markers and further testing and various treatment modalities of managing pulmonary embolisms   - plan of care discussed with patient, RN, primary team and vascular cardiology attending

## 2025-02-24 NOTE — H&P ADULT - HISTORY OF PRESENT ILLNESS
Mr. Clark is a 71 y/o M, poor historian, with PMH CHF with unknown LVEF, anxiety, bipolar disorder, PAD, DM, HTN, ?OUD (reports he is on suboxone, unknown dose) who presents to the ED BIBEMS 2/24 from home for shortness of breath, generalized weakness, diaphoresis, and dizziness that started on the day of presentation. Patient reports that he woke from sleep at 1300, and shortly after had sudden onset of SOB, lightheadedness, and diaphoresis. Patient denies recent surgery/recent travel, history of VTE, but endorses recent b/l ankle swelling.     Patient found to have b/l PE with associated RV strain. ICU was consulted.  Mr. Clark is a 69 y/o M, poor historian, with PMH CHF with unknown LVEF, anxiety, bipolar disorder, PAD, DM, HTN, prior OUD (reportedly last use 20 years ago, on suboxone 8mg/2mg BID) who presents to the ED BIBEMS 2/24 from home for shortness of breath, generalized weakness, diaphoresis, and dizziness that started on the day of presentation. Patient reports that he woke from sleep at 1300, and shortly after had sudden onset of SOB, lightheadedness, and diaphoresis. Patient denies recent surgery/recent travel, history of VTE, but endorses recent b/l ankle swelling.     Patient found to have b/l PE with associated RV strain. ICU was consulted.

## 2025-02-24 NOTE — ED ADULT TRIAGE NOTE - CHIEF COMPLAINT QUOTE
Pt BIBEMS from home c/o sob, generalized weakness, diaphoresis and dizziness beginning this am. MD Novak and primary RN at bedside. Pt taken directly to trauma after EKG in triage, PMhx CHF, DM and HTN, Pt denies chest pain, fevers, or n/v/d.

## 2025-02-24 NOTE — H&P ADULT - NSHPPHYSICALEXAM_GEN_ALL_CORE
General: Ill appearing, in moderate distress     HEENT: Pupils equal, reactive to light. Symmetric. No scleral icterus or injection.    PULM: Clear to auscultation B/L. Tachypneic with increased work of breathing     NECK: Supple, no lymphadenopathy, trachea midline.    CVS: Tachycardic    ABD: Ventral hernia noted. Soft, nondistended, nontender, normoactive bowel sounds.    EXT: LE Edema     SKIN: Diaphoretic, pale     NEURO: Alert, oriented, interactive, nonfocal.

## 2025-02-24 NOTE — H&P ADULT - NSHPREVIEWOFSYSTEMS_GEN_ALL_CORE
Review of Systems:    CONSTITUTIONAL: No fever, chills, or fatigue.  EYES: No eye pain, visual disturbances, or discharge.  ENMT:  No difficulty hearing, tinnitus, or vertigo. No sinus or throat pain.  NECK: No pain or stiffness.  RESPIRATORY: +SOB  CARDIOVASCULAR: + dizziness, and leg swelling.  GASTROINTESTINAL: No abdominal or epigastric pain. No nausea, vomiting,  diarrhea, or constipation. No hematemesis, melena, or hematochezia.  GENITOURINARY: No dysuria, frequency, hematuria, or incontinence.  NEUROLOGICAL: No headaches, memory loss, loss of strength, numbness, or tremors.  SKIN: No itching, burning, rashes, or lesions.  MUSCULOSKELETAL: No joint pain or swelling; No muscle, back, or extremity pain.  PSYCHIATRIC: No depression, anxiety, mood swings, or difficulty sleeping.

## 2025-02-24 NOTE — ED PROVIDER NOTE - PROGRESS NOTE DETAILS
On my read of the CT, patient with large right-sided pulmonary embolism, will start heparin infusion, patient becoming more hypoxic will start high flow nasal cannula. Received call for radiology, patient confirmed with bilateral PEs with right heart strain.  Spoke with interventional cardiology attending Dr. Alicia who is aware of the case and will review images, ICU consulted and will see patient shortly as patient has hemodynamic instability and hypoxia in the setting of right heart strain.

## 2025-02-24 NOTE — H&P ADULT - ASSESSMENT
Mr. Clark is a 71 y/o M, poor historian, with PMH CHF with unknown LVEF, anxiety, bipolar disorder, PAD, DM, HTN, ?OUD, admitted with b/l PE with associated right heart strain, hypoxia, SOB, requiring NIV.     Plan:  Neuro:   - Reportedly on suboxone at home, unsure dose     CV:  - POCUS with RV enlargement, mcconnells sign   - CTA demonstrates b/l PEs  - Interventional cardiology attending Dr Alicia aware  - BP soft, may require levophed  - Heparin infusion for full AC     Pulm:   - B/l PEs, hypoxic, requiring HFNC. Actively titrating to maintain SpO2 >92%    GI  - NPO for now in case pt needs to go to cath lab     Renal:   - CMP pending     ID  - Low suspicion for infectious process at this time     Endo:   - Hx diabetes, hyperglycemic on arrival. ISS for glycemic control.     Will discuss case with attending intensivist.     Critical Care Time (EXCLUSIVE of any non bundled procedures) :  90 minutes were spent assessing the patient's presenting problems of acute illness that pose a high probability of life threatening  deterioration or end organ damage / dysfunction.  Medical decision making includes initiation / continuation of plan or care review data/ labwork/ radiographic study, direct patient care at bedside, discussions with consultants regarding care, evaluation and interpretation of vital signs, any necessary ventilator management, NIV or BIPAP changes or initiation, discussions with multidisciplinary team,  am or pm rounds, discussions of goals of care with patient and family, all non-inclusive of procedures.    Date of entry of this note is equal to the date of services rendered Mr. Clark is a 69 y/o M, poor historian, with PMH CHF with unknown LVEF, anxiety, bipolar disorder, PAD, DM, HTN, ?OUD, admitted with b/l PE with associated right heart strain, hypoxia, SOB, requiring NIV.     Plan:  Neuro:   - Chronically on suboxone, continue home dose BID     CV:  - POCUS with RV enlargement, mcconnells sign   - CTA demonstrates b/l PEs  - Interventional cardiology/PERT attending Dr Alicia aware, will monitor on heparin infusion for now  - BP soft, may require levophed  - Heparin infusion for full AC     Pulm:   - B/l PEs, hypoxic, requiring HFNC. Actively titrating to maintain SpO2 >92%    GI  - NPO after midnight for possible cath lab tomorrow     Renal:   - CMP pending     ID  - Low suspicion for infectious process at this time     Endo:   - Hx diabetes, hyperglycemic on arrival. ISS for glycemic control.     Will discuss case with attending intensivist.     Critical Care Time (EXCLUSIVE of any non bundled procedures) :  90 minutes were spent assessing the patient's presenting problems of acute illness that pose a high probability of life threatening  deterioration or end organ damage / dysfunction.  Medical decision making includes initiation / continuation of plan or care review data/ labwork/ radiographic study, direct patient care at bedside, discussions with consultants regarding care, evaluation and interpretation of vital signs, any necessary ventilator management, NIV or BIPAP changes or initiation, discussions with multidisciplinary team,  am or pm rounds, discussions of goals of care with patient and family, all non-inclusive of procedures.    Date of entry of this note is equal to the date of services rendered

## 2025-02-24 NOTE — CONSULT NOTE ADULT - CRITICAL CARE ATTENDING COMMENT
Agree with above. Images reviewed with large clot burden but distally past trunus. Intermediate high risk PE by echo, severely dilated. Plan for heparin and if no improvement, plan for catheter directed lysis. Findings relayed to patient and primary team.    Thank you for allowing me to participate in the care of your patient. Feel free to contact me if any clinical issues arise via contact information below or MS Teams.    Hari Alicia MD, [facility], [facility]   Director, Interventional Cardiology, [address], [address] Floor, [address] Office: [phone]  [place] Office: [phone]  Email: [email or web address]

## 2025-02-24 NOTE — H&P ADULT - NSHPLABSRESULTS_GEN_ALL_CORE
< from: CT Angio Chest PE Protocol w/ IV Cont (02.24.25 @ 16:32) >    IMPRESSION:    Bilateral pulmonary emboli involving all lobes, and associated right   heart strain. This was discussed with Dr. Novak at 4:49 PM on 2/4/2025,   with readback confirmation.    Increased size of nonspecific lymph node adjacent to the distal   esophagus. Recommend follow-up CT chest in 3-6 months.    --- End of Report ---    < end of copied text >

## 2025-02-24 NOTE — ED ADULT NURSE NOTE - CCCP TRG CHIEF CMPLNT
Return for increased pain fever vomiting, shortness of air or any other concerns. See your doctor  in 1 to 2 days for follow up.  Continue prednisone   shortness of breath

## 2025-02-24 NOTE — ED PROVIDER NOTE - CLINICAL SUMMARY MEDICAL DECISION MAKING FREE TEXT BOX
70y male w/ a PMHx of CHF with unknown LVEF, anxiety, bipolar disorder, PAD, DM, and HTN who presents to the ED BIBEMS from home for shortness of breath, generalized weakness, diaphoresis, and dizziness onset this morning. On arrival, patient is tachycardic and hypoxic on RA, was subsequently started on O2. Patient arrives diaphoretic, unwell appearing, tachycardic and hypoxic.  POCUS showing large RV compared to LV, A-line's bilateral lungs.  Patient rushed over to CT concern for large pulmonary embolism, patient normotensive.  Low suspicion for ACS or CHF or pneumonia.  Plan for labs EKG, telemetry, CTA chest

## 2025-02-24 NOTE — ED PROVIDER NOTE - OBJECTIVE STATEMENT
Pt is a 70y male w/ a PMHx of CHF with unknown LVEF, anxiety, bipolar disorder, PAD, DM, and HTN who presents to the ED BIBEMS from home for shortness of breath, generalized weakness, diaphoresis, and dizziness onset this morning. On arrival, patient is tachycardic and hypoxic on RA, was subsequently started on O2. 70y male w/ a PMHx of CHF with unknown LVEF, anxiety, bipolar disorder, PAD, DM, and HTN who presents to the ED BIBEMS from home for shortness of breath, generalized weakness, diaphoresis, and dizziness onset this morning. On arrival, patient is tachycardic and hypoxic on RA, was subsequently started on O2.

## 2025-02-24 NOTE — CONSULT NOTE ADULT - SUBJECTIVE AND OBJECTIVE BOX
[facility] Vascular Cardiology Team Note                                                              [facility] Faculty Practice                                              Office:  [address] [facility] 1st Floor - [facility] [address]                                                                     Telephone: [phone]. Fax:[phone]                                                                  VASCULAR  CARDIOLOGY CONSULTATION NOTE     Patient is a 70y old  Male who presents with a chief complaint of PE (24 Feb 2025 17:46)    Time Presented: 1600  Patient location at presentation: [x ] ED, [ ] Med/Surg regional, [ ] Med/Surg tele, [ ] ICU, [ ] Outside hospital transfer   Time of CT angiogram: 1632  Time PERT notified: 1700    HISTORY OF PRESENT ILLNESS:  HPI:  Mr. Clark is a 69 y/o M, poor historian, with PMH CHF with unknown LVEF, anxiety, bipolar disorder, PAD, DM, HTN, ?OUD (reports he is on suboxone, unknown dose) who presents to the ED BIBEMS 2/24 from home for shortness of breath, generalized weakness, diaphoresis, and dizziness that started on the day of presentation. Patient reports that he woke from sleep at 1300, and shortly after had sudden onset of SOB, lightheadedness, and diaphoresis. Patient denies recent surgery/recent travel, history of VTE, but endorses recent b/l ankle swelling.     Patient found to have b/l PE with associated RV strain. ICU was consulted.  (24 Feb 2025 17:46)  PCP_ [facility] but hasn't been there in years, CArdiologist Dr. Green but hasn't been seen in years. pt states [facility] manages all refills of medications;     Vascular cardiology consulted for PE, hypoxia requiring HFNC. CT reviewed, clots distal not a candidate for INARI. Patient with no conversational dyspnea. bedside ECHO done revealing RV dilation.       Allergies  No Known Allergies  Intolerances      	    MEDICATIONS:  MEDICATIONS  (STANDING):  aspirin  chewable 81 milliGRAM(s) Oral daily  atorvastatin 40 milliGRAM(s) Oral at bedtime  dextrose 5%. 1000 milliLiter(s) (50 mL/Hr) IV Continuous <Continuous>  dextrose 5%. 1000 milliLiter(s) (100 mL/Hr) IV Continuous <Continuous>  dextrose 50% Injectable 25 Gram(s) IV Push once  dextrose 50% Injectable 12.5 Gram(s) IV Push once  dextrose 50% Injectable 25 Gram(s) IV Push once  glucagon  Injectable 1 milliGRAM(s) IntraMuscular once  heparin  Infusion.  Unit(s)/Hr (23 mL/Hr) IV Continuous <Continuous>  insulin lispro (ADMELOG) corrective regimen sliding scale   SubCutaneous every 6 hours  pantoprazole  Injectable 40 milliGRAM(s) IV Push daily    Home Medications:  amLODIPine 5 mg oral tablet: 1 tab(s) orally once a day (24 Feb 2025 18:08)  aspirin 81 mg oral tablet, chewable: 1 tab(s) orally once a day (24 Feb 2025 18:09)  buprenorphine 8 mg sublingual tablet: 1 tab(s) sublingually 2 times a day (24 Feb 2025 18:08)  dextroamphetamine-amphetamine 20 mg oral tablet: 1 tab(s) orally 3 times a day (24 Feb 2025 18:08)  escitalopram 20 mg oral tablet: 1 tab(s) orally 2 times a day last filled for 90 day supply in October 2024, pt states that he is OUT OF SUPPLY at home (24 Feb 2025 18:09)  isosorbide mononitrate 30 mg oral tablet, extended release: 1 tab(s) orally once a day last filled for 90 day supply in July 2024, pt states that he still has supply and takes daily (24 Feb 2025 18:09)  lisinopril 2.5 mg oral tablet: 1 tab(s) orally once a day (24 Feb 2025 18:09)  metFORMIN 850 mg oral tablet: 1 tab(s) orally 2 times a day (24 Feb 2025 18:09)  metoprolol succinate 25 mg oral tablet, extended release: 1 tab(s) orally 2 times a day (24 Feb 2025 18:09)  pantoprazole 40 mg oral delayed release tablet: 1 tab(s) orally once a day last filled for 90 day supply in August 2024, pt states that he still has supply and takes daily (24 Feb 2025 18:09)  pregabalin 50 mg oral capsule: 1 cap(s) orally 2 times a day (24 Feb 2025 18:09)  Vitamin D3 25 mcg (1000 intl units) oral capsule: 1 cap(s) orally once a day (24 Feb 2025 18:09)      PAST MEDICAL & SURGICAL HISTORY:  Poor historian  HTN (hypertension)  PAD (peripheral artery disease)  Bipolar disorder  Anxiety  CHF with unknown LVEF  Poor historian  H/O hernia repair  H/O knee surgery      FAMILY HISTORY:  Family history of heart attack (Father)  Father- 52        SOCIAL HISTORY:   lives alone     REVIEW OF SYSTEMS:  CONSTITUTIONAL: No fever, weight loss, chills, shakes, or fatigue  EYES: No eye pain, visual disturbances, or discharge  ENMT:  No difficulty hearing, tinnitus, vertigo; No sinus or throat pain  NECK: No pain or stiffness  RESPIRATORY: No cough, wheezing, hemoptysis, +shortness of breath  CARDIOVASCULAR: No chest pain, + dyspnea, no palpitations,  no dizziness, syncope, paroxysmal nocturnal dyspnea, orthopnea, or arm . + leg swelling   GASTROINTESTINAL: No abdominal  or epigastric pain, nausea, vomiting, hematemesis, diarrhea, constipation, melena or bright red blood.  GENITOURINARY: No dysuria, nocturia, hematuria, or urinary incontinence  NEUROLOGICAL: No headaches, memory loss, slurred speech, limb weakness, loss of strength, numbness, or tremors  SKIN: No itching, burning, rashes, or lesions   MUSCULOSKELETAL: No joint pain or swelling, muscle, back, or extremity pain  PSYCHIATRIC: No depression, anxiety, or difficulty sleeping      PHYSICAL EXAM:  T(C): 36.4 (02-24-25 @ 16:16), Max: 36.4 (02-24-25 @ 16:16)  HR: 111 (02-24-25 @ 18:15) (111 - 122)  BP: 126/95 (02-24-25 @ 18:15) (85/72 - 126/95)  RR: 16 (02-24-25 @ 18:15) (10 - 23)  SpO2: 94% (02-24-25 @ 18:15) (86% - 98%)  Wt(kg): --  I&O's Summary      GENERAL: NAD, AAOx3  CHEST/LUNG: Clear to auscultation bilaterally; No wheeze  HEART: s1 s2 Regular rate and rhythm; No murmurs, rubs, or gallops  ABDOMEN: Soft, Nontender, Nondistended; Bowel sounds present X 4 quadrants   EXTREMITIES:  2+ Peripheral Pulses, No clubbing, cyanosis, or edema  SKIN: No rashes or lesions,  b/l LE not red, cool to touch,  no open skin no drainage; no pitting edema. R >L   NEURO: nonfocal CN/motor/sensory/reflexes  Psych: normal affect and behavior, calm and cooperative         LABS                        15.3   15.38 )-----------( 377      ( 24 Feb 2025 16:11 )             47.6     02-24    133[L]  |  98  |  19  ----------------------------<  348[H]  4.8   |  26  |  1.18    Ca    9.9      24 Feb 2025 16:11  Phos  3.3     02-24  Mg     2.1     02-24    TPro  7.4  /  Alb  3.7  /  TBili  0.8  /  DBili  x   /  AST  25  /  ALT  40  /  AlkPhos  84  02-24    PT/INR - ( 24 Feb 2025 16:11 )   PT: 11.0 sec;   INR: 0.93 ratio         PTT - ( 24 Feb 2025 16:11 )  PTT:35.6 sec    - TroponinI hsT: <-792.85  Pro-Brain Natriuretic Peptide: 49 pg/mL (02.24.25 @ 16:11)        Urinalysis Basic - ( 24 Feb 2025 16:11 )    Color: x / Appearance: x / SG: x / pH: x  Gluc: 348 mg/dL / Ketone: x  / Bili: x / Urobili: x   Blood: x / Protein: x / Nitrite: x   Leuk Esterase: x / RBC: x / WBC x   Sq Epi: x / Non Sq Epi: x / Bacteria: x          RADIOLOGY :    CTA- chest < from: CT Angio Chest PE Protocol w/ IV Cont (02.24.25 @ 16:32) >  IMPRESSION:    Bilateral pulmonary emboli involving all lobes, and associated right   heart strain. This was discussed with Dr. Novak at 4:49 PM on 2/4/2025,   with readback confirmation.    Increased size of nonspecific lymph node adjacent to the distal   esophagus. Recommend follow-up CT chest in 3-6 months.      < end of copied text >      US Duplex      ECHO full report pending     CXR    EKG sinus tach     REITE Bleeding Risk:   [ ] Recent Major Bleeding (2pt)  [ ] Creatinine > 1.2 mg/dL ( 1.5pt)  [ ] Anemia (<13 g/dL M, < 12 g/dL F) (1.5 pt)  [ ] Malignancy History (1pt)  [x ] Clinically - overt PE (1pt)  [ ] Age > 75 (1pt)  Total: 1    PESI score: class   Age >80 [ ], History of Cancer [ ], Hx of chronic cardiopulmonary disease [x ], Heart rate > 110 [ x], Systolic BP <100 [ ], SpO2 <90% [ ]

## 2025-02-24 NOTE — ED PROVIDER NOTE - PHYSICAL EXAMINATION
GEN: Patient awake alert  HEENT: normocephalic, atraumatic, EOMI, no scleral icterus, moist MM  CARDIAC: +Tachycardic   PULM: CTA B/L no wheeze, rhonchi, rales.   ABD: Large suprapubic/lower abdominal hernia w/ diastasis, nontender w/o skin changes.  MSK: Moving all extremities, no edema. 5/5 strength and full ROM in all extremities.     NEURO: A&Ox3, gait normal, no focal neurological deficits, CN 2-12 grossly intact  SKIN: +diaphoretic, warm, no rash. GEN: Patient awake alert  HEENT: normocephalic, atraumatic, EOMI, no scleral icterus, moist MM  CARDIAC: +Tachycardic   PULM: CTA B/L no wheeze, rhonchi, rales.   ABD: Large suprapubic/lower abdominal hernia w/ diastasis, nontender w/o skin changes.  MSK: Moving all extremities, 5/5 strength and full ROM in all extremities.     NEURO: A&Ox3, gait normal, no focal neurological deficits,  SKIN: +diaphoretic, warm, no rash.  4+ pitting edema bilateral lower extremities

## 2025-02-24 NOTE — PHARMACOTHERAPY INTERVENTION NOTE - COMMENTS
Medication reconciliation completed.  Reviewed Medication list and confirmed med allergies with patient; confirmed with Dr. First Medyolanda.

## 2025-02-25 LAB
A1C WITH ESTIMATED AVERAGE GLUCOSE RESULT: 7.9 % — HIGH (ref 4–5.6)
ALBUMIN SERPL ELPH-MCNC: 3.3 G/DL — SIGNIFICANT CHANGE UP (ref 3.3–5)
ALP SERPL-CCNC: 77 U/L — SIGNIFICANT CHANGE UP (ref 40–120)
ALT FLD-CCNC: 36 U/L — SIGNIFICANT CHANGE UP (ref 12–78)
ANION GAP SERPL CALC-SCNC: 5 MMOL/L — SIGNIFICANT CHANGE UP (ref 5–17)
APTT BLD: 120 SEC — CRITICAL HIGH (ref 24.5–35.6)
APTT BLD: 32.7 SEC — SIGNIFICANT CHANGE UP (ref 24.5–35.6)
APTT BLD: 53.8 SEC — HIGH (ref 24.5–35.6)
APTT BLD: 81 SEC — HIGH (ref 24.5–35.6)
AST SERPL-CCNC: 21 U/L — SIGNIFICANT CHANGE UP (ref 15–37)
BASOPHILS # BLD AUTO: 0.13 K/UL — SIGNIFICANT CHANGE UP (ref 0–0.2)
BASOPHILS # BLD MANUAL: 0 K/UL — SIGNIFICANT CHANGE UP (ref 0–0.2)
BASOPHILS NFR BLD AUTO: 1 % — SIGNIFICANT CHANGE UP (ref 0–2)
BASOPHILS NFR BLD MANUAL: 0 % — SIGNIFICANT CHANGE UP (ref 0–2)
BILIRUB SERPL-MCNC: 0.7 MG/DL — SIGNIFICANT CHANGE UP (ref 0.2–1.2)
BLD GP AB SCN SERPL QL: SIGNIFICANT CHANGE UP
BUN SERPL-MCNC: 24 MG/DL — HIGH (ref 7–23)
BURR CELLS BLD QL SMEAR: SLIGHT — SIGNIFICANT CHANGE UP
CALCIUM SERPL-MCNC: 9.4 MG/DL — SIGNIFICANT CHANGE UP (ref 8.5–10.1)
CHLORIDE SERPL-SCNC: 104 MMOL/L — SIGNIFICANT CHANGE UP (ref 96–108)
CO2 SERPL-SCNC: 26 MMOL/L — SIGNIFICANT CHANGE UP (ref 22–31)
CREAT SERPL-MCNC: 0.82 MG/DL — SIGNIFICANT CHANGE UP (ref 0.5–1.3)
DACRYOCYTES BLD QL SMEAR: SLIGHT — SIGNIFICANT CHANGE UP
EGFR: 94 ML/MIN/1.73M2 — SIGNIFICANT CHANGE UP
ELLIPTOCYTES BLD QL SMEAR: SLIGHT — SIGNIFICANT CHANGE UP
EOSINOPHIL # BLD AUTO: 0.06 K/UL — SIGNIFICANT CHANGE UP (ref 0–0.5)
EOSINOPHIL # BLD MANUAL: 0.13 K/UL — SIGNIFICANT CHANGE UP (ref 0–0.5)
EOSINOPHIL NFR BLD AUTO: 0.4 % — SIGNIFICANT CHANGE UP (ref 0–6)
EOSINOPHIL NFR BLD MANUAL: 1 % — SIGNIFICANT CHANGE UP (ref 0–6)
ESTIMATED AVERAGE GLUCOSE: 180 MG/DL — HIGH (ref 68–114)
FIBRINOGEN PPP-MCNC: 365 MG/DL — SIGNIFICANT CHANGE UP (ref 200–435)
FIBRINOGEN PPP-MCNC: 378 MG/DL — SIGNIFICANT CHANGE UP (ref 200–435)
GLUCOSE BLDC GLUCOMTR-MCNC: 131 MG/DL — HIGH (ref 70–99)
GLUCOSE BLDC GLUCOMTR-MCNC: 154 MG/DL — HIGH (ref 70–99)
GLUCOSE BLDC GLUCOMTR-MCNC: 173 MG/DL — HIGH (ref 70–99)
GLUCOSE BLDC GLUCOMTR-MCNC: 269 MG/DL — HIGH (ref 70–99)
GLUCOSE SERPL-MCNC: 177 MG/DL — HIGH (ref 70–99)
HCT VFR BLD CALC: 41.7 % — SIGNIFICANT CHANGE UP (ref 39–50)
HCT VFR BLD CALC: 42.4 % — SIGNIFICANT CHANGE UP (ref 39–50)
HCT VFR BLD CALC: 43.5 % — SIGNIFICANT CHANGE UP (ref 39–50)
HCT VFR BLD CALC: 44 % — SIGNIFICANT CHANGE UP (ref 39–50)
HGB BLD-MCNC: 13.6 G/DL — SIGNIFICANT CHANGE UP (ref 13–17)
HGB BLD-MCNC: 13.8 G/DL — SIGNIFICANT CHANGE UP (ref 13–17)
HGB BLD-MCNC: 14.2 G/DL — SIGNIFICANT CHANGE UP (ref 13–17)
HGB BLD-MCNC: 14.3 G/DL — SIGNIFICANT CHANGE UP (ref 13–17)
IMM GRANULOCYTES # BLD AUTO: 0.07 K/UL — SIGNIFICANT CHANGE UP (ref 0–0.07)
IMM GRANULOCYTES NFR BLD AUTO: 0.5 % — SIGNIFICANT CHANGE UP (ref 0–0.9)
LACTATE SERPL-SCNC: 1.7 MMOL/L — SIGNIFICANT CHANGE UP (ref 0.7–2)
LYMPHOCYTES # BLD AUTO: 4.26 K/UL — HIGH (ref 1–3.3)
LYMPHOCYTES # BLD MANUAL: 4.54 K/UL — HIGH (ref 1–3.3)
LYMPHOCYTES NFR BLD AUTO: 31.9 % — SIGNIFICANT CHANGE UP (ref 13–44)
LYMPHOCYTES NFR BLD MANUAL: 34 % — SIGNIFICANT CHANGE UP (ref 13–44)
MAGNESIUM SERPL-MCNC: 2.3 MG/DL — SIGNIFICANT CHANGE UP (ref 1.6–2.6)
MANUAL REACTIVE LYMPHOCYTES #: 0.67 K/UL — HIGH (ref 0–0.63)
MANUAL SMEAR VERIFICATION: SIGNIFICANT CHANGE UP
MCHC RBC-ENTMCNC: 27.8 PG — SIGNIFICANT CHANGE UP (ref 27–34)
MCHC RBC-ENTMCNC: 28 PG — SIGNIFICANT CHANGE UP (ref 27–34)
MCHC RBC-ENTMCNC: 28 PG — SIGNIFICANT CHANGE UP (ref 27–34)
MCHC RBC-ENTMCNC: 28.1 PG — SIGNIFICANT CHANGE UP (ref 27–34)
MCHC RBC-ENTMCNC: 32.5 G/DL — SIGNIFICANT CHANGE UP (ref 32–36)
MCHC RBC-ENTMCNC: 32.5 G/DL — SIGNIFICANT CHANGE UP (ref 32–36)
MCHC RBC-ENTMCNC: 32.6 G/DL — SIGNIFICANT CHANGE UP (ref 32–36)
MCHC RBC-ENTMCNC: 32.6 G/DL — SIGNIFICANT CHANGE UP (ref 32–36)
MCV RBC AUTO: 85.6 FL — SIGNIFICANT CHANGE UP (ref 80–100)
MCV RBC AUTO: 85.8 FL — SIGNIFICANT CHANGE UP (ref 80–100)
MCV RBC AUTO: 86.2 FL — SIGNIFICANT CHANGE UP (ref 80–100)
MCV RBC AUTO: 86.2 FL — SIGNIFICANT CHANGE UP (ref 80–100)
MONOCYTES # BLD AUTO: 0.93 K/UL — HIGH (ref 0–0.9)
MONOCYTES # BLD MANUAL: 0.27 K/UL — SIGNIFICANT CHANGE UP (ref 0–0.9)
MONOCYTES NFR BLD AUTO: 7 % — SIGNIFICANT CHANGE UP (ref 2–14)
MONOCYTES NFR BLD MANUAL: 2 % — SIGNIFICANT CHANGE UP (ref 2–14)
MRSA PCR RESULT.: SIGNIFICANT CHANGE UP
NEUTROPHILS # BLD AUTO: 7.89 K/UL — HIGH (ref 1.8–7.4)
NEUTROPHILS # BLD MANUAL: 7.74 K/UL — HIGH (ref 1.8–7.4)
NEUTROPHILS NFR BLD AUTO: 59.2 % — SIGNIFICANT CHANGE UP (ref 43–77)
NEUTROPHILS NFR BLD MANUAL: 58 % — SIGNIFICANT CHANGE UP (ref 43–77)
NRBC # BLD AUTO: 0 K/UL — SIGNIFICANT CHANGE UP (ref 0–0)
NRBC # FLD: 0 K/UL — SIGNIFICANT CHANGE UP (ref 0–0)
NRBC BLD AUTO-RTO: 0 /100 WBCS — SIGNIFICANT CHANGE UP (ref 0–0)
NT-PROBNP SERPL-SCNC: 2672 PG/ML — HIGH (ref 0–125)
OVALOCYTES BLD QL SMEAR: SLIGHT — SIGNIFICANT CHANGE UP
PHOSPHATE SERPL-MCNC: 3.9 MG/DL — SIGNIFICANT CHANGE UP (ref 2.5–4.5)
PLAT MORPH BLD: ABNORMAL
PLATELET # BLD AUTO: 250 K/UL — SIGNIFICANT CHANGE UP (ref 150–400)
PLATELET # BLD AUTO: 252 K/UL — SIGNIFICANT CHANGE UP (ref 150–400)
PLATELET # BLD AUTO: 292 K/UL — SIGNIFICANT CHANGE UP (ref 150–400)
PLATELET # BLD AUTO: 312 K/UL — SIGNIFICANT CHANGE UP (ref 150–400)
PMV BLD: 9 FL — SIGNIFICANT CHANGE UP (ref 7–13)
PMV BLD: 9.1 FL — SIGNIFICANT CHANGE UP (ref 7–13)
PMV BLD: 9.2 FL — SIGNIFICANT CHANGE UP (ref 7–13)
PMV BLD: 9.2 FL — SIGNIFICANT CHANGE UP (ref 7–13)
POTASSIUM SERPL-MCNC: 4.5 MMOL/L — SIGNIFICANT CHANGE UP (ref 3.5–5.3)
POTASSIUM SERPL-SCNC: 4.5 MMOL/L — SIGNIFICANT CHANGE UP (ref 3.5–5.3)
PROT SERPL-MCNC: 6.8 GM/DL — SIGNIFICANT CHANGE UP (ref 6–8.3)
RBC # BLD: 4.84 M/UL — SIGNIFICANT CHANGE UP (ref 4.2–5.8)
RBC # BLD: 4.92 M/UL — SIGNIFICANT CHANGE UP (ref 4.2–5.8)
RBC # BLD: 5.07 M/UL — SIGNIFICANT CHANGE UP (ref 4.2–5.8)
RBC # BLD: 5.14 M/UL — SIGNIFICANT CHANGE UP (ref 4.2–5.8)
RBC # FLD: 13.2 % — SIGNIFICANT CHANGE UP (ref 10.3–14.5)
RBC # FLD: 13.3 % — SIGNIFICANT CHANGE UP (ref 10.3–14.5)
RBC BLD AUTO: ABNORMAL
S AUREUS DNA NOSE QL NAA+PROBE: DETECTED
SCHISTOCYTES BLD QL AUTO: SLIGHT — SIGNIFICANT CHANGE UP
SODIUM SERPL-SCNC: 135 MMOL/L — SIGNIFICANT CHANGE UP (ref 135–145)
TROPONIN I, HIGH SENSITIVITY RESULT: 1366.72 NG/L — HIGH
VARIANT LYMPHS # BLD: 5 % — SIGNIFICANT CHANGE UP (ref 0–6)
VARIANT LYMPHS NFR BLD MANUAL: 5 % — SIGNIFICANT CHANGE UP (ref 0–6)
WBC # BLD: 11.8 K/UL — HIGH (ref 3.8–10.5)
WBC # BLD: 12.97 K/UL — HIGH (ref 3.8–10.5)
WBC # BLD: 13.19 K/UL — HIGH (ref 3.8–10.5)
WBC # BLD: 13.34 K/UL — HIGH (ref 3.8–10.5)
WBC # FLD AUTO: 11.8 K/UL — HIGH (ref 3.8–10.5)
WBC # FLD AUTO: 12.97 K/UL — HIGH (ref 3.8–10.5)
WBC # FLD AUTO: 13.19 K/UL — HIGH (ref 3.8–10.5)
WBC # FLD AUTO: 13.34 K/UL — HIGH (ref 3.8–10.5)
WBC MORPHOLOGY: ABNORMAL

## 2025-02-25 PROCEDURE — 99291 CRITICAL CARE FIRST HOUR: CPT

## 2025-02-25 RX ORDER — ALTEPLASE 2.2 MG/2ML
1 INJECTION, POWDER, LYOPHILIZED, FOR SOLUTION INTRAVENOUS
Qty: 10 | Refills: 0 | Status: DISCONTINUED | OUTPATIENT
Start: 2025-02-25 | End: 2025-02-26

## 2025-02-25 RX ORDER — HEPARIN SODIUM 1000 [USP'U]/ML
5000 INJECTION INTRAVENOUS; SUBCUTANEOUS EVERY 6 HOURS
Refills: 0 | Status: DISCONTINUED | OUTPATIENT
Start: 2025-02-25 | End: 2025-02-26

## 2025-02-25 RX ORDER — HEPARIN SODIUM 1000 [USP'U]/ML
800 INJECTION INTRAVENOUS; SUBCUTANEOUS
Qty: 25000 | Refills: 0 | Status: DISCONTINUED | OUTPATIENT
Start: 2025-02-25 | End: 2025-02-25

## 2025-02-25 RX ORDER — HEPARIN SODIUM 1000 [USP'U]/ML
10000 INJECTION INTRAVENOUS; SUBCUTANEOUS EVERY 6 HOURS
Refills: 0 | Status: DISCONTINUED | OUTPATIENT
Start: 2025-02-25 | End: 2025-02-26

## 2025-02-25 RX ORDER — HEPARIN SODIUM 1000 [USP'U]/ML
INJECTION INTRAVENOUS; SUBCUTANEOUS
Qty: 25000 | Refills: 0 | Status: DISCONTINUED | OUTPATIENT
Start: 2025-02-25 | End: 2025-02-26

## 2025-02-25 RX ADMIN — HEPARIN SODIUM 1900 UNIT(S)/HR: 1000 INJECTION INTRAVENOUS; SUBCUTANEOUS at 05:39

## 2025-02-25 RX ADMIN — ALTEPLASE 25 MG/HR: 2.2 INJECTION, POWDER, LYOPHILIZED, FOR SOLUTION INTRAVENOUS at 10:45

## 2025-02-25 RX ADMIN — BUPRENORPHINE 8 MILLIGRAM(S): 7.5 PATCH, EXTENDED RELEASE TRANSDERMAL at 12:10

## 2025-02-25 RX ADMIN — INSULIN LISPRO 6: 100 INJECTION, SOLUTION INTRAVENOUS; SUBCUTANEOUS at 12:15

## 2025-02-25 RX ADMIN — HEPARIN SODIUM 2300 UNIT(S)/HR: 1000 INJECTION INTRAVENOUS; SUBCUTANEOUS at 19:55

## 2025-02-25 RX ADMIN — HEPARIN SODIUM 8 UNIT(S)/HR: 1000 INJECTION INTRAVENOUS; SUBCUTANEOUS at 10:45

## 2025-02-25 RX ADMIN — BUPRENORPHINE 8 MILLIGRAM(S): 7.5 PATCH, EXTENDED RELEASE TRANSDERMAL at 22:41

## 2025-02-25 RX ADMIN — HEPARIN SODIUM 8 UNIT(S)/HR: 1000 INJECTION INTRAVENOUS; SUBCUTANEOUS at 19:30

## 2025-02-25 RX ADMIN — HEPARIN SODIUM 1900 UNIT(S)/HR: 1000 INJECTION INTRAVENOUS; SUBCUTANEOUS at 07:15

## 2025-02-25 RX ADMIN — BUPRENORPHINE 8 MILLIGRAM(S): 7.5 PATCH, EXTENDED RELEASE TRANSDERMAL at 23:15

## 2025-02-25 RX ADMIN — ALTEPLASE 25 MG/HR: 2.2 INJECTION, POWDER, LYOPHILIZED, FOR SOLUTION INTRAVENOUS at 18:18

## 2025-02-25 RX ADMIN — ATORVASTATIN CALCIUM 40 MILLIGRAM(S): 80 TABLET, FILM COATED ORAL at 22:41

## 2025-02-25 RX ADMIN — HEPARIN SODIUM 1600 UNIT(S)/HR: 1000 INJECTION INTRAVENOUS; SUBCUTANEOUS at 07:44

## 2025-02-25 RX ADMIN — INSULIN LISPRO 2: 100 INJECTION, SOLUTION INTRAVENOUS; SUBCUTANEOUS at 17:19

## 2025-02-25 RX ADMIN — Medication 40 MILLIGRAM(S): at 17:27

## 2025-02-25 RX ADMIN — INSULIN LISPRO 2: 100 INJECTION, SOLUTION INTRAVENOUS; SUBCUTANEOUS at 06:06

## 2025-02-25 RX ADMIN — Medication 75 MILLILITER(S): at 11:11

## 2025-02-25 RX ADMIN — HEPARIN SODIUM 1900 UNIT(S)/HR: 1000 INJECTION INTRAVENOUS; SUBCUTANEOUS at 00:28

## 2025-02-25 NOTE — PROGRESS NOTE ADULT - SUBJECTIVE AND OBJECTIVE BOX
Patient is a 70y old  Male who presents with a chief complaint of PE (24 Feb 2025 18:53)      BRIEF HOSPITAL COURSE:   70M with PMHx CHF, anxiety, bipolar disorder, PAD, HTN, prior substance abuse who presented with SOB, diaphoresis, and dizziness with sudden onset. Workup revealed extensive bilateral PE with RV strain, Echo with RV HK/[place], + biomarkers. PERT consulted. No hypotension noted. Initially placed on HFNC/NRB for hypoxia. Not a candidate for urgent cath direct thrombectomy given distal nature of clots. Started on IV AC and admitted to CCU.     Events last 24 hours: afebrile, HR has improved from 115 to low 100s overnight, remains with adequate MAP and systolic BP, + biomarkers, feels less labored and SOB now weaned from NRB on admit to 5L NC with sats>92%, on IV heparin infusion    PAST MEDICAL & SURGICAL HISTORY:  Poor historian      HTN (hypertension)      PAD (peripheral artery disease)      Bipolar disorder      Anxiety      CHF with unknown LVEF      Poor historian      H/O hernia repair      H/O knee surgery          Review of Systems:  12 pt ROS negative unless otherwise stated      Medications:        buprenorphine 8 milliGRAM(s) SubLingual <User Schedule>      aspirin  chewable 81 milliGRAM(s) Oral daily  heparin   Injectable 33679 Unit(s) IV Push every 6 hours PRN  heparin   Injectable 5000 Unit(s) IV Push every 6 hours PRN  heparin  Infusion.  Unit(s)/Hr IV Continuous <Continuous>    pantoprazole  Injectable 40 milliGRAM(s) IV Push daily      atorvastatin 40 milliGRAM(s) Oral at bedtime  dextrose 50% Injectable 25 Gram(s) IV Push once  dextrose 50% Injectable 12.5 Gram(s) IV Push once  dextrose 50% Injectable 25 Gram(s) IV Push once  dextrose Oral Gel 15 Gram(s) Oral once PRN  glucagon  Injectable 1 milliGRAM(s) IntraMuscular once  insulin lispro (ADMELOG) corrective regimen sliding scale   SubCutaneous every 6 hours    dextrose 5%. 1000 milliLiter(s) IV Continuous <Continuous>  dextrose 5%. 1000 milliLiter(s) IV Continuous <Continuous>                ICU Vital Signs Last 24 Hrs  T(C): 36.1 (24 Feb 2025 22:00), Max: 36.4 (24 Feb 2025 16:16)  T(F): 97 (24 Feb 2025 22:00), Max: 97.6 (24 Feb 2025 16:16)  HR: 107 (24 Feb 2025 23:00) (107 - 122)  BP: 94/67 (24 Feb 2025 23:00) (85/72 - 126/95)  BP(mean): 77 (24 Feb 2025 23:00) (77 - 105)  ABP: --  ABP(mean): --  RR: 13 (24 Feb 2025 23:00) (10 - 23)  SpO2: 96% (24 Feb 2025 23:00) (86% - 98%)    O2 Parameters below as of 24 Feb 2025 23:00  Patient On (Oxygen Delivery Method): nasal cannula  O2 Flow (L/min): 5      I&O's Summary                LABS:                        14.8   13.89 )-----------( 324      ( 24 Feb 2025 22:34 )             44.1     02-24    133[L]  |  98  |  19  ----------------------------<  348[H]  4.8   |  26  |  1.18    Ca    9.9      24 Feb 2025 16:11  Phos  3.3     02-24  Mg     2.1     02-24    TPro  7.4  /  Alb  3.7  /  TBili  0.8  /  DBili  x   /  AST  25  /  ALT  40  /  AlkPhos  84  02-24          CAPILLARY BLOOD GLUCOSE      POCT Blood Glucose.: 166 mg/dL (24 Feb 2025 23:21)    PT/INR - ( 24 Feb 2025 16:11 )   PT: 11.0 sec;   INR: 0.93 ratio         PTT - ( 24 Feb 2025 22:34 )  PTT:> 200 sec  Urinalysis Basic - ( 24 Feb 2025 16:11 )    Color: x / Appearance: x / SG: x / pH: x  Gluc: 348 mg/dL / Ketone: x  / Bili: x / Urobili: x   Blood: x / Protein: x / Nitrite: x   Leuk Esterase: x / RBC: x / WBC x   Sq Epi: x / Non Sq Epi: x / Bacteria: x          Physical Examination:    General: No acute distress.  Alert, oriented, interactive, nonfocal    HEENT: Pupils equal, reactive to light.  Symmetric.    PULM: Clear to auscultation bilaterally    CVS: Regular  rhythm, ST    ABD: Soft, nondistended, normoactive bowel sounds, no rebound or guarding    EXT: +LE edema, nontender    SKIN: Warm and well perfused    RADIOLOGY:   ACC: [id number] EXAM:  CT ANGIO CHEST PULM ART WAWIC   ORDERED BY: EJ MENENDEZ     PROCEDURE DATE:  02/24/2025          INTERPRETATION:  INDICATION: Rule out pulmonary embolus    TECHNIQUE: Helical acquisition of the chest after the administration of   70 mL of Omnipaque 350. Maximum intensity projection images were   generated.    COMPARISON: CT chest 2/16/2024.    FINDINGS:    HEART/VASCULATURE: Bilateral pulmonary thromboemboli in the right   interlobar, and all lobar arteries, as well as multiple more distal   branches. Right heart enlargement with RV: LV ratio greater than 1. No   pericardial effusion. Coronary artery calcification. Normal aortic size.    LUNGS/AIRWAYS/PLEURA: No endobronchial lesion. Clear lungs. No pleural   effusion.    LYMPH NODES/MEDIASTINUM: 1 cm short axis lymph node adjacent to the   distal esophagus (2-103), prior 0.5 cm.    UPPER ABDOMEN: Unremarkable.    BONES/SOFT TISSUES: Multilevel spondylosis.      IMPRESSION:    Bilateral pulmonary emboli involving all lobes, and associated right   heart strain. This was discussed with Dr. Menendez at 4:49 PM on 2/4/2025,   with readback confirmation.    Increased size of nonspecific lymph node adjacent to the distal   esophagus. Recommend follow-up CT chest in 3-6 months.    --- End of Report ---            MOJGAN MORLEY M.D., ATTENDING RADIOLOGIST  This document has been electronically signed. Feb 24 2025  4:49PM    ACC: [id number] EXAM:  US DPLX LWR EXT VEINS COMPL BI   ORDERED BY: EJ MENENDEZ     PROCEDURE DATE:  02/24/2025          INTERPRETATION:  CLINICAL INFORMATION: Shortness of breath. Pulmonary   emboli.    COMPARISON: CTA chest of the same day    TECHNIQUE: Duplex sonography of the BILATERAL LOWER extremity veins with   color and spectral Doppler, with and without compression.    FINDINGS:    RIGHT:  Normal compressibility of the RIGHT common femoral, femoral and popliteal   veins.  Doppler examination shows normal spontaneous and phasic flow.  No RIGHT calf vein thrombosis is detected.    Subcutaneous edema in the right calf.    LEFT:  Normal compressibility of the LEFT common femoral, femoral and popliteal   veins.  Doppler examination shows normal spontaneous and phasic flow.  No LEFT calf vein thrombosis is detected.    IMPRESSION:  No evidence of deep venous thrombosis in either lower extremity.            --- End of Report ---            ORLANDO ESCOBAR MD; Attending Radiologist  This document has been electronically signed. Feb 24 2025  6:10PM    TRANSTHORACIC ECHOCARDIOGRAM REPORT  ________________________________________________________________________________                                      _______    Portions of this table do not appear on this page.     Pt. Name:       RIDGE SCHULTE   Study Date:    2/24/2025  MRN:            [id number]     YOB: 1955  Accession #:    [id number]    Age:           70 years  Account#:       [id number] Gender:        M  Heart Rate:                  Height:        72.00 in (182.88 cm)  Rhythm:                      Weight:        286.00 lb (129.73 kg)  Blood Pressure: 97/60 mmHg   BSA/BMI:       2.48 m² / 38.79 kg/m²  ________________________________________________________________________________________  Referring Physician:    Kishore Childs  Interpreting Physician: Shorty Fairbanks MD  Primary Sonographer:    Christine Sherman [facility]    CPT:               ECHO TTE WO CON COMP W DOPP - 99901.m  Indication(s):     Other pulmonary embolism without acute cor pulmonale - I26.99  Procedure:         Transthoracic echocardiogram with 2-D, M-mode and complete                     spectral and color flow Doppler.  Ordering Location: [facility]  Admission Status:  Inpatient  Study Information: Image quality for this study is technically difficult.    _______________________________________________________________________________________     CONCLUSIONS:      1. Technically difficult image quality.   2. Left ventricular systolic function is normal with an ejection fraction visually estimated at 50 to 55 %.   3. Mild left ventricular hypertrophy.   4. Severely enlarged right ventricular cavity size and reduced right ventricular systolic function. Tricuspid annular plane systolic excursion (TAPSE) is 1.3 cm (normal >=1.7 cm).   5. The right atrium is severely dilated.   6. Moderate tricuspid regurgitation.   7. Estimated pulmonary artery systolic pressure is 47 mmHg, consistent with mild to moderate pulmonary hypertension.      Time spent on this patient encounter, which includes documenting this note in the electronic medical record, was 35 minutes including assessing the presenting problems with associated risks, reviewing the medical record to prepare for the encounter, and meeting face to face with patient to obtain additional history. I have also performed an appropriate physical exam, made interventions listed and ordered and interpreted appropriate diagnostic studies as documented. To improve communication and patient saftey, I have coordinated care with the multidisciplinary team including the bedside nurse, appropriate attending of record and consultants as needed. Date of entry of note is equal to date of services rendered.

## 2025-02-25 NOTE — PROGRESS NOTE ADULT - SUBJECTIVE AND OBJECTIVE BOX
Interval History:     Patient admitted with PE bilateral on 1/24 was on high flow now on nasal cannula    HPI:       70M with PMHx CHF, , PAD, HTN, prior substance abuse who presented with SOB, diaphoresis, and dizziness with sudden onset. Workup revealed extensive bilateral PE with RV strain,   biomarkers. . No hypotension noted. Initially placed on HFNC/NRB for hypoxia. Not a candidate for urgent cath direct thrombectomy given distal nature of clots. Started on IV AC and admitted to CCU.   afebrile,   Has bilateral lower extremity dopplers negative for dvt, has chornic lower abdominal swelling  Echo ef 55% with rv strain and moderate pulmonary htn    ROS no recent travel, no chest pain, sob, no abdomnal pain, no fevers, no chills       no hx. of clots, hx. PVD, no recent travel     MEDICATIONS  (STANDING):  aspirin  chewable 81 milliGRAM(s) Oral daily  atorvastatin 40 milliGRAM(s) Oral at bedtime  buprenorphine 8 milliGRAM(s) SubLingual <User Schedule>  dextrose 5%. 1000 milliLiter(s) (100 mL/Hr) IV Continuous <Continuous>  dextrose 5%. 1000 milliLiter(s) (50 mL/Hr) IV Continuous <Continuous>  dextrose 50% Injectable 25 Gram(s) IV Push once  dextrose 50% Injectable 12.5 Gram(s) IV Push once  dextrose 50% Injectable 25 Gram(s) IV Push once  glucagon  Injectable 1 milliGRAM(s) IntraMuscular once  heparin  Infusion.  Unit(s)/Hr (23 mL/Hr) IV Continuous <Continuous>  insulin lispro (ADMELOG) corrective regimen sliding scale   SubCutaneous every 6 hours  pantoprazole    Tablet 40 milliGRAM(s) Oral before breakfast  pantoprazole  Injectable 40 milliGRAM(s) IV Push daily    MEDICATIONS  (PRN):  dextrose Oral Gel 15 Gram(s) Oral once PRN Blood Glucose LESS THAN 70 milliGRAM(s)/deciliter  heparin   Injectable 20329 Unit(s) IV Push every 6 hours PRN For aPTT less than 40  heparin   Injectable 5000 Unit(s) IV Push every 6 hours PRN For aPTT between 40 - 57    Weight (kg): 130 (02-24 @ 16:16)    ICU Vital Signs Last 24 Hrs  T(C): 36.2 (25 Feb 2025 05:05), Max: 36.4 (24 Feb 2025 16:16)  T(F): 97.1 (25 Feb 2025 05:05), Max: 97.6 (24 Feb 2025 16:16)  HR: 96 (25 Feb 2025 07:00) (96 - 122)  BP: 105/92 (25 Feb 2025 07:00) (85/72 - 126/95)  BP(mean): 99 (25 Feb 2025 07:00) (77 - 105)  ABP: --  ABP(mean): --  RR: 16 (25 Feb 2025 07:00) (10 - 23)  SpO2: 96% (25 Feb 2025 07:00) (86% - 98%)    O2 Parameters below as of 25 Feb 2025 04:00  Patient On (Oxygen Delivery Method): nasal cannula  O2 Flow (L/min): 5    I&O's Summary    24 Feb 2025 07:01  -  25 Feb 2025 07:00  --------------------------------------------------------  IN: 195 mL / OUT: 0 mL / NET: 195 mL    Physical Exam    General - obese, no distress  HEENT - nc/at  neck supple  lungs clear  cv rrr  abdomen soft,   extremtieis bilateral edema  gu voiding                         14.3   13.34 )-----------( 312      ( 25 Feb 2025 05:30 )             44.0       02-25    135  |  104  |  24[H]  ----------------------------<  177[H]  4.5   |  26  |  0.82    Ca    9.4      25 Feb 2025 05:30  Phos  3.9     02-25  Mg     2.3     02-25    TPro  6.8  /  Alb  3.3  /  TBili  0.7  /  DBili  x   /  AST  21  /  ALT  36  /  AlkPhos  77  02-25    Urinalysis Basic - ( 25 Feb 2025 05:30 )    Color: x / Appearance: x / SG: x / pH: x  Gluc: 177 mg/dL / Ketone: x  / Bili: x / Urobili: x   Blood: x / Protein: x / Nitrite: x   Leuk Esterase: x / RBC: x / WBC x   Sq Epi: x / Non Sq Epi: x / Bacteria: x    DVT Prophylaxis:    IV Heparin                                                             Advanced Directives: Full COde     Sepsis Criteria Met - No    Labs, Notes, Orders, radiologic studies reviewed and care coordinated with multidisciplinary team

## 2025-02-25 NOTE — PROGRESS NOTE ADULT - ASSESSMENT
Impression:  1. acute PE with cor pulmonale/RV strain  2. hypoxia  3. SOB    Plan:  Neuro - nonfocal    CV -  hemodynamics stable, ST with improved rates overnight          no AV cl blockers          full AC with IV heparin, titration as per nomogram          PERT consulting          if status worsens or develops hemodynamic instability will consider for cath directed lysis in am or more urgent need for systemic lytics          repeat lactate, BNP, trop in am          f/u PERT team            Pulm -  nonlabored currently on 5L NC              actively titrating FiO2 for sats>92%, avoid hypoxia     GI -  NPO after MN    Renal - Cr stable              monitor I/Os     Heme -  full AC with IV heparin, titration as per nomogram               no signs of active bleeding               LE duplex negative for DVT    ID - no indication for abx at this time    Endo -  BS checks Q 6hours with ISS coverage, BS>200 prior, overnight euglycemic             check A1c              keep euglycemic 120-180mg/dl

## 2025-02-25 NOTE — PROGRESS NOTE ADULT - SUBJECTIVE AND OBJECTIVE BOX
[facility] Vascular Cardiology Team Note                                                              [facility]                                                                      Office:  [address] [facility] 1st Floor                                                                                  [facility] [address]                                                                     Telephone: [phone]. Fax:[phone]                                                                          VASCULAR CARDIOLOGY  NOTE     HISTORY OF PRESENT ILLNESS:  HPI:  Mr. Clark is a 69 y/o M, poor historian, with PMH CHF with unknown LVEF, anxiety, bipolar disorder, PAD, DM, HTN, prior OUD (reportedly last use 20 years ago, on suboxone 8mg/2mg BID) who presents to the ED BIBEMS 2/24 from home for shortness of breath, generalized weakness, diaphoresis, and dizziness that started on the day of presentation. Patient reports that he woke from sleep at 1300, and shortly after had sudden onset of SOB, lightheadedness, and diaphoresis. Patient denies recent surgery/recent travel, history of VTE, but endorses recent b/l ankle swelling.     Patient found to have b/l PE with associated RV strain. ICU was consulted.  (24 Feb 2025 17:46)  Patient found to have b/l PE with associated RV strain. ICU was consulted.  (24 Feb 2025 17:46)  [facility], CArdiologist Dr. Green but hasn't been seen in years. pt states [facility] all [facility];     Vascular cardiology consulted for PE, hypoxia requiring HFNC. CT reviewed, clots distal not a candidate for INARI. Patient with no conversational dyspnea. bedside ECHO done revealing RV dilation.     Interveral/overnight events: 2/25/24  Pt remained stable overnight, decreased HFNC to 5lpm NC, unable to lie flat,borderline hypotensive with tachycardia in setting of severe RV strain,  Pt agrees for CDT today.  Taken to cath lab at 09:00.     Allergies    No Known Allergies    Intolerances    	    MEDICATIONS:  MEDICATIONS  (STANDING):  alteplase    Infusion 1 mG/Hr (25 mL/Hr) IntraCatheter.. <Continuous>  alteplase    Infusion 1 mG/Hr (25 mL/Hr) IntraCatheter.. <Continuous>  aspirin  chewable 81 milliGRAM(s) Oral daily  atorvastatin 40 milliGRAM(s) Oral at bedtime  buprenorphine 8 milliGRAM(s) SubLingual <User Schedule>  dextrose 5%. 1000 milliLiter(s) (100 mL/Hr) IV Continuous <Continuous>  dextrose 5%. 1000 milliLiter(s) (50 mL/Hr) IV Continuous <Continuous>  dextrose 50% Injectable 25 Gram(s) IV Push once  dextrose 50% Injectable 12.5 Gram(s) IV Push once  dextrose 50% Injectable 25 Gram(s) IV Push once  glucagon  Injectable 1 milliGRAM(s) IntraMuscular once  heparin  Infusion 800 Unit(s)/Hr (8 mL/Hr) IV Continuous <Continuous>  insulin lispro (ADMELOG) corrective regimen sliding scale   SubCutaneous every 6 hours  pantoprazole    Tablet 40 milliGRAM(s) Oral before breakfast        PHYSICAL EXAM:  T(C): 37.1 (02-25-25 @ 09:14), Max: 37.1 (02-25-25 @ 09:14)  HR: 102 (02-25-25 @ 09:14) (96 - 122)  BP: 95/70 (02-25-25 @ 09:14) (85/72 - 126/95)  RR: 20 (02-25-25 @ 09:14) (10 - 23)  SpO2: 94% (02-25-25 @ 09:14) (86% - 99%)  Wt(kg): --  I&O's Summary    24 Feb 2025 07:01  -  25 Feb 2025 07:00  --------------------------------------------------------  IN: 195 mL / OUT: 0 mL / NET: 195 mL        GENERAL: NAD, AAOx3. morbid obesity   CHEST/LUNG: Clear to auscultation bilaterally; No wheeze  HEART: s1 s2 Regular rate and rhythm; No murmurs, rubs, or gallops  ABDOMEN: Soft, Nontender, Nondistended; Bowel sounds present X 4 quadrants   EXTREMITIES:  2+ Peripheral Pulses, No clubbing, cyanosis, or edema  SKIN: No rashes or lesions,  b/l LE not red, cool to touch,  no open skin no drainage  NEURO: nonfocal CN/motor/sensory/reflexes  Psych: normal affect and behavior, calm and cooperative       LABS                        14.3   13.34 )-----------( 312      ( 25 Feb 2025 05:30 )             44.0     02-25    135  |  104  |  24[H]  ----------------------------<  177[H]  4.5   |  26  |  0.82    Ca    9.4      25 Feb 2025 05:30  Phos  3.9     02-25  Mg     2.3     02-25    TPro  6.8  /  Alb  3.3  /  TBili  0.7  /  DBili  x   /  AST  21  /  ALT  36  /  AlkPhos  77  02-25    PT/INR - ( 24 Feb 2025 16:11 )   PT: 11.0 sec;   INR: 0.93 ratio         PTT - ( 25 Feb 2025 06:28 )  PTT:120.0 sec    RADIOLOGY :    US Duplex  < from: US Duplex Venous Lower Ext Complete, Bilateral (02.24.25 @ 18:06) >  FINDINGS:    RIGHT:  Normal compressibility of the RIGHT common femoral, femoral and popliteal   veins.  Doppler examination shows normal spontaneous and phasic flow.  No RIGHT calf vein thrombosis is detected.    Subcutaneous edema in the right calf.    LEFT:  Normal compressibility of the LEFT common femoral, femoral and popliteal   veins.  Doppler examination shows normal spontaneous and phasic flow.  No LEFT calf vein thrombosis is detected.    IMPRESSION:  No evidence of deep venous thrombosis in either lower extremity.        < end of copied text >    CTA- chest  < from: CT Angio Chest PE Protocol w/ IV Cont (02.24.25 @ 16:32) >  IMPRESSION:    Bilateral pulmonary emboli involving all lobes, and associated right   heart strain. This was discussed with Dr. Novak at 4:49 PM on 2/4/2025,   with readback confirmation.    Increased size of nonspecific lymph node adjacent to the distal   esophagus. Recommend follow-up CT chest in 3-6 months.    --- End of Report ---    < end of copied text >  ECHO  < from: TTE W or WO Ultrasound Enhancing Agent (02.24.25 @ 18:02) >  CONCLUSIONS:      1. Technically difficult image quality.   2. Left ventricular systolic function is normal with an ejection fraction visually estimated at 50 to 55 %.   3. Mild left ventricular hypertrophy.   4. Severely enlarged right ventricular cavity size and reduced right ventricular systolic function. Tricuspid annular plane systolic excursion (TAPSE) is 1.3 cm (normal >=1.7 cm).   5. The right atrium is severely dilated.   6. Moderate tricuspid regurgitation.   7. Estimated pulmonary artery systolic pressure is 47 mmHg, consistent with mild to moderate pulmonary hypertension.    < end of copied text >        Assessment/ Plan  Although patient improved from HRNC to 5lpm NC, pt is still SOB, borderline hypotensive/ tachycardia, RV strain is severe. Pt is not candidate for inari , however given severe RV strain and hypotension pt is candidate for CDT.   -Continue CCU monitoring  -dual CDT   -Alteplase gtts ( 10mg/250ml rate 1mg/hr x 12 hrs) concurrent with heparin gtts ( patient specific)   -Strict bedrest for 17hrs                                                                   [facility] Vascular Cardiology Team Note                                                              [facility]                                                                      Office:  [address] Cardiac Clinic 1st Floor                                                                                  [facility] [address]                                                                     Telephone: [phone]. Fax:[phone]                                                                          VASCULAR CARDIOLOGY  NOTE     HISTORY OF PRESENT ILLNESS:  HPI:  Mr. Clark is a 69 y/o M, poor historian, with PMH CHF with unknown LVEF, anxiety, bipolar disorder, PAD, DM, HTN, prior OUD (reportedly last use 20 years ago, on suboxone 8mg/2mg BID) who presents to the ED BIBEMS 2/24 from home for shortness of breath, generalized weakness, diaphoresis, and dizziness that started on the day of presentation. Patient reports that he woke from sleep at 1300, and shortly after had sudden onset of SOB, lightheadedness, and diaphoresis. Patient denies recent surgery/recent travel, history of VTE, but endorses recent b/l ankle swelling.     Patient found to have b/l PE with associated RV strain. ICU was consulted.  (24 Feb 2025 17:46)  Patient found to have b/l PE with associated RV strain. ICU was consulted.  (24 Feb 2025 17:46)  PCP_ [facility] but hasn't been there in years, CArdiologist Dr. Green but hasn't been seen in years. pt states [facility] manages all refills of medications;     Vascular cardiology consulted for PE, hypoxia requiring HFNC. CT reviewed, clots distal not a candidate for INARI. Patient with no conversational dyspnea. bedside ECHO done revealing RV dilation.     Interveral/overnight events: 2/25/24  Pt remained stable overnight, decreased HFNC to 5lpm NC, unable to lie flat,borderline hypotensive with tachycardia in setting of severe RV strain,  Pt agrees for CDT today.  Taken to cath lab at 09:00.     Allergies    No Known Allergies    Intolerances    	    MEDICATIONS:  MEDICATIONS  (STANDING):  alteplase    Infusion 1 mG/Hr (25 mL/Hr) IntraCatheter.. <Continuous>  alteplase    Infusion 1 mG/Hr (25 mL/Hr) IntraCatheter.. <Continuous>  aspirin  chewable 81 milliGRAM(s) Oral daily  atorvastatin 40 milliGRAM(s) Oral at bedtime  buprenorphine 8 milliGRAM(s) SubLingual <User Schedule>  dextrose 5%. 1000 milliLiter(s) (100 mL/Hr) IV Continuous <Continuous>  dextrose 5%. 1000 milliLiter(s) (50 mL/Hr) IV Continuous <Continuous>  dextrose 50% Injectable 25 Gram(s) IV Push once  dextrose 50% Injectable 12.5 Gram(s) IV Push once  dextrose 50% Injectable 25 Gram(s) IV Push once  glucagon  Injectable 1 milliGRAM(s) IntraMuscular once  heparin  Infusion 800 Unit(s)/Hr (8 mL/Hr) IV Continuous <Continuous>  insulin lispro (ADMELOG) corrective regimen sliding scale   SubCutaneous every 6 hours  pantoprazole    Tablet 40 milliGRAM(s) Oral before breakfast        PHYSICAL EXAM:  T(C): 37.1 (02-25-25 @ 09:14), Max: 37.1 (02-25-25 @ 09:14)  HR: 102 (02-25-25 @ 09:14) (96 - 122)  BP: 95/70 (02-25-25 @ 09:14) (85/72 - 126/95)  RR: 20 (02-25-25 @ 09:14) (10 - 23)  SpO2: 94% (02-25-25 @ 09:14) (86% - 99%)  Wt(kg): --  I&O's Summary    24 Feb 2025 07:01  -  25 Feb 2025 07:00  --------------------------------------------------------  IN: 195 mL / OUT: 0 mL / NET: 195 mL        GENERAL: NAD, AAOx3. morbid obesity   CHEST/LUNG: Clear to auscultation bilaterally; No wheeze  HEART: s1 s2 Regular rate and rhythm; No murmurs, rubs, or gallops  ABDOMEN: Soft, Nontender, Nondistended; Bowel sounds present X 4 quadrants   EXTREMITIES:  2+ Peripheral Pulses, No clubbing, cyanosis, or edema  SKIN: No rashes or lesions,  b/l LE not red, cool to touch,  no open skin no drainage  NEURO: nonfocal CN/motor/sensory/reflexes  Psych: normal affect and behavior, calm and cooperative       LABS                        14.3   13.34 )-----------( 312      ( 25 Feb 2025 05:30 )             44.0     02-25    135  |  104  |  24[H]  ----------------------------<  177[H]  4.5   |  26  |  0.82    Ca    9.4      25 Feb 2025 05:30  Phos  3.9     02-25  Mg     2.3     02-25    TPro  6.8  /  Alb  3.3  /  TBili  0.7  /  DBili  x   /  AST  21  /  ALT  36  /  AlkPhos  77  02-25    PT/INR - ( 24 Feb 2025 16:11 )   PT: 11.0 sec;   INR: 0.93 ratio    PTT - ( 25 Feb 2025 06:28 )  PTT:120.0 sec  Pro-Brain Natriuretic Peptide: 2672 pg/mL (02.25.25 @ 05:30)  Pro-Brain Natriuretic Peptide: 449 pg/mL (02.24.25 @ 20:56)  Lactate, Blood: 1.7 mmol/L (02.25.25 @ 05:30)  Troponin I, High Sensitivity Result: 1366.72 (02.25.25 @ 05:30)  Troponin I, High Sensitivity Result: 1535.63:(02.24.25 @ 20:56)    RADIOLOGY :    US Duplex  < from: US Duplex Venous Lower Ext Complete, Bilateral (02.24.25 @ 18:06) >  FINDINGS:    RIGHT:  Normal compressibility of the RIGHT common femoral, femoral and popliteal   veins.  Doppler examination shows normal spontaneous and phasic flow.  No RIGHT calf vein thrombosis is detected.    Subcutaneous edema in the right calf.    LEFT:  Normal compressibility of the LEFT common femoral, femoral and popliteal   veins.  Doppler examination shows normal spontaneous and phasic flow.  No LEFT calf vein thrombosis is detected.    IMPRESSION:  No evidence of deep venous thrombosis in either lower extremity.        < end of copied text >    CTA- chest  < from: CT Angio Chest PE Protocol w/ IV Cont (02.24.25 @ 16:32) >  IMPRESSION:    Bilateral pulmonary emboli involving all lobes, and associated right   heart strain. This was discussed with Dr. Novak at 4:49 PM on 2/4/2025,   with readback confirmation.    Increased size of nonspecific lymph node adjacent to the distal   esophagus. Recommend follow-up CT chest in 3-6 months.    --- End of Report ---    < end of copied text >  ECHO  < from: TTE W or WO Ultrasound Enhancing Agent (02.24.25 @ 18:02) >  CONCLUSIONS:      1. Technically difficult image quality.   2. Left ventricular systolic function is normal with an ejection fraction visually estimated at 50 to 55 %.   3. Mild left ventricular hypertrophy.   4. Severely enlarged right ventricular cavity size and reduced right ventricular systolic function. Tricuspid annular plane systolic excursion (TAPSE) is 1.3 cm (normal >=1.7 cm).   5. The right atrium is severely dilated.   6. Moderate tricuspid regurgitation.   7. Estimated pulmonary artery systolic pressure is 47 mmHg, consistent with mild to moderate pulmonary hypertension.    < end of copied text >        Assessment/ Plan  Although patient improved from HRNC to 5lpm NC, pt is still SOB, borderline hypotensive/ tachycardia, RV strain is severe. Pt is not candidate for inari , however given severe RV strain and hypotension pt is candidate for CDT.   -Continue CCU monitoring  -dual CDT   -Alteplase gtts ( 10mg/250ml rate 1mg/hr x 12 hrs) concurrent with heparin gtts ( patient specific)   -Strict bedrest for 17hrs - OOB 2/26/25 @04:00  -Site monitoring as per orders. Keep arm board in place until sheaths are removed.   -BW as per orders, ok to draw from green sheath  -Alteplase infusion stops 12 hours post initiation ( 23:00)  -Discontinue patient specific heparin gtts at 23:00  Begin Heparin gtts full AC protocol at 23:00 - no lapse in heparin infusion at all  -Pull both right upper arm venous sheath ( 5fr/6fr) at 01:00 pn 2/26/25 - 2 hours post alteplase infusion

## 2025-02-25 NOTE — PROGRESS NOTE ADULT - ASSESSMENT
Mr. Clark is a 71 y/o M,  with PMH CHF  , PAD, DM, HTN,  admitted with b/l PE with associated right heart strain, hypoxia, SOB, requiring NIV.   with significant clot burden , unprovoked    Acute Hypoxic respiratory failrue  BIlateral PEs  hx. chf    Plan:  Neuro: - Chronically on suboxone, continue home dose BID   CV: - Echo with RV strain, significant clot burden EF 55%, plan for Catheter Directed thrombolysis later today  Pulm:  - B/l PEs, hypoxic, requiring HFNC. titrated ton nasal cannula  GI - NPOfor procedure today  Renal: creatinine 0.8 this am  ID - no evidence of infection  Endo: - Hx diabetes, was on metformin, sliding scalre  DVT Prophylaxis IV heparin    remains in CCU at risk of respirtory deterioration   Mr. Clark is a 71 y/o M,  with PMH CHF  , PAD, DM, HTN,  admitted with b/l PE with associated right heart strain, hypoxia, SOB, requiring NIV.   with significant clot burden , unprovoked    Acute Hypoxic respiratory failrue  BIlateral PEs  hx. chf    Plan:  Neuro: - Chronically on suboxone, continue home dose BID   CV: - Echo with RV strain, significant clot burden EF 55%, plan for Catheter Directed thrombolysis later today  Pulm:  - B/l PEs, hypoxic, requiring HFNC. titrated ton nasal cannula  GI - NPOfor procedure today  Renal: creatinine 0.8 this am  ID - no evidence of infection  Endo: - Hx diabetes, was on metformin, sliding scalre  DVT Prophylaxis IV heparin    remains in CCU at risk of respirtory deterioration    10 :45 pm TPA d/c -d  At that time change heparin to normal AC  at 1 am D/C venous sheaths  and heparin to continue

## 2025-02-25 NOTE — PROVIDER CONTACT NOTE (OTHER) - SITUATION
NAVDEEP Díaz at bedside, who spoke to Dr. Alicia. Switched heparin gtt to full ACS nonogram due to apTT being too low. No bolus given.

## 2025-02-26 LAB
ALBUMIN SERPL ELPH-MCNC: 3.1 G/DL — LOW (ref 3.3–5)
ALP SERPL-CCNC: 68 U/L — SIGNIFICANT CHANGE UP (ref 40–120)
ALT FLD-CCNC: 28 U/L — SIGNIFICANT CHANGE UP (ref 12–78)
ANION GAP SERPL CALC-SCNC: 6 MMOL/L — SIGNIFICANT CHANGE UP (ref 5–17)
APTT BLD: 97.8 SEC — HIGH (ref 24.5–35.6)
AST SERPL-CCNC: 19 U/L — SIGNIFICANT CHANGE UP (ref 15–37)
BILIRUB SERPL-MCNC: 0.9 MG/DL — SIGNIFICANT CHANGE UP (ref 0.2–1.2)
BUN SERPL-MCNC: 20 MG/DL — SIGNIFICANT CHANGE UP (ref 7–23)
CALCIUM SERPL-MCNC: 8.9 MG/DL — SIGNIFICANT CHANGE UP (ref 8.5–10.1)
CHLORIDE SERPL-SCNC: 106 MMOL/L — SIGNIFICANT CHANGE UP (ref 96–108)
CO2 SERPL-SCNC: 26 MMOL/L — SIGNIFICANT CHANGE UP (ref 22–31)
CREAT SERPL-MCNC: 0.76 MG/DL — SIGNIFICANT CHANGE UP (ref 0.5–1.3)
EGFR: 97 ML/MIN/1.73M2 — SIGNIFICANT CHANGE UP
GLUCOSE BLDC GLUCOMTR-MCNC: 148 MG/DL — HIGH (ref 70–99)
GLUCOSE BLDC GLUCOMTR-MCNC: 166 MG/DL — HIGH (ref 70–99)
GLUCOSE BLDC GLUCOMTR-MCNC: 194 MG/DL — HIGH (ref 70–99)
GLUCOSE SERPL-MCNC: 165 MG/DL — HIGH (ref 70–99)
HCT VFR BLD CALC: 41.1 % — SIGNIFICANT CHANGE UP (ref 39–50)
HCT VFR BLD CALC: 42.8 % — SIGNIFICANT CHANGE UP (ref 39–50)
HGB BLD-MCNC: 13.3 G/DL — SIGNIFICANT CHANGE UP (ref 13–17)
HGB BLD-MCNC: 13.8 G/DL — SIGNIFICANT CHANGE UP (ref 13–17)
MAGNESIUM SERPL-MCNC: 2.2 MG/DL — SIGNIFICANT CHANGE UP (ref 1.6–2.6)
MCHC RBC-ENTMCNC: 27.9 PG — SIGNIFICANT CHANGE UP (ref 27–34)
MCHC RBC-ENTMCNC: 27.9 PG — SIGNIFICANT CHANGE UP (ref 27–34)
MCHC RBC-ENTMCNC: 32.2 G/DL — SIGNIFICANT CHANGE UP (ref 32–36)
MCHC RBC-ENTMCNC: 32.4 G/DL — SIGNIFICANT CHANGE UP (ref 32–36)
MCV RBC AUTO: 86.2 FL — SIGNIFICANT CHANGE UP (ref 80–100)
MCV RBC AUTO: 86.5 FL — SIGNIFICANT CHANGE UP (ref 80–100)
NRBC # BLD AUTO: 0 K/UL — SIGNIFICANT CHANGE UP (ref 0–0)
NRBC # BLD AUTO: 0 K/UL — SIGNIFICANT CHANGE UP (ref 0–0)
NRBC # FLD: 0 K/UL — SIGNIFICANT CHANGE UP (ref 0–0)
NRBC # FLD: 0 K/UL — SIGNIFICANT CHANGE UP (ref 0–0)
NRBC BLD AUTO-RTO: 0 /100 WBCS — SIGNIFICANT CHANGE UP (ref 0–0)
NRBC BLD AUTO-RTO: 0 /100 WBCS — SIGNIFICANT CHANGE UP (ref 0–0)
PHOSPHATE SERPL-MCNC: 3.2 MG/DL — SIGNIFICANT CHANGE UP (ref 2.5–4.5)
PLATELET # BLD AUTO: 231 K/UL — SIGNIFICANT CHANGE UP (ref 150–400)
PLATELET # BLD AUTO: 240 K/UL — SIGNIFICANT CHANGE UP (ref 150–400)
PMV BLD: 9.1 FL — SIGNIFICANT CHANGE UP (ref 7–13)
PMV BLD: 9.4 FL — SIGNIFICANT CHANGE UP (ref 7–13)
POTASSIUM SERPL-MCNC: 4.6 MMOL/L — SIGNIFICANT CHANGE UP (ref 3.5–5.3)
POTASSIUM SERPL-SCNC: 4.6 MMOL/L — SIGNIFICANT CHANGE UP (ref 3.5–5.3)
PROT SERPL-MCNC: 6.5 GM/DL — SIGNIFICANT CHANGE UP (ref 6–8.3)
RBC # BLD: 4.77 M/UL — SIGNIFICANT CHANGE UP (ref 4.2–5.8)
RBC # BLD: 4.95 M/UL — SIGNIFICANT CHANGE UP (ref 4.2–5.8)
RBC # FLD: 13.1 % — SIGNIFICANT CHANGE UP (ref 10.3–14.5)
RBC # FLD: 13.2 % — SIGNIFICANT CHANGE UP (ref 10.3–14.5)
SODIUM SERPL-SCNC: 138 MMOL/L — SIGNIFICANT CHANGE UP (ref 135–145)
WBC # BLD: 10.88 K/UL — HIGH (ref 3.8–10.5)
WBC # BLD: 12.83 K/UL — HIGH (ref 3.8–10.5)
WBC # FLD AUTO: 10.88 K/UL — HIGH (ref 3.8–10.5)
WBC # FLD AUTO: 12.83 K/UL — HIGH (ref 3.8–10.5)

## 2025-02-26 PROCEDURE — 99291 CRITICAL CARE FIRST HOUR: CPT

## 2025-02-26 PROCEDURE — 99292 CRITICAL CARE ADDL 30 MIN: CPT

## 2025-02-26 PROCEDURE — 99233 SBSQ HOSP IP/OBS HIGH 50: CPT

## 2025-02-26 RX ORDER — DEXTROAMPHETAMINE SACCHARATE, AMPHETAMINE ASPARTATE MONOHYDRATE, DEXTROAMPHETAMINE SULFATE AND AMPHETAMINE SULFATE 2.5; 2.5; 2.5; 2.5 MG/1; MG/1; MG/1; MG/1
20 CAPSULE, EXTENDED RELEASE ORAL THREE TIMES A DAY
Refills: 0 | Status: DISCONTINUED | OUTPATIENT
Start: 2025-02-26 | End: 2025-02-28

## 2025-02-26 RX ORDER — FUROSEMIDE 10 MG/ML
20 INJECTION INTRAMUSCULAR; INTRAVENOUS ONCE
Refills: 0 | Status: COMPLETED | OUTPATIENT
Start: 2025-02-26 | End: 2025-02-26

## 2025-02-26 RX ORDER — INSULIN LISPRO 100 U/ML
INJECTION, SOLUTION INTRAVENOUS; SUBCUTANEOUS
Refills: 0 | Status: DISCONTINUED | OUTPATIENT
Start: 2025-02-26 | End: 2025-02-28

## 2025-02-26 RX ORDER — APIXABAN 2.5 MG/1
10 TABLET, FILM COATED ORAL
Refills: 0 | Status: DISCONTINUED | OUTPATIENT
Start: 2025-02-26 | End: 2025-02-28

## 2025-02-26 RX ADMIN — BUPRENORPHINE 8 MILLIGRAM(S): 7.5 PATCH, EXTENDED RELEASE TRANSDERMAL at 21:48

## 2025-02-26 RX ADMIN — HEPARIN SODIUM 2300 UNIT(S)/HR: 1000 INJECTION INTRAVENOUS; SUBCUTANEOUS at 06:55

## 2025-02-26 RX ADMIN — APIXABAN 10 MILLIGRAM(S): 2.5 TABLET, FILM COATED ORAL at 10:15

## 2025-02-26 RX ADMIN — HEPARIN SODIUM 2300 UNIT(S)/HR: 1000 INJECTION INTRAVENOUS; SUBCUTANEOUS at 02:27

## 2025-02-26 RX ADMIN — DEXTROAMPHETAMINE SACCHARATE, AMPHETAMINE ASPARTATE MONOHYDRATE, DEXTROAMPHETAMINE SULFATE AND AMPHETAMINE SULFATE 20 MILLIGRAM(S): 2.5; 2.5; 2.5; 2.5 CAPSULE, EXTENDED RELEASE ORAL at 21:48

## 2025-02-26 RX ADMIN — BUPRENORPHINE 8 MILLIGRAM(S): 7.5 PATCH, EXTENDED RELEASE TRANSDERMAL at 09:36

## 2025-02-26 RX ADMIN — INSULIN LISPRO 1: 100 INJECTION, SOLUTION INTRAVENOUS; SUBCUTANEOUS at 12:27

## 2025-02-26 RX ADMIN — BUPRENORPHINE 8 MILLIGRAM(S): 7.5 PATCH, EXTENDED RELEASE TRANSDERMAL at 10:20

## 2025-02-26 RX ADMIN — APIXABAN 10 MILLIGRAM(S): 2.5 TABLET, FILM COATED ORAL at 21:48

## 2025-02-26 RX ADMIN — Medication 40 MILLIGRAM(S): at 09:37

## 2025-02-26 RX ADMIN — INSULIN LISPRO 1: 100 INJECTION, SOLUTION INTRAVENOUS; SUBCUTANEOUS at 17:31

## 2025-02-26 RX ADMIN — ATORVASTATIN CALCIUM 40 MILLIGRAM(S): 80 TABLET, FILM COATED ORAL at 21:48

## 2025-02-26 RX ADMIN — Medication 81 MILLIGRAM(S): at 09:36

## 2025-02-26 RX ADMIN — FUROSEMIDE 20 MILLIGRAM(S): 10 INJECTION INTRAMUSCULAR; INTRAVENOUS at 09:37

## 2025-02-26 RX ADMIN — DEXTROAMPHETAMINE SACCHARATE, AMPHETAMINE ASPARTATE MONOHYDRATE, DEXTROAMPHETAMINE SULFATE AND AMPHETAMINE SULFATE 20 MILLIGRAM(S): 2.5; 2.5; 2.5; 2.5 CAPSULE, EXTENDED RELEASE ORAL at 14:05

## 2025-02-26 NOTE — DIETITIAN INITIAL EVALUATION ADULT - ADD RECOMMEND
1) C/w Consistent Carbohydrate   2) Obtain vitamin D 25OH level to assess nutriture  3) Please obtain daily weights  4) MVI w/ minerals daily to ensure 100% RDA met  5) Monitor and optimize BG levels between 140-180 mg/dL by medical management   6) Encourage protein-rich foods, maximize food preferences  7) Monitor bowel movements, if no BM for >3 days, consider implementing bowel regimen.  8) Confirm goals of care regarding nutrition support  RD will continue to monitor PO intake, labs, hydration, and wt prn.

## 2025-02-26 NOTE — DIETITIAN INITIAL EVALUATION ADULT - PERTINENT MEDS FT
MEDICATIONS  (STANDING):  aspirin  chewable 81 milliGRAM(s) Oral daily  atorvastatin 40 milliGRAM(s) Oral at bedtime  buprenorphine 8 milliGRAM(s) SubLingual <User Schedule>  dextrose 5%. 1000 milliLiter(s) (100 mL/Hr) IV Continuous <Continuous>  dextrose 5%. 1000 milliLiter(s) (50 mL/Hr) IV Continuous <Continuous>  dextrose 50% Injectable 25 Gram(s) IV Push once  dextrose 50% Injectable 12.5 Gram(s) IV Push once  dextrose 50% Injectable 25 Gram(s) IV Push once  furosemide   Injectable 20 milliGRAM(s) IV Push once  glucagon  Injectable 1 milliGRAM(s) IntraMuscular once  heparin  Infusion.  Unit(s)/Hr (23 mL/Hr) IV Continuous <Continuous>  insulin lispro (ADMELOG) corrective regimen sliding scale   SubCutaneous three times a day before meals  pantoprazole    Tablet 40 milliGRAM(s) Oral before breakfast  sodium chloride 0.9%. 1000 milliLiter(s) (75 mL/Hr) IV Continuous <Continuous>    MEDICATIONS  (PRN):  dextrose Oral Gel 15 Gram(s) Oral once PRN Blood Glucose LESS THAN 70 milliGRAM(s)/deciliter  heparin   Injectable 95681 Unit(s) IV Push every 6 hours PRN For aPTT less than 40  heparin   Injectable 5000 Unit(s) IV Push every 6 hours PRN For aPTT between 40 - 57   MEDICATIONS  (STANDING):  aspirin  chewable 81 milliGRAM(s) Oral daily  atorvastatin 40 milliGRAM(s) Oral at bedtime  buprenorphine 8 milliGRAM(s) SubLingual <User Schedule>  dextrose 5%. 1000 milliLiter(s) (100 mL/Hr) IV Continuous <Continuous>  dextrose 5%. 1000 milliLiter(s) (50 mL/Hr) IV Continuous <Continuous>  dextrose 50% Injectable 25 Gram(s) IV Push once  dextrose 50% Injectable 12.5 Gram(s) IV Push once  dextrose 50% Injectable 25 Gram(s) IV Push once  furosemide   Injectable 20 milliGRAM(s) IV Push once  glucagon  Injectable 1 milliGRAM(s) IntraMuscular once  heparin  Infusion.  Unit(s)/Hr (23 mL/Hr) IV Continuous <Continuous>  insulin lispro (ADMELOG) corrective regimen sliding scale   SubCutaneous three times a day before meals  pantoprazole    Tablet 40 milliGRAM(s) Oral before breakfast  sodium chloride 0.9%. 1000 milliLiter(s) (75 mL/Hr) IV Continuous <Continuous>    MEDICATIONS  (PRN):  dextrose Oral Gel 15 Gram(s) Oral once PRN Blood Glucose LESS THAN 70 milliGRAM(s)/deciliter  heparin   Injectable 71361 Unit(s) IV Push every 6 hours PRN For aPTT less than 40  heparin   Injectable 5000 Unit(s) IV Push every 6 hours PRN For aPTT between 40 - 57    Home Medications:  amLODIPine 5 mg oral tablet: 1 tab(s) orally once a day (24 Feb 2025 18:08)  aspirin 81 mg oral tablet, chewable: 1 tab(s) orally once a day (24 Feb 2025 18:09)  buprenorphine 8 mg sublingual tablet: 1 tab(s) sublingually 2 times a day (24 Feb 2025 18:08)  dextroamphetamine-amphetamine 20 mg oral tablet: 1 tab(s) orally 3 times a day (24 Feb 2025 18:08)  escitalopram 20 mg oral tablet: 1 tab(s) orally 2 times a day last filled for 90 day supply in October 2024, pt states that he is OUT OF SUPPLY at home (24 Feb 2025 18:09)  isosorbide mononitrate 30 mg oral tablet, extended release: 1 tab(s) orally once a day last filled for 90 day supply in July 2024, pt states that he still has supply and takes daily (24 Feb 2025 18:09)  lisinopril 2.5 mg oral tablet: 1 tab(s) orally once a day (24 Feb 2025 18:09)  metFORMIN 850 mg oral tablet: 1 tab(s) orally 2 times a day (24 Feb 2025 18:09)  metoprolol succinate 25 mg oral tablet, extended release: 1 tab(s) orally 2 times a day (24 Feb 2025 18:09)  pantoprazole 40 mg oral delayed release tablet: 1 tab(s) orally once a day last filled for 90 day supply in August 2024, pt states that he still has supply and takes daily (24 Feb 2025 18:09)  pregabalin 50 mg oral capsule: 1 cap(s) orally 2 times a day (24 Feb 2025 18:09)  Vitamin D3 25 mcg (1000 intl units) oral capsule: 1 cap(s) orally once a day (24 Feb 2025 18:09)

## 2025-02-26 NOTE — DIETITIAN INITIAL EVALUATION ADULT - OTHER INFO
69 y/o M who is a poor historian with a PMHx of CHF with unknown LVEF, anxiety, bipolar disorder, PAD, DM, HTN, prior OUD (reportedly last use 20 years ago, on suboxone 8mg/2mg BID) who presented to the ED BIBEMS 2/24 from home for shortness of breath, generalized weakness, diaphoresis, and dizziness that started on the day of presentation. Patient reports that he woke from sleep at 1300, and shortly after had sudden onset of SOB, lightheadedness, and diaphoresis. Endorses recent b/l ankle swelling. Admitted for BL PE with associated right heart strain, hypoxia, and SOB; admitted to CCU. 69 y/o M who is a poor historian with a PMHx of CHF with unknown LVEF, anxiety, bipolar disorder, PAD, DM, HTN, prior OUD (reportedly last use 20 years ago, on suboxone 8mg/2mg BID) who presented to the ED BIBEMS 2/24 from home for shortness of breath, generalized weakness, diaphoresis, and dizziness that started on the day of presentation. Patient reports that he woke from sleep at 1300, and shortly after had sudden onset of SOB, lightheadedness, and diaphoresis. Endorses recent b/l ankle swelling. Admitted for BL PE with associated right heart strain, hypoxia, and SOB; admitted to CCU. S/p catheter directed thrombolysis (2/25).    Breakfast just delivered at time of visit; reports excellent appetite/ intake since admission. Endorses a stable UBW of 290# at home. RD unable to obtain bedscale wt as bedscale not working. Admit weight of 286# taken by RN; 2+ and 3+ edema may be skewing weight. No weight hx to review. NFPE reveals no muscle/ fat wasting, pt does not meet criteria for PCM at this time. Pt overweight/ obese appearing and body habitus and moderate to severe edema may be masking any wasting. HgbA1c level 7.9% indicating okay glycemic control pta. C/w Consistent Carbohydrate diet. Pt declined need for ONS at this time. Please see additional recommendations below.

## 2025-02-26 NOTE — PROGRESS NOTE ADULT - ASSESSMENT
Mr. Clark is a 71 y/o M,  with PMH CHF  , PAD, DM, HTN,  admitted with b/l PE with associated right heart strain, hypoxia, SOB, requiring NIV.   with significant clot burden , unprovoked  underwent catheter directed thrombolysis  Acute Hypoxic respiratory failrue  BIlateral PEs  hx. chf    Plan:  Neuro: - Chronically on suboxone, continue home dose BID   CV: - Echo with RV strain, significant clot burden EF 55%,  s/p thrombolysis, rv dysfunction will give dose lasix  Pulm:  - B/l PEs, hypoxic, required HFNC. titrated to  nasal cannula  GI - Diet as tolerated  Renal: creatinine 0.8 this am  ID - no evidence of infection  Endo: - Hx diabetes, was on metformin, sliding scale  DVT Prophylaxis IV heparin  needs Physical therapy out of bed today    remains in CCU at risk of respirtory deterioration    10 :45 pm TPA d/c -d  At that time change heparin to normal AC  at 1 am D/C venous sheaths  and heparin to continue

## 2025-02-26 NOTE — PROGRESS NOTE ADULT - SUBJECTIVE AND OBJECTIVE BOX
[facility] Vascular Cardiology Team   [facility]/ [facility] Faculty Practice   Office [address] [facility] 1st Floor   [facility] [address]  Telephone; [phone] Fax: [phone]      Mr. Clark is a 71 y/o M, poor historian, with PMH CHF with unknown LVEF, anxiety, bipolar disorder, PAD, DM, HTN, prior OUD (reportedly last use 20 years ago, on suboxone 8mg/2mg BID) who presents to the ED BIBEMS 2/24 from home for shortness of breath, generalized weakness, diaphoresis, and dizziness that started on the day of presentation. Patient reports that he woke from sleep at 1300, and shortly after had sudden onset of SOB, lightheadedness, and diaphoresis. Patient denies recent surgery/recent travel, history of VTE, but endorses recent b/l ankle swelling.     Patient found to have b/l PE with associated RV strain. ICU was consulted.  (24 Feb 2025 17:46)    Subjective/Observations: Pt. seen and examined and evaluated. Pt. resting comfortably in bed in NAD, with no respiratory distress, no chest pain, dyspnea, palpitations, PND, or orthopnea.    REVIEW OF SYSTEMS: All other review of systems is negative unless indicated above    PAST MEDICAL & SURGICAL HISTORY:  Poor historian  HTN (hypertension)  PAD (peripheral artery disease)  Bipolar disorder  Anxiety  CHF with unknown LVEF  H/O hernia repair  H/O knee surgery    MEDICATIONS  (STANDING):  apixaban 10 milliGRAM(s) Oral two times a day  aspirin  chewable 81 milliGRAM(s) Oral daily  atorvastatin 40 milliGRAM(s) Oral at bedtime  buprenorphine 8 milliGRAM(s) SubLingual <User Schedule>  dextrose 5%. 1000 milliLiter(s) (100 mL/Hr) IV Continuous <Continuous>  dextrose 5%. 1000 milliLiter(s) (50 mL/Hr) IV Continuous <Continuous>  dextrose 50% Injectable 25 Gram(s) IV Push once  dextrose 50% Injectable 12.5 Gram(s) IV Push once  dextrose 50% Injectable 25 Gram(s) IV Push once  glucagon  Injectable 1 milliGRAM(s) IntraMuscular once  insulin lispro (ADMELOG) corrective regimen sliding scale   SubCutaneous three times a day before meals  pantoprazole    Tablet 40 milliGRAM(s) Oral before breakfast    MEDICATIONS  (PRN):  dextrose Oral Gel 15 Gram(s) Oral once PRN Blood Glucose LESS THAN 70 milliGRAM(s)/deciliter  heparin   Injectable 49532 Unit(s) IV Push every 6 hours PRN For aPTT less than 40  heparin   Injectable 5000 Unit(s) IV Push every 6 hours PRN For aPTT between 40 - 57      Allergies: No Known Allergies    Vital Signs Last 24 Hrs  T(C): 37.1 (26 Feb 2025 06:36), Max: 37.1 (26 Feb 2025 00:58)  T(F): 98.8 (26 Feb 2025 06:36), Max: 98.8 (26 Feb 2025 00:58)  HR: 93 (26 Feb 2025 09:00) (74 - 101)  BP: 124/84 (26 Feb 2025 09:00) (93/58 - 137/93)  BP(mean): 95 (26 Feb 2025 09:00) (68 - 116)  RR: 12 (26 Feb 2025 09:00) (2 - 24)  SpO2: 100% (26 Feb 2025 09:00) (92% - 100%)      LABS: All Labs Reviewed:                        13.8   10.88 )-----------( 231      ( 26 Feb 2025 08:33 )             42.8                     26 Feb 2025 08:33    138    |  106    |  20     ----------------------------<  165    4.6     |  26     |  0.76   Ca    8.9        26 Feb 2025 08:33  Phos  3.2       26 Feb 2025 08:33  Mg     2.2       26 Feb 2025 08:33    TPro  6.5    /  Alb  3.1    /  TBili  0.9    /  DBili  x      /  AST  19     /  ALT  28     /  AlkPhos  68     26 Feb 2025 08:33  PTT - ( 26 Feb 2025 01:33 )  PTT:97.8 sec    Echo:  < from: TTE W or WO Ultrasound Enhancing Agent (02.24.25 @ 18:02) >  CONCLUSIONS:      1. Technically difficult image quality.   2. Left ventricular systolic function is normal with an ejection fraction visually estimated at 50 to 55 %.   3. Mild left ventricular hypertrophy.   4. Severely enlarged right ventricular cavity size and reduced right ventricular systolic function. Tricuspid annular plane systolic excursion (TAPSE) is 1.3 cm (normal >=1.7 cm).   5. The right atrium is severely dilated.   6. Moderate tricuspid regurgitation.   7. Estimated pulmonary artery systolic pressure is 47 mmHg, consistent with mild to moderate pulmonary hypertension.      Cath:  < from: US Duplex Venous Lower Ext Complete, Bilateral (02.24.25 @ 18:06) >  RIGHT:  Normal compressibility of the RIGHT common femoral, femoral and popliteal   veins.  Doppler examination shows normal spontaneous and phasic flow.  No RIGHT calf vein thrombosis is detected.    Subcutaneous edema in the right calf.    LEFT:  Normal compressibility of the LEFT common femoral, femoral and popliteal   veins.  Doppler examination shows normal spontaneous and phasic flow.  No LEFT calf vein thrombosis is detected.    IMPRESSION:  No evidence of deep venous thrombosis in either lower extremity.      Physical Exam:  Appearance: [ ] Normal  [ ] abnormal [X ] NAD   Eyes: [ ] PERRL [ ] EOMI  HEENT: [ ] Normal [ ] Abnormal oral mucosa [ ]NC/AT  Cardiovascular: [X ] S1 [X ] S2 [ ] RRR [[X ]edema B/L LE edema   Procedural Access Site: [X ] Rt. brachial site benign soft no bleeding no hematoma +1 brachial pulse     Respiratory: [X ] Clear to auscultation bilaterally  Gastrointestinal: [X ] Soft [ ] tenderness[ ] distension [ ] BS  Psychiatry: [X ] AAOx3  [ ] confused [ ] disoriented [ ] Mood & affect appropriate  Skin: [ ]  rashes [ ] ecchymoses [ ] cyanosis

## 2025-02-26 NOTE — PROGRESS NOTE ADULT - SUBJECTIVE AND OBJECTIVE BOX
Interval History:         Patient s/p Catheter Directed thrombolysis yesterday, on  4liters nasal cannula         Sheaths out last night ,          feels less short of breathe         BP in 90s, HR 90    HPI:         70M with PMHx CHF, , PAD, HTN, prior substance abuse who presented with SOB, diaphoresis, and dizziness with sudden onset. Workup revealed extensive bilateral PE with RV strain,   biomarkers. . No hypotension noted. Initially placed on HFNC/NRB for hypoxia. Not a candidate for urgent cath direct thrombectomy given distal nature of clots. Started on IV AC and admitted to CCU.   afebrile,   Has bilateral lower extremity dopplers negative for dvt, has chornic lower abdominal swelling  Echo ef 55% with rv strain and moderate pulmonary htn  underwent catheter directed thrombolysis on 1/25    ROS no recent travel, no chest pain, sob, no abdominal pain, no fevers, no chills       no hx. of clots, hx. PVD, no recent travel     MEDICATIONS  (STANDING):  alteplase    Infusion 1 mG/Hr (25 mL/Hr) IntraCatheter.. <Continuous>  alteplase    Infusion 1 mG/Hr (25 mL/Hr) IntraCatheter.. <Continuous>  aspirin  chewable 81 milliGRAM(s) Oral daily  atorvastatin 40 milliGRAM(s) Oral at bedtime  buprenorphine 8 milliGRAM(s) SubLingual <User Schedule>  dextrose 5%. 1000 milliLiter(s) (100 mL/Hr) IV Continuous <Continuous>  dextrose 5%. 1000 milliLiter(s) (50 mL/Hr) IV Continuous <Continuous>  dextrose 50% Injectable 25 Gram(s) IV Push once  dextrose 50% Injectable 12.5 Gram(s) IV Push once  dextrose 50% Injectable 25 Gram(s) IV Push once  glucagon  Injectable 1 milliGRAM(s) IntraMuscular once  heparin  Infusion.  Unit(s)/Hr (23 mL/Hr) IV Continuous <Continuous>  insulin lispro (ADMELOG) corrective regimen sliding scale   SubCutaneous three times a day before meals  pantoprazole    Tablet 40 milliGRAM(s) Oral before breakfast  sodium chloride 0.9%. 1000 milliLiter(s) (75 mL/Hr) IV Continuous <Continuous>    MEDICATIONS  (PRN):  dextrose Oral Gel 15 Gram(s) Oral once PRN Blood Glucose LESS THAN 70 milliGRAM(s)/deciliter  heparin   Injectable 10125 Unit(s) IV Push every 6 hours PRN For aPTT less than 40  heparin   Injectable 5000 Unit(s) IV Push every 6 hours PRN For aPTT between 40 - 57    ICU Vital Signs Last 24 Hrs  T(C): 37.1 (26 Feb 2025 06:36), Max: 37.1 (25 Feb 2025 09:14)  T(F): 98.8 (26 Feb 2025 06:36), Max: 98.8 (26 Feb 2025 00:58)  HR: 88 (26 Feb 2025 06:00) (74 - 102)  BP: 110/92 (26 Feb 2025 06:00) (93/58 - 137/93)  BP(mean): 99 (26 Feb 2025 06:00) (68 - 116)  ABP: --  ABP(mean): --  RR: 10 (26 Feb 2025 06:00) (2 - 24)  SpO2: 94% (26 Feb 2025 06:00) (92% - 100%)    O2 Parameters below as of 26 Feb 2025 06:00  Patient On (Oxygen Delivery Method): nasal cannula  O2 Flow (L/min): 4    Physical Exam    General - no distress  HEENT - nc/at  neck supple  lungs - dec bs at bases  cv rrr  abdomen soft, non tender  gu voiding  extremtieis pittlin edema bilateral  neuro awake and alert    I&O's Summary    25 Feb 2025 07:01  -  26 Feb 2025 07:00  --------------------------------------------------------  IN: 880 mL / OUT: 1750 mL / NET: -870 mL                        13.3   12.83 )-----------( 240      ( 26 Feb 2025 01:33 )             41.1       02-25    135  |  104  |  24[H]  ----------------------------<  177[H]  4.5   |  26  |  0.82    Ca    9.4      25 Feb 2025 05:30  Phos  3.9     02-25  Mg     2.3     02-25    TPro  6.8  /  Alb  3.3  /  TBili  0.7  /  DBili  x   /  AST  21  /  ALT  36  /  AlkPhos  77  02-25    Urinalysis Basic - ( 25 Feb 2025 05:30 )    Color: x / Appearance: x / SG: x / pH: x  Gluc: 177 mg/dL / Ketone: x  / Bili: x / Urobili: x   Blood: x / Protein: x / Nitrite: x   Leuk Esterase: x / RBC: x / WBC x   Sq Epi: x / Non Sq Epi: x / Bacteria: x    DVT Prophylaxis:  IV Heparin                                                               Advanced Directives: Full Code     Sepsis Criteria Met - No    Labs, Notes, Orders, radiologic studies reviewed and care coordinated with multidisciplinary team

## 2025-02-26 NOTE — DIETITIAN INITIAL EVALUATION ADULT - SIGNS/SYMPTOMS
Podiatric Surgery - Clinic Note  Leatha Leal : 1945 Sex: female MRN: [id number] 2017 12:25 PM    CHIEF COMPLAINT:  Chief Complaint   Patient presents with   • Routine Foot Care     nail care   • Other     Dhariwal / - per pt       SUBJECTIVE:   Leatha Leal is a 72 year old female who presents to the clinic today complaining of long, thickened and painful toenails which are difficult to debride by the patient. Patient relates pain has been present for several months duration. The patient denies any nausea, vomiting, fever, chills or calf pain. Patient has diabetes but denies numbness, tingling, or burning sensations in her feet. She does have MS and does not ambulate. She is in an electric wheelchair at all times.    Limitation of ambulation: no  Pain on toenails: yes        PHYSICAL EXAMINATION:   Gen: no apparent distress, pleasant    INTEGUMENT: Nails are long, thickened, discolored, dystrophic, and friable with foul smelling subungual debris 1-5 bilaterally and pain on palpation bilaterally.   Skin is somewhat cool, mildly dry, and somewhat atrophic.   No current open lesions or signs of infection.   Pedal hair is Absent.     Vasc: DP 1/4 PT, 1/4 b/l.   Edema present bilaterally .   CFT < 3 seconds bilaterally     Neuro: normal sensation to light touch and painful stimuli as well as [place] monofilament bilaterally.     M/S: dorsally contracted digits 2-5 jaret.. Pain at all nails on palpation bilaterally .     ASSESSMENT:   Onychomycosis   Pain in limb   Hammer toes 2-5 jaret  MS    PLAN:   All nails 1-5 jaret were debrided sharply and mechanically as medically necessary with nail nippers and rotary diomedes to a level safe and comfortable for the patient to include all necrotic nail down to the nailbed.    I discussed in detail with the patient the condition as well as treatment options.   I did discuss proper foot care and hygiene with the patient.   Advised patient on use of  emollients.   I did advise the patient to continue to monitor for signs of infection and return to clinic in 3 months or sooner if problems should arise.         Primary care physician:   Harish Mccrary DPM               AEB BMI 38.7

## 2025-02-26 NOTE — CHART NOTE - NSCHARTNOTEFT_GEN_A_CORE
Brachial sheaths removed with sutures, pressure applied with minimal oozing from site and pressure bandage applied. Patient tolerated without incidence.

## 2025-02-26 NOTE — DIETITIAN INITIAL EVALUATION ADULT - NSFNSGIIOFT_GEN_A_CORE
I&O's Detail    25 Feb 2025 07:01  -  26 Feb 2025 07:00  --------------------------------------------------------  IN:    Alteplase: 250 mL    Alteplase: 250 mL    Heparin: 80 mL    sodium chloride 0.9%: 300 mL  Total IN: 880 mL    OUT:    Voided (mL): 1750 mL  Total OUT: 1750 mL    Total NET: -870 mL

## 2025-02-26 NOTE — PROGRESS NOTE ADULT - CRITICAL CARE ATTENDING COMMENT
Agree with above. Patient to remain with CDT catheters infusion x 12 hours with removal of catheters at that time. 1-2 hours post venous sheaths to be removed. Heparin should be continued at all times.    Thank you for allowing me to participate in the care of your patient. Feel free to contact me if any clinical issues arise via contact information below or MS Teams.    Hari Alicia MD, FAC, [facility]   Director, Interventional Cardiology, [address], [facility] Floor, [address] Office: [phone]  [place] Office: [phone]  Email: [email or web address]
Catheter directed lysis yesterday which patient tolerated well. Plan for ambulation trial today with transition to DOAC if no issues. Patient improving substantially.    Thank you for allowing me to participate in the care of your patient. Feel free to contact me if any clinical issues arise via contact information below or MS Teams.    Hari Alicia MD, FACC, [facility]   Director, Interventional Cardiology, [address], [address] [place] [address], [address] Office: [phone]  [place] Office: [phone]  Email: [email or web address]

## 2025-02-26 NOTE — DIETITIAN INITIAL EVALUATION ADULT - PERTINENT LABORATORY DATA
02-25    135  |  104  |  24[H]  ----------------------------<  177[H]  4.5   |  26  |  0.82    Ca    9.4      25 Feb 2025 05:30  Phos  3.9     02-25  Mg     2.3     02-25    TPro  6.8  /  Alb  3.3  /  TBili  0.7  /  DBili  x   /  AST  21  /  ALT  36  /  AlkPhos  77  02-25  POCT Blood Glucose.: 148 mg/dL (02-26-25 @ 07:52)  A1C with Estimated Average Glucose Result: 7.9 % (02-25-25 @ 05:30)   02-25    135  |  104  |  24[H]  ----------------------------<  177[H]  4.5   |  26  |  0.82    Ca    9.4      25 Feb 2025 05:30  Phos  3.9     02-25  Mg     2.3     02-25    TPro  6.8  /  Alb  3.3  /  TBili  0.7  /  DBili  x   /  AST  21  /  ALT  36  /  AlkPhos  77  02-25  POCT Blood Glucose.: 148 mg/dL (02-26-25 @ 07:52)  A1C with Estimated Average Glucose Result: 7.9 % (02-25-25 @ 05:30)

## 2025-02-26 NOTE — PROGRESS NOTE ADULT - ASSESSMENT
69 y/o M, poor historian, with PMH CHF with unknown LVEF, anxiety, bipolar disorder, PAD, DM, HTN, prior OUD (reportedly last use 20 years ago, on suboxone 8mg/2mg BID) who presents to the ED BIBEMS 2/24 from home for shortness of breath, generalized weakness, diaphoresis, and dizziness that started on the day of presentation. S/P CTA revealing b/l PE, RHS on ECHO  No evidence of deep venous thrombosis in either lower extremity.  S/P CDT (cathter directed thrombolysis)   Pt. on 2L NC pulse ox 97% reports no SOB    -Have patient ambulate today off oxygen   -Properly document if patient is using supplement oxygen or not  -Tansition to appropriate oral anti coagulation   -Continue CCU monitoring   -Continue to closely monitor for signs of decompensation   -Plan of care discussed with patient, RN, primary team and vascular cardiology attending

## 2025-02-27 ENCOUNTER — TRANSCRIPTION ENCOUNTER (OUTPATIENT)
Age: 70
End: 2025-02-27

## 2025-02-27 LAB
ALBUMIN SERPL ELPH-MCNC: 3.4 G/DL — SIGNIFICANT CHANGE UP (ref 3.3–5)
ALP SERPL-CCNC: 74 U/L — SIGNIFICANT CHANGE UP (ref 40–120)
ALT FLD-CCNC: 23 U/L — SIGNIFICANT CHANGE UP (ref 12–78)
ANION GAP SERPL CALC-SCNC: 9 MMOL/L — SIGNIFICANT CHANGE UP (ref 5–17)
APTT BLD: 35.3 SEC — SIGNIFICANT CHANGE UP (ref 24.5–35.6)
AST SERPL-CCNC: 14 U/L — LOW (ref 15–37)
BILIRUB SERPL-MCNC: 1.2 MG/DL — SIGNIFICANT CHANGE UP (ref 0.2–1.2)
BUN SERPL-MCNC: 20 MG/DL — SIGNIFICANT CHANGE UP (ref 7–23)
CALCIUM SERPL-MCNC: 9.7 MG/DL — SIGNIFICANT CHANGE UP (ref 8.5–10.1)
CHLORIDE SERPL-SCNC: 103 MMOL/L — SIGNIFICANT CHANGE UP (ref 96–108)
CO2 SERPL-SCNC: 24 MMOL/L — SIGNIFICANT CHANGE UP (ref 22–31)
CREAT SERPL-MCNC: 0.79 MG/DL — SIGNIFICANT CHANGE UP (ref 0.5–1.3)
EGFR: 96 ML/MIN/1.73M2 — SIGNIFICANT CHANGE UP
GLUCOSE BLDC GLUCOMTR-MCNC: 149 MG/DL — HIGH (ref 70–99)
GLUCOSE BLDC GLUCOMTR-MCNC: 204 MG/DL — HIGH (ref 70–99)
GLUCOSE BLDC GLUCOMTR-MCNC: 213 MG/DL — HIGH (ref 70–99)
GLUCOSE BLDC GLUCOMTR-MCNC: 233 MG/DL — HIGH (ref 70–99)
GLUCOSE SERPL-MCNC: 156 MG/DL — HIGH (ref 70–99)
HCT VFR BLD CALC: 44.1 % — SIGNIFICANT CHANGE UP (ref 39–50)
HGB BLD-MCNC: 14.6 G/DL — SIGNIFICANT CHANGE UP (ref 13–17)
MCHC RBC-ENTMCNC: 28.2 PG — SIGNIFICANT CHANGE UP (ref 27–34)
MCHC RBC-ENTMCNC: 33.1 G/DL — SIGNIFICANT CHANGE UP (ref 32–36)
MCV RBC AUTO: 85.3 FL — SIGNIFICANT CHANGE UP (ref 80–100)
NRBC # BLD AUTO: 0 K/UL — SIGNIFICANT CHANGE UP (ref 0–0)
NRBC # FLD: 0 K/UL — SIGNIFICANT CHANGE UP (ref 0–0)
NRBC BLD AUTO-RTO: 0 /100 WBCS — SIGNIFICANT CHANGE UP (ref 0–0)
PLATELET # BLD AUTO: 272 K/UL — SIGNIFICANT CHANGE UP (ref 150–400)
PMV BLD: 9.4 FL — SIGNIFICANT CHANGE UP (ref 7–13)
POTASSIUM SERPL-MCNC: 3.9 MMOL/L — SIGNIFICANT CHANGE UP (ref 3.5–5.3)
POTASSIUM SERPL-SCNC: 3.9 MMOL/L — SIGNIFICANT CHANGE UP (ref 3.5–5.3)
PROT SERPL-MCNC: 7.3 GM/DL — SIGNIFICANT CHANGE UP (ref 6–8.3)
RBC # BLD: 5.17 M/UL — SIGNIFICANT CHANGE UP (ref 4.2–5.8)
RBC # FLD: 12.9 % — SIGNIFICANT CHANGE UP (ref 10.3–14.5)
SODIUM SERPL-SCNC: 136 MMOL/L — SIGNIFICANT CHANGE UP (ref 135–145)
WBC # BLD: 12.4 K/UL — HIGH (ref 3.8–10.5)
WBC # FLD AUTO: 12.4 K/UL — HIGH (ref 3.8–10.5)

## 2025-02-27 PROCEDURE — 99233 SBSQ HOSP IP/OBS HIGH 50: CPT

## 2025-02-27 RX ORDER — FUROSEMIDE 10 MG/ML
20 INJECTION INTRAMUSCULAR; INTRAVENOUS ONCE
Refills: 0 | Status: COMPLETED | OUTPATIENT
Start: 2025-02-27 | End: 2025-02-27

## 2025-02-27 RX ADMIN — BUPRENORPHINE 8 MILLIGRAM(S): 7.5 PATCH, EXTENDED RELEASE TRANSDERMAL at 11:15

## 2025-02-27 RX ADMIN — DEXTROAMPHETAMINE SACCHARATE, AMPHETAMINE ASPARTATE MONOHYDRATE, DEXTROAMPHETAMINE SULFATE AND AMPHETAMINE SULFATE 20 MILLIGRAM(S): 2.5; 2.5; 2.5; 2.5 CAPSULE, EXTENDED RELEASE ORAL at 21:28

## 2025-02-27 RX ADMIN — DEXTROAMPHETAMINE SACCHARATE, AMPHETAMINE ASPARTATE MONOHYDRATE, DEXTROAMPHETAMINE SULFATE AND AMPHETAMINE SULFATE 20 MILLIGRAM(S): 2.5; 2.5; 2.5; 2.5 CAPSULE, EXTENDED RELEASE ORAL at 13:14

## 2025-02-27 RX ADMIN — DEXTROAMPHETAMINE SACCHARATE, AMPHETAMINE ASPARTATE MONOHYDRATE, DEXTROAMPHETAMINE SULFATE AND AMPHETAMINE SULFATE 20 MILLIGRAM(S): 2.5; 2.5; 2.5; 2.5 CAPSULE, EXTENDED RELEASE ORAL at 08:26

## 2025-02-27 RX ADMIN — Medication 81 MILLIGRAM(S): at 10:36

## 2025-02-27 RX ADMIN — APIXABAN 10 MILLIGRAM(S): 2.5 TABLET, FILM COATED ORAL at 21:28

## 2025-02-27 RX ADMIN — INSULIN LISPRO 2: 100 INJECTION, SOLUTION INTRAVENOUS; SUBCUTANEOUS at 16:35

## 2025-02-27 RX ADMIN — BUPRENORPHINE 8 MILLIGRAM(S): 7.5 PATCH, EXTENDED RELEASE TRANSDERMAL at 21:28

## 2025-02-27 RX ADMIN — BUPRENORPHINE 8 MILLIGRAM(S): 7.5 PATCH, EXTENDED RELEASE TRANSDERMAL at 10:36

## 2025-02-27 RX ADMIN — ATORVASTATIN CALCIUM 40 MILLIGRAM(S): 80 TABLET, FILM COATED ORAL at 21:27

## 2025-02-27 RX ADMIN — Medication 40 MILLIGRAM(S): at 08:26

## 2025-02-27 RX ADMIN — INSULIN LISPRO 2: 100 INJECTION, SOLUTION INTRAVENOUS; SUBCUTANEOUS at 21:27

## 2025-02-27 RX ADMIN — INSULIN LISPRO 2: 100 INJECTION, SOLUTION INTRAVENOUS; SUBCUTANEOUS at 12:35

## 2025-02-27 RX ADMIN — FUROSEMIDE 20 MILLIGRAM(S): 10 INJECTION INTRAMUSCULAR; INTRAVENOUS at 13:14

## 2025-02-27 RX ADMIN — BUPRENORPHINE 8 MILLIGRAM(S): 7.5 PATCH, EXTENDED RELEASE TRANSDERMAL at 22:00

## 2025-02-27 RX ADMIN — APIXABAN 10 MILLIGRAM(S): 2.5 TABLET, FILM COATED ORAL at 10:36

## 2025-02-27 NOTE — PROGRESS NOTE ADULT - ASSESSMENT
69 y/o man with DM, HTN, CHF, past opioid disorder on Suboxone, also with anxiety and bipolar disorder, BIBEMS to  ED on 2/24 for shortness of breath, diaphoresis, dizziness and generalized weakness, found to have B/L PE with associated RV strain. Patient denied recent surgery/recent travel, history of VTE, but endorsed recent b/l ankle swelling. Admitted to CCU. Patient has since undergone catheter directed thrombolysis. He is now doing well, stable. SPO2 97% at rest on room air, 90% on room air with ambulation. Tolerating anticoagulation well. Stable for downgrade to Medicine and approaching clinical stability for discharge.     Bilateral pulmonary embolism  Associated R heart strain. No DVT in either LE. S/P catheter directed thrombolysis on 2/25. Patient currently stable, on room air, SPO2 95-97% at rest, 90% on room air with ambulation without dyspnea, dizziness, lightheadedness or palpitations. Per PE Response Team, patient can switch to oral anticoagulation. Patient has outpatient follow up 3/12/25 at 10AM. Will also refer to Hematology for completion of hypercoagulable work up.    Elevated troponin  Likely myocardial demand ischemia without infarction in setting of B/L LE and R heart strain. Echo with preserved EF. Continue aspirin, statin.     Elevated lactate  In setting of hypoperfusion from B/L PE, R heart strain, RV dysfunction.     Diabetes mellitus  Somewhat controlled, a1c 7.9. Continue insulin correctional scale.     HTN  BP controlled    HFpEF  ProBNP elevated though likely related to R heart strain from B/L PE burden. LVEF 5-55%. Severely enlarged RV cavity size and reduced RV systolic function. RA severely dilated. Moderate tricuspid regurgitation. Estimated pulmonary artery systolic pressure is 47 mmHg, consistent with mild to moderate pulmonary hypertension. Since received catheter directed thrombolysis and breathing is improved. No sign of decompensated CHF at this point. Appears euvolemic.     Past opioid use disorder  On suboxone    Anxiety, bipolar disorder  Stable

## 2025-02-27 NOTE — PROGRESS NOTE ADULT - ASSESSMENT
69 y/o M, poor historian, with PMH CHF with unknown LVEF, anxiety, bipolar disorder, PAD, DM, HTN, prior OUD (reportedly last use 20 years ago, on suboxone 8mg/2mg BID) who presents to the ED BIBEMS 2/24 from home for shortness of breath, generalized weakness, diaphoresis, and dizziness that started on the day of presentation. Found to have B/L PE with RHS.   No evidence of deep venous thrombosis in either lower extremity.  S/P CDT (cathter directed thrombolysis) on 2/25. Pt currently on RA, remains O2 at 95-97%. Ambulating O2 sat > 90% on RA and no symptoms of SOB or palpitation. Pt is stable and tolerating well on AC.   Plan for discharge home tomorrow per CCU  - may switch to DOAC   - will follow up as an outpt, scheduled on 3/12/25     Discussed the plan with Dr. Alicia, Pt and CCU team  71 y/o M, poor historian, with PMH CHF with unknown LVEF, anxiety, bipolar disorder, PAD, DM, HTN, prior OUD (reportedly last use 20 years ago, on suboxone 8mg/2mg BID) who presents to the ED BIBEMS 2/24 from home for shortness of breath, generalized weakness, diaphoresis, and dizziness that started on the day of presentation. Found to have B/L PE with RHS.   No evidence of deep venous thrombosis in either lower extremity.  S/P CDT (cathter directed thrombolysis) on 2/25. Pt currently on RA, remains O2 at 95-97%. Ambulating O2 sat > 90% on RA and no symptoms of SOB or palpitation. Pt is stable and tolerating well on AC.   Plan for discharge home tomorrow per CCU  - may switch to DOAC   - will follow up as an outpt, scheduled on 3/12/25 at 10 am     Discussed the plan with Dr. Alicia, Pt and CCU team  71 y/o M, poor historian, with PMH CHF with unknown LVEF, anxiety, bipolar disorder, PAD, DM, HTN, prior OUD (reportedly last use 20 years ago, on suboxone 8mg/2mg BID) who presents to the ED BIBEMS 2/24 from home for shortness of breath, generalized weakness, diaphoresis, and dizziness that started on the day of presentation. Found to have B/L PE with RHS.   No evidence of deep venous thrombosis in either lower extremity.  S/P CDT (cathter directed thrombolysis) on 2/25. Pt currently on RA, remains O2 at 95-97%. Ambulating O2 sat > 90% on RA and no symptoms of SOB or palpitation. Pt is stable and tolerating well on AC.   Plan for discharge home tomorrow per CCU  - may switch heparin gtt to DOAC in am   - will follow up as an outpt, scheduled on 3/12/25 at 10 am     Discussed the plan with Dr. Alicia, Pt and CCU team

## 2025-02-27 NOTE — PROGRESS NOTE ADULT - TIME BILLING
- extensive review of patient's medical chart including prior hospital encounters, current admission progress notes, labs, imaging and other testing, seeing and evaluating patient at bedside, explaining to patient regarding current condition and plan of care, then ordering tests and collaborating with specialty consultants including PE Team (Vascular Cardiology) and Critical Care Team, and finally, documenting today's findings and plan.

## 2025-02-27 NOTE — DISCHARGE NOTE NURSING/CASE MANAGEMENT/SOCIAL WORK - PATIENT PORTAL LINK FT
You can access the FollowMyHealth Patient Portal offered by [facility] by registering at the following website: [email or web address]. By joining [facility]’s FollowMyHealth portal, you will also be able to view your health information using other applications (apps) compatible with our system.

## 2025-02-27 NOTE — DISCHARGE NOTE NURSING/CASE MANAGEMENT/SOCIAL WORK - NSDCPEFALRISK_GEN_ALL_CORE
For information on Fall & Injury Prevention, visit: [email or web address] OR  [email or web address] OR  https://www.cdc.gov/steadi/patient.html

## 2025-02-27 NOTE — DISCHARGE NOTE NURSING/CASE MANAGEMENT/SOCIAL WORK - NSDCPEELIQUISDIET_GEN_ALL_CORE
Eat healthy foods you enjoy. Apixaban/Eliquis DOES NOT have a special diet. Limit your alcohol intake. same name as above

## 2025-02-27 NOTE — PHYSICAL THERAPY INITIAL EVALUATION ADULT - ADDITIONAL COMMENTS
Lives in second floor apt. No elevator. 20 steps with B HR. Uses cane at all times. Walks to local stores for shopping. Has friends to bring him grocery shopping as needed. laundromat across the street, Does not Drive.

## 2025-02-27 NOTE — DISCHARGE NOTE NURSING/CASE MANAGEMENT/SOCIAL WORK - FINANCIAL ASSISTANCE
[facility] provides services at a reduced cost to those who are determined to be eligible through [facility]’s financial assistance program. Information regarding [facility]’s financial assistance program can be found by going to [email or web address] or by calling [phone].

## 2025-02-27 NOTE — DISCHARGE NOTE NURSING/CASE MANAGEMENT/SOCIAL WORK - NSDCPEWEB_GEN_ALL_CORE
[facility] for Tobacco Control website --- [email or web address] website --- [email or web address]

## 2025-02-27 NOTE — PHYSICAL THERAPY INITIAL EVALUATION ADULT - GAIT DEVIATIONS NOTED, PT EVAL
O2 sat amb on room air 90%- at rest on room air 95%-RT present/decreased sunita/decreased step length/decreased stride length/increased stride width

## 2025-02-27 NOTE — DISCHARGE NOTE NURSING/CASE MANAGEMENT/SOCIAL WORK - NSDCPEEMAIL_GEN_ALL_CORE
[facility] for Tobacco Control email [email or web address]

## 2025-02-27 NOTE — PHYSICAL THERAPY INITIAL EVALUATION ADULT - GENERAL OBSERVATIONS, REHAB EVAL
Pre and post session: seated in chair with chair alarm on. Call bell and phone in reach. Monitor in place. No c/o pain. Tolerated well and would benefit from further skilled PT for functional mobility training.

## 2025-02-27 NOTE — PROGRESS NOTE ADULT - SUBJECTIVE AND OBJECTIVE BOX
[facility] Vascular Cardiology Team   [facility]/ [facility] Faculty Practice   Office [address] [facility] 1st Floor   [facility] [address]  Telephone; [phone] Fax: [phone]      Mr. Clark is a 69 y/o M, poor historian, with PMH CHF with unknown LVEF, anxiety, bipolar disorder, PAD, DM, HTN, prior OUD (reportedly last use 20 years ago, on suboxone 8mg/2mg BID) who presents to the ED BIBEMS 2/24 from home for shortness of breath, generalized weakness, diaphoresis, and dizziness that started on the day of presentation. Patient reports that he woke from sleep at 1300, and shortly after had sudden onset of SOB, lightheadedness, and diaphoresis. Patient denies recent surgery/recent travel, history of VTE, but endorses recent b/l ankle swelling.   Patient found to have b/l PE with associated RV strain.    Subjective/Observations: Pt. was seen at the bedside in this morning, Pt ambulated with respiratory therapist, reported O2 sat 90% on RA, HR: 120s-130s while walking with walker, then recovered to 95% O2 sat, HR 100s at rest, Pt denies SOB or palpitation.     REVIEW OF SYSTEMS: All other review of systems is negative unless indicated above    PAST MEDICAL & SURGICAL HISTORY:  Poor historian  HTN (hypertension)  PAD (peripheral artery disease)  Bipolar disorder  Anxiety  CHF with unknown LVEF  H/O hernia repair  H/O knee surgery    MEDICATIONS  (STANDING):  amphetamine/dextroamphetamine 20 milliGRAM(s) Oral three times a day  apixaban 10 milliGRAM(s) Oral two times a day  aspirin  chewable 81 milliGRAM(s) Oral daily  atorvastatin 40 milliGRAM(s) Oral at bedtime  buprenorphine 8 milliGRAM(s) SubLingual <User Schedule>  dextrose 5%. 1000 milliLiter(s) (100 mL/Hr) IV Continuous <Continuous>  dextrose 5%. 1000 milliLiter(s) (50 mL/Hr) IV Continuous <Continuous>  dextrose 50% Injectable 25 Gram(s) IV Push once  dextrose 50% Injectable 12.5 Gram(s) IV Push once  dextrose 50% Injectable 25 Gram(s) IV Push once  glucagon  Injectable 1 milliGRAM(s) IntraMuscular once  insulin lispro (ADMELOG) corrective regimen sliding scale   SubCutaneous Before meals and at bedtime  pantoprazole    Tablet 40 milliGRAM(s) Oral before breakfast    MEDICATIONS  (PRN):  dextrose Oral Gel 15 Gram(s) Oral once PRN Blood Glucose LESS THAN 70 milliGRAM(s)/deciliter      Allergies: No Known Allergies    Vital Signs Last 24 Hrs  T(C): 36.8 (27 Feb 2025 16:00), Max: 36.9 (27 Feb 2025 05:20)  T(F): 98.2 (27 Feb 2025 16:00), Max: 98.5 (27 Feb 2025 05:20)  HR: 110 (27 Feb 2025 15:00) (101 - 110)  BP: 119/66 (27 Feb 2025 15:00) (104/80 - 130/82)  BP(mean): 82 (27 Feb 2025 15:00) (82 - 121)  RR: 18 (27 Feb 2025 15:00) (9 - 19)  SpO2: 93% (27 Feb 2025 13:00) (91% - 97%)    Parameters below as of 27 Feb 2025 13:00  Patient On (Oxygen Delivery Method): room air      Constitutional: well developed, well nourished, no deformities and no acute distress  Neurological: Alert & Oriented x 3, WOO, no focal deficits  HEENT: NC/AT, PERRLA, EOMI,  Neck supple.  Respiratory: CTA B/L, No wheezing/crackles/rhonchi  Cardiovascular: (+) S1 & S2, RRR, No murmur/ rub/ gallop   Gastrointestinal: soft, Nontender, nondistended, (+) BS  Genitourinary: non distended bladder, voiding freely  Extremities: Rt arm access with ecchymotic, 2+ radial pulses               LABS: All Labs Reviewed:                        14.6   12.40 )-----------( 272      ( 27 Feb 2025 05:13 )             44.1     02-27    136  |  103  |  20  ----------------------------<  156[H]  3.9   |  24  |  0.79    Ca    9.7      27 Feb 2025 05:13  Phos  3.2     02-26  Mg     2.2     02-26    TPro  7.3  /  Alb  3.4  /  TBili  1.2  /  DBili  x   /  AST  14[L]  /  ALT  23  /  AlkPhos  74  02-27    PTT - ( 27 Feb 2025 05:13 )  PTT:35.3 sec    Blood Culture:   RADIOLOGY/EKG:    < from: CT Angio Chest PE Protocol w/ IV Cont (02.24.25 @ 16:32) >  IMPRESSION:    Bilateral pulmonary emboli involving all lobes, and associated right   heart strain. This was discussed with Dr. Novak at 4:49 PM on 2/4/2025,   with readback confirmation.    Increased size of nonspecific lymph node adjacent to the distal   esophagus. Recommend follow-up CT chest in 3-6 months.    < end of copied text >      Echo:  < from: TTE W or WO Ultrasound Enhancing Agent (02.24.25 @ 18:02) >  CONCLUSIONS:      1. Technically difficult image quality.   2. Left ventricular systolic function is normal with an ejection fraction visually estimated at 50 to 55 %.   3. Mild left ventricular hypertrophy.   4. Severely enlarged right ventricular cavity size and reduced right ventricular systolic function. Tricuspid annular plane systolic excursion (TAPSE) is 1.3 cm (normal >=1.7 cm).   5. The right atrium is severely dilated.   6. Moderate tricuspid regurgitation.   7. Estimated pulmonary artery systolic pressure is 47 mmHg, consistent with mild to moderate pulmonary hypertension.      < from: US Duplex Venous Lower Ext Complete, Bilateral (02.24.25 @ 18:06) >  RIGHT:  Normal compressibility of the RIGHT common femoral, femoral and popliteal   veins.  Doppler examination shows normal spontaneous and phasic flow.  No RIGHT calf vein thrombosis is detected.    Subcutaneous edema in the right calf.    LEFT:  Normal compressibility of the LEFT common femoral, femoral and popliteal   veins.  Doppler examination shows normal spontaneous and phasic flow.  No LEFT calf vein thrombosis is detected.    IMPRESSION:  No evidence of deep venous thrombosis in either lower extremity.    Cath:   < from: Invasive Peripheral Vascular Reporting (02.25.25 @ 10:04) >  Conclusions:   -Successful placement of 5French Lytic Catheters into the right and  leftpulmonary arteries    Recommendations:     -c/w heparin drip along with alteplase infusion into the lytic  catheters (for 12 hours)  Acute complication:    No complications     < end of copied text >

## 2025-02-27 NOTE — PROGRESS NOTE ADULT - SUBJECTIVE AND OBJECTIVE BOX
Interval History:             Patient on room air, ambulating sat drops to 90             Feeling better each day               HPI:   70M with PMHx CHF, , PAD, HTN, prior substance abuse who presented with SOB, diaphoresis, and dizziness with sudden onset. Workup revealed extensive bilateral PE with RV strain,   biomarkers. . No hypotension noted. Initially placed on HFNC/NRB for hypoxia. Not a candidate for urgent cath direct thrombectomy given distal nature of clots. Started on IV AC and admitted to CCU.   afebrile,   Has bilateral lower extremity dopplers negative for dvt, has chronic lower abdominal swelling  Echo ef 55% with rv strain and moderate pulmonary htn  underwent catheter directed thrombolysis on 1/25    ROS: no recent travel, no chest pain, sob, no abdominal pain, no fevers, no chills          no hx. of clots, hx. PVD, no recent travel     MEDICATIONS  (STANDING):  amphetamine/dextroamphetamine 20 milliGRAM(s) Oral three times a day  apixaban 10 milliGRAM(s) Oral two times a day  aspirin  chewable 81 milliGRAM(s) Oral daily  atorvastatin 40 milliGRAM(s) Oral at bedtime  buprenorphine 8 milliGRAM(s) SubLingual <User Schedule>  dextrose 5%. 1000 milliLiter(s) (100 mL/Hr) IV Continuous <Continuous>  dextrose 5%. 1000 milliLiter(s) (50 mL/Hr) IV Continuous <Continuous>  dextrose 50% Injectable 25 Gram(s) IV Push once  dextrose 50% Injectable 12.5 Gram(s) IV Push once  dextrose 50% Injectable 25 Gram(s) IV Push once  glucagon  Injectable 1 milliGRAM(s) IntraMuscular once  insulin lispro (ADMELOG) corrective regimen sliding scale   SubCutaneous Before meals and at bedtime  pantoprazole    Tablet 40 milliGRAM(s) Oral before breakfast    MEDICATIONS  (PRN):  dextrose Oral Gel 15 Gram(s) Oral once PRN Blood Glucose LESS THAN 70 milliGRAM(s)/deciliter    ICU Vital Signs Last 24 Hrs  T(C): 36.9 (27 Feb 2025 05:20), Max: 36.9 (27 Feb 2025 05:20)  T(F): 98.5 (27 Feb 2025 05:20), Max: 98.5 (27 Feb 2025 05:20)  HR: 110 (27 Feb 2025 09:00) (96 - 110)  BP: 121/79 (27 Feb 2025 09:00) (104/80 - 131/89)  BP(mean): 89 (27 Feb 2025 09:00) (83 - 98)  ABP: --  ABP(mean): --  RR: 11 (27 Feb 2025 09:00) (9 - 19)  SpO2: 94% (27 Feb 2025 08:00) (92% - 98%)    O2 Parameters below as of 27 Feb 2025 08:00  Patient On (Oxygen Delivery Method): room air    Physical Exam    General - obese, no distress  HEENT - nc/at  neck supple  lungs clear to ausculation  cv - rrr  abdomen - soft, non tender  Neuro - awake, and alert appropriate  extremities - bilateral edema    I&O's Summary    26 Feb 2025 07:01  -  27 Feb 2025 07:00  --------------------------------------------------------  IN: 0 mL / OUT: 2425 mL / NET: -2425 mL                          14.6   12.40 )-----------( 272      ( 27 Feb 2025 05:13 )             44.1       02-27    136  |  103  |  20  ----------------------------<  156[H]  3.9   |  24  |  0.79    Ca    9.7      27 Feb 2025 05:13  Phos  3.2     02-26  Mg     2.2     02-26    TPro  7.3  /  Alb  3.4  /  TBili  1.2  /  DBili  x   /  AST  14[L]  /  ALT  23  /  AlkPhos  74  02-27    Urinalysis Basic - ( 27 Feb 2025 05:13 )    Color: x / Appearance: x / SG: x / pH: x  Gluc: 156 mg/dL / Ketone: x  / Bili: x / Urobili: x   Blood: x / Protein: x / Nitrite: x   Leuk Esterase: x / RBC: x / WBC x   Sq Epi: x / Non Sq Epi: x / Bacteria: x    DVT Prophylaxis:  Eliquis                                                               Advanced Directives: Full Note     Sepsis Criteria Met - no    Labs, Notes, Orders, radiologic studies reviewed and care coordinated with multidisciplinary team

## 2025-02-27 NOTE — PROGRESS NOTE ADULT - SUBJECTIVE AND OBJECTIVE BOX
Chief Complaint: Shortness of breath, dizziness, general weakness, diaphoresis    Interval Hx: Patient seen and examined. Reports feeling improved. Denies dyspnea, chest pain, chest tightness. No dizziness, lightheadedness or palpitations. Oxygenating well on room air. Ambulating comfortably. Did not meet criteria for home O2.     ROS: Multi system review is comprehensively negative x  10 systems except as above    Vitals:  T(F): 98.2 (27 Feb 2025 16:00), Max: 98.5 (27 Feb 2025 05:20)  HR: 110 (27 Feb 2025 15:00) (101 - 110)  BP: 119/66 (27 Feb 2025 15:00) (104/80 - 130/82)  RR: 18 (27 Feb 2025 15:00) (10 - 19)  SpO2: 93% (27 Feb 2025 13:00) (91% - 97%) on room air    Exam:  Gen: Comfortably  HEENT: NCAT PERRL EOMI MMM clear oropharynx  Neck: Supple, no JVD, no LAD  CVS: s1 s2 normal, regular, rate ~100  Chest: Normal resp effort at rest, lungs CTA B/L  Abd: Non distended, +BS, soft, non tender  Ext: No edema. 2+ pulses   Skin: Warm, dry  Mood: Calm, pleasant  Neuro: A+OX3     Labs:                        14.6   12.40 )-------( 272                   44.1       136  |  103  |  20  ----------------------<  156  3.9   |   24   |  0.79    Ca   9.7      Phos  3.2      Mg   2.2    TPro  7.3  /  Alb  3.4  /  TBili  1.2  /  DBili  x   /  AST  14  /  ALT  23  /  AlkPhos  74     Troponin 792-->1535-->1366   ProBNP 449-->2672    A1c 7.9   Lactate 3.7-->3.0    DDimer 2491    Imaging:  US venous duplex B/L LE 2/24: No evidence of deep venous thrombosis in either lower extremity.     CTA chest PE protocol 2/24: Bilateral pulmonary thromboemboli in the right interlobar, and all lobar arteries, as well as multiple more distal branches. Right heart enlargement with RV: LV ratio greater than 1. No pericardial effusion. Coronary artery calcification. Normal aortic size. No endobronchial lesion. Clear lungs. No pleural effusion. 1cm short axis lymph node adjacent to the distal esophagus (2-103), prior 0.5cm. Multilevel spondylosis.    Cardiac Testing:  TTE 2/24: Technically difficult image quality. Left ventricular systolic function is normal with an ejection fraction visually estimated at 50 to 55 %. Mild left ventricular hypertrophy. Severely enlarged right ventricular cavity size and reduced right ventricular systolic function. Tricuspid annular plane systolic excursion (TAPSE) is 1.3 cm (normal >=1.7 cm). The right atrium is severely dilated. Moderate tricuspid regurgitation. Estimated pulmonary artery systolic pressure is 47 mmHg, consistent with mild to moderate pulmonary hypertension.    EKG 2/24: Sinus tachycardia, . Low voltage QRS RSR' or QR pattern in V1 suggests right ventricular conduction delay    Meds:  MEDICATIONS  (STANDING):  amphetamine/dextroamphetamine 20 milliGRAM(s) Oral three times a day  apixaban 10 milliGRAM(s) Oral two times a day  aspirin  chewable 81 milliGRAM(s) Oral daily  atorvastatin 40 milliGRAM(s) Oral at bedtime  buprenorphine 8 milliGRAM(s) SubLingual <User Schedule>  insulin lispro (ADMELOG) corrective regimen sliding scale   SubCutaneous Before meals and at bedtime  pantoprazole    Tablet 40 milliGRAM(s) Oral before breakfast    MEDICATIONS  (PRN):  dextrose Oral Gel 15 Gram(s) Oral once PRN Blood Glucose LESS THAN 70 milliGRAM(s)/deciliter

## 2025-02-27 NOTE — PHYSICAL THERAPY INITIAL EVALUATION ADULT - PERTINENT HX OF CURRENT PROBLEM, REHAB EVAL
Mr. Clark is a 69 y/o M, poor historian, with PMH CHF with unknown LVEF, anxiety, bipolar disorder, PAD, DM, HTN, prior OUD (reportedly last use 20 years ago, on suboxone 8mg/2mg BID) who presents to the ED BIBEMS 2/24 from home for shortness of breath, generalized weakness, diaphoresis, and dizziness that started on the day of presentation. Patient reports that he woke from sleep at 1300, and shortly after had sudden onset of SOB, lightheadedness, and diaphoresis. Patient denies recent surgery/recent travel, history of VTE, but endorses recent b/l ankle swelling. Workup revealed extensive bilateral PE with RV strain,   biomarkers. . No hypotension noted. Initially placed on HFNC/NRB for hypoxia. Not a candidate for urgent cath direct thrombectomy given distal nature of clots. Started on IV AC and admitted to CCU.   afebrile,   Has bilateral lower extremity dopplers negative for dvt, has chornic lower abdominal swelling  Echo ef 55% with rv strain and moderate pulmonary htn  underwent catheter directed thrombolysis on 1/25

## 2025-02-27 NOTE — PROGRESS NOTE ADULT - ASSESSMENT
Mr. Clark is a 71 y/o M,  with PMH CHF  , PAD, DM, HTN,  admitted with b/l PE with associated right heart strain, hypoxia, SOB, initially  requiring NIV.   with significant clot burden , unprovoked  underwent catheter directed thrombolysis  Acute Hypoxic respiratory failrue  BIlateral PEs  hx. chf    Plan:  Neuro: - Chronically on suboxone, continue home dose BID   CV: - Echo initially  with RV strain, significant clot burden EF 55%,  s/p thrombolysis, rv dysfunction, leg swelling  will give dose lasix  Pulm:  - B/l PEs, hypoxic, required HFNC. titrated to room air  GI - Diet as tolerated  Renal: creatinine 0.8 this am  ID - no evidence of infection  Endo: - Hx diabetes, was on metformin, sliding scale  DVT Prophylaxis  eliquis  seen by  Physical therapy ambulate possible home tomorrow

## 2025-02-28 ENCOUNTER — TRANSCRIPTION ENCOUNTER (OUTPATIENT)
Age: 70
End: 2025-02-28

## 2025-02-28 VITALS
OXYGEN SATURATION: 100 % | RESPIRATION RATE: 18 BRPM | HEART RATE: 104 BPM | DIASTOLIC BLOOD PRESSURE: 77 MMHG | SYSTOLIC BLOOD PRESSURE: 129 MMHG

## 2025-02-28 LAB
FIBRINOGEN AG PPP IA-MCNC: 547 MG/DL — HIGH (ref 233–496)
GLUCOSE BLDC GLUCOMTR-MCNC: 166 MG/DL — HIGH (ref 70–99)
GLUCOSE BLDC GLUCOMTR-MCNC: 182 MG/DL — HIGH (ref 70–99)

## 2025-02-28 PROCEDURE — 74177 CT ABD & PELVIS W/CONTRAST: CPT | Mod: 26

## 2025-02-28 PROCEDURE — 99222 1ST HOSP IP/OBS MODERATE 55: CPT

## 2025-02-28 PROCEDURE — 99239 HOSP IP/OBS DSCHRG MGMT >30: CPT

## 2025-02-28 RX ORDER — ISOSORBIDE MONONITRATE 60 MG/1
1 TABLET, EXTENDED RELEASE ORAL
Refills: 0 | DISCHARGE

## 2025-02-28 RX ORDER — METOPROLOL SUCCINATE 50 MG/1
0.5 TABLET, EXTENDED RELEASE ORAL
Qty: 16 | Refills: 0
Start: 2025-02-28

## 2025-02-28 RX ORDER — ESCITALOPRAM OXALATE 20 MG/1
1 TABLET ORAL
Qty: 60 | Refills: 0
Start: 2025-02-28

## 2025-02-28 RX ORDER — APIXABAN 2.5 MG/1
2 TABLET, FILM COATED ORAL
Qty: 74 | Refills: 0
Start: 2025-02-28

## 2025-02-28 RX ORDER — ESCITALOPRAM OXALATE 20 MG/1
1 TABLET ORAL
Refills: 0 | DISCHARGE

## 2025-02-28 RX ORDER — LISINOPRIL 5 MG/1
1 TABLET ORAL
Refills: 0 | DISCHARGE

## 2025-02-28 RX ORDER — AMLODIPINE BESYLATE 10 MG/1
1 TABLET ORAL
Refills: 0 | DISCHARGE

## 2025-02-28 RX ORDER — METOPROLOL SUCCINATE 50 MG/1
1 TABLET, EXTENDED RELEASE ORAL
Refills: 0 | DISCHARGE

## 2025-02-28 RX ADMIN — Medication 81 MILLIGRAM(S): at 09:42

## 2025-02-28 RX ADMIN — APIXABAN 10 MILLIGRAM(S): 2.5 TABLET, FILM COATED ORAL at 09:43

## 2025-02-28 RX ADMIN — BUPRENORPHINE 8 MILLIGRAM(S): 7.5 PATCH, EXTENDED RELEASE TRANSDERMAL at 09:43

## 2025-02-28 RX ADMIN — Medication 40 MILLIGRAM(S): at 06:26

## 2025-02-28 RX ADMIN — DEXTROAMPHETAMINE SACCHARATE, AMPHETAMINE ASPARTATE MONOHYDRATE, DEXTROAMPHETAMINE SULFATE AND AMPHETAMINE SULFATE 20 MILLIGRAM(S): 2.5; 2.5; 2.5; 2.5 CAPSULE, EXTENDED RELEASE ORAL at 14:30

## 2025-02-28 RX ADMIN — INSULIN LISPRO 1: 100 INJECTION, SOLUTION INTRAVENOUS; SUBCUTANEOUS at 12:03

## 2025-02-28 RX ADMIN — DEXTROAMPHETAMINE SACCHARATE, AMPHETAMINE ASPARTATE MONOHYDRATE, DEXTROAMPHETAMINE SULFATE AND AMPHETAMINE SULFATE 20 MILLIGRAM(S): 2.5; 2.5; 2.5; 2.5 CAPSULE, EXTENDED RELEASE ORAL at 06:26

## 2025-02-28 RX ADMIN — INSULIN LISPRO 1: 100 INJECTION, SOLUTION INTRAVENOUS; SUBCUTANEOUS at 08:27

## 2025-02-28 NOTE — PROGRESS NOTE ADULT - ASSESSMENT
69 y/o man with DM, HTN, CHF, past opioid disorder on Suboxone, also with anxiety and bipolar disorder, BIBEMS to  ED on 2/24 for shortness of breath, diaphoresis, dizziness and generalized weakness, found to have B/L PE with associated RV strain. Patient denied recent surgery/recent travel, history of VTE, but endorsed recent b/l ankle swelling. Admitted to CCU. Patient has since undergone catheter directed thrombolysis. He is now doing well, stable. SPO2 97% at rest on room air, 90% on room air with ambulation. Tolerating anticoagulation well. Stable for downgrade to Medicine and approaching clinical stability for discharge.     Bilateral pulmonary embolism  Associated R heart strain. No DVT in either LE. S/P catheter directed thrombolysis (CDT) on 2/25. Patient currently stable, on room air, SPO2 95-97% at rest, 90% on room air with ambulation without dyspnea, dizziness, lightheadedness or palpitations. Per PE Response Team, patient switched to oral anticoagulation, tolerating that well. Patient has outpatient follow up 3/12/25 at 10AM with PE Team (Vascular Cardiology). Will obtain CT A/P for completeness given unprovoked PE. Will also refer to Hematology for completion of hypercoagulable work up as outpatient. Lastly, noted paraesophageal LN, may need further investigation of that with GI, deferring until a bit further out from the B/L PE with RV strain requiring CDT.     Elevated troponin  Likely myocardial demand ischemia without infarction in setting of B/L LE and R heart strain. Echo with preserved EF. Continue aspirin, statin.     Elevated lactate  In setting of hypoperfusion from B/L PE, R heart strain, RV dysfunction.     Diabetes mellitus  Somewhat controlled, a1c 7.9. Continue insulin correctional scale.     HTN  BP controlled    HFpEF  ProBNP elevated though likely related to R heart strain from B/L PE burden. LVEF 5-55%. Severely enlarged RV cavity size and reduced RV systolic function. RA severely dilated. Moderate tricuspid regurgitation. Estimated pulmonary artery systolic pressure is 47 mmHg, consistent with mild to moderate pulmonary hypertension. Since received catheter directed thrombolysis and breathing is improved. No sign of decompensated CHF at this point. Appears euvolemic.     Past opioid use disorder  On suboxone    Anxiety, bipolar disorder  Stable      Dispo: Possible DC home today pending CT A/P.

## 2025-02-28 NOTE — CONSULT NOTE ADULT - SUBJECTIVE AND OBJECTIVE BOX
HPI: 71 yo M with SOB admitted with pulmonary embolism, s/p catheter directed thrombolysis doing well. A CT A/P was performed to look for an etiology of the PE's, revealing multiple hernias with non-obstructed bowel, and mesenteric edema suggestive of possible early incarceration, warranting a surgical consultation. Upon arrival, pt states he has been having generalized abdominal discomfort which waxes and wanes for the last couple of months, which he attributes to his body habitus and existing hernias. pt denies any acute onset of abdominal pain since his admission. Denies f/c/n/v/diarrhea constipation.         PAST MEDICAL & SURGICAL HISTORY:  Poor historian  HTN (hypertension)  PAD (peripheral artery disease)  Bipolar disorder  Anxiety  CHF with unknown LVEF  Poor historian  H/O hernia repair  H/O knee surgery          MEDICATIONS  (STANDING):  amphetamine/dextroamphetamine 20 milliGRAM(s) Oral three times a day  apixaban 10 milliGRAM(s) Oral two times a day  aspirin  chewable 81 milliGRAM(s) Oral daily  atorvastatin 40 milliGRAM(s) Oral at bedtime  buprenorphine 8 milliGRAM(s) SubLingual <User Schedule>  dextrose 5%. 1000 milliLiter(s) (100 mL/Hr) IV Continuous <Continuous>  dextrose 5%. 1000 milliLiter(s) (50 mL/Hr) IV Continuous <Continuous>  dextrose 50% Injectable 25 Gram(s) IV Push once  dextrose 50% Injectable 12.5 Gram(s) IV Push once  dextrose 50% Injectable 25 Gram(s) IV Push once  glucagon  Injectable 1 milliGRAM(s) IntraMuscular once  insulin lispro (ADMELOG) corrective regimen sliding scale   SubCutaneous Before meals and at bedtime  pantoprazole    Tablet 40 milliGRAM(s) Oral before breakfast    MEDICATIONS  (PRN):  dextrose Oral Gel 15 Gram(s) Oral once PRN Blood Glucose LESS THAN 70 milliGRAM(s)/deciliter      Allergies    No Known Allergies    Intolerances        SOCIAL HISTORY:    FAMILY HISTORY:  Family history of heart attack (Father)  Father- 52            Physical Exam:  General: NAD, resting comfortably  HEENT: NC/AT, EOMI, normal hearing, no oral lesions, no LAD, neck supple  Pulmonary: normal resp effort, CTA-B  Cardiovascular: NSR, no murmurs  Abdominal: soft, morbidly obese, hernias visible, ND/NT, no pain elicited with deep palpation of abdomen/hernias.  Extremities: WWP, normal strength, no clubbing/cyanosis/edema  Neuro: A/O x 3, CNs II-XII grossly intact, normal sensation, no focal deficits  Pulses: palpable distal pulses    Vital Signs Last 24 Hrs  T(C): 36.2 (28 Feb 2025 07:42), Max: 36.8 (27 Feb 2025 16:00)  T(F): 97.2 (28 Feb 2025 07:42), Max: 98.2 (27 Feb 2025 16:00)  HR: 104 (28 Feb 2025 14:37) (100 - 110)  BP: 129/77 (28 Feb 2025 14:37) (113/77 - 131/81)  BP(mean): 90 (28 Feb 2025 14:37) (90 - 95)  RR: 18 (28 Feb 2025 14:37) (13 - 18)  SpO2: 100% (28 Feb 2025 14:37) (95% - 100%)    Parameters below as of 28 Feb 2025 14:37  Patient On (Oxygen Delivery Method): room air        I&O's Summary          LABS:                        14.6   12.40 )-----------( 272      ( 27 Feb 2025 05:13 )             44.1     02-27    136  |  103  |  20  ----------------------------<  156[H]  3.9   |  24  |  0.79    Ca    9.7      27 Feb 2025 05:13    TPro  7.3  /  Alb  3.4  /  TBili  1.2  /  DBili  x   /  AST  14[L]  /  ALT  23  /  AlkPhos  74  02-27    PTT - ( 27 Feb 2025 05:13 )  PTT:35.3 sec  Urinalysis Basic - ( 27 Feb 2025 05:13 )    Color: x / Appearance: x / SG: x / pH: x  Gluc: 156 mg/dL / Ketone: x  / Bili: x / Urobili: x   Blood: x / Protein: x / Nitrite: x   Leuk Esterase: x / RBC: x / WBC x   Sq Epi: x / Non Sq Epi: x / Bacteria: x      CAPILLARY BLOOD GLUCOSE      POCT Blood Glucose.: 166 mg/dL (28 Feb 2025 12:02)  POCT Blood Glucose.: 182 mg/dL (28 Feb 2025 08:26)  POCT Blood Glucose.: 233 mg/dL (27 Feb 2025 21:22)  POCT Blood Glucose.: 213 mg/dL (27 Feb 2025 16:12)    LIVER FUNCTIONS - ( 27 Feb 2025 05:13 )  Alb: 3.4 g/dL / Pro: 7.3 gm/dL / ALK PHOS: 74 U/L / ALT: 23 U/L / AST: 14 U/L / GGT: x             RADIOLOGY & ADDITIONAL STUDIES:  CT A/P 2/28:  IMPRESSION:  2 large right abdominal wall hernias. There is unobstructed bowel in both   hernia sacs. However, there is mesenteric edema in the more superior   lateral hernia sac. Correlate clinically for infection, inflammation or   possible early incarceration.

## 2025-02-28 NOTE — DISCHARGE NOTE PROVIDER - NSDCCPCAREPLAN_GEN_ALL_CORE_FT
PRINCIPAL DISCHARGE DIAGNOSIS  Diagnosis: Bilateral pulmonary embolism  Assessment and Plan of Treatment: You were admitted to the hospital for bilateral pulmonary embolism  associated with right strain. You underwent catheter-directed thrombolysis, which helped to reduce your blood clot burden. You tolerated that well and you are now stable, breathing comfortably on room air both at rest and with activity, since switched to oral anticoagulation and now stable for discharge home. Follow up 3/12/25 at 10AM with Pulmonary Emoli Team (Vascular Cardiology). Follow up with Hematology as outpatient for completion of hypercoagulable work up. Referred to Dr. Gu. Continue medications as prescribed and attend appointments as advised.      SECONDARY DISCHARGE DIAGNOSES  Diagnosis: Elevated troponin  Assessment and Plan of Treatment: Likely myocardial demand ischemia without infarction in setting of extensive pulmonary embolism with heart strain. Left ventricle with normal function. Continue on metoprolol, aspirin and statin. Follow up with Cardiology Dr. Green upon discharge. Preferably within 1-2 weeks.    Diagnosis: Elevated lactic acid level  Assessment and Plan of Treatment: In setting of hypoperfusion from the emboli. Improved.    Diagnosis: Diabetes mellitus  Assessment and Plan of Treatment: Somewhat controlled, a1c 7.9. Resume home regimen upon discharge. Follow up with Primary Care as outpatient.    Diagnosis: HTN (hypertension)  Assessment and Plan of Treatment: Blood pressure low-normal, only on low-dose metoprolol for now. Follow up with Cardiology and [facility] closely after discharge to reassess regimen, re-check blood pressure.    Diagnosis: Chronic diastolic CHF (congestive heart failure)  Assessment and Plan of Treatment: Echocardiogram performed. Normal left ventricular function. No sign of decompensated heart failure at this point, with you breathing comfortably, on room air, doing well. Continue on low dose metoprolol given low-normal blood pressure. Other heart failure medications currently on hold pending close follow up with Cardiology Dr. Green.    Diagnosis: Abdominal hernia  Assessment and Plan of Treatment: Two large right lower abdominal hernias noted on CT abdomen and pelvis. The more superior lateral hernia sac contained unobstructed small bowel and measured approximately 10.9 x 8.8 x 6.3 cm. There was mild mesenteric edema within this hernia sac. The more inferior medial hernia sac contained unobstructed small bowel and measured 10.4 x 14.9 x 7.5 cm. Reassuringly, you have no abdominal complaints. No abdominal pain. Tolerating food. Passing gas and stool. No fever or rigors. Surgery was consulted to advise. No acute surgical intervention. You were also noted to have a paraesophageal lymph node mildly enlarged, may need further investigation of that with Gastroenterology, deferring until a bit further out from the pulmonary embolism. Please discuss further with your Primary Care provider and/or Hematology.     PRINCIPAL DISCHARGE DIAGNOSIS  Diagnosis: Bilateral pulmonary embolism  Assessment and Plan of Treatment: You were admitted to the hospital for bilateral pulmonary embolism  associated with right strain. You underwent catheter-directed thrombolysis, which helped to reduce your blood clot burden. You tolerated that well and you are now stable, breathing comfortably on room air both at rest and with activity, since switched to oral anticoagulation and now stable for discharge home. Follow up 3/12/25 at 10AM with Pulmonary Emoli Team (Vascular Cardiology). Follow up with Hematology as outpatient for completion of hypercoagulable work up. Referred to Dr. Gu. Continue medications as prescribed and attend appointments as advised.      SECONDARY DISCHARGE DIAGNOSES  Diagnosis: Elevated troponin  Assessment and Plan of Treatment: Likely myocardial demand ischemia without infarction in setting of extensive pulmonary embolism with heart strain. Left ventricle with normal function. Continue on metoprolol, aspirin and statin. Follow up with Cardiology Dr. Green upon discharge. Preferably within 1-2 weeks.    Diagnosis: Elevated lactic acid level  Assessment and Plan of Treatment: In setting of hypoperfusion from the emboli. Improved.    Diagnosis: Diabetes mellitus  Assessment and Plan of Treatment: Somewhat controlled, a1c 7.9. Resume home regimen upon discharge. Follow up with Primary Care as outpatient.    Diagnosis: HTN (hypertension)  Assessment and Plan of Treatment: Blood pressure low-normal, only on low-dose metoprolol for now. Follow up with Cardiology and [facility] closely after discharge to reassess regimen, re-check blood pressure.    Diagnosis: Chronic diastolic CHF (congestive heart failure)  Assessment and Plan of Treatment: Echocardiogram performed. Normal left ventricular function. No sign of decompensated heart failure at this point, with you breathing comfortably, on room air, doing well. Continue on low dose metoprolol given low-normal blood pressure. Other heart failure medications currently on hold pending close follow up with Cardiology Dr. Green.    Diagnosis: Abdominal hernia  Assessment and Plan of Treatment: Two large right lower abdominal hernias noted on CT abdomen and pelvis. The more superior lateral hernia sac contained unobstructed small bowel and measured approximately 10.9 x 8.8 x 6.3 cm. There was mild mesenteric edema within this hernia sac. The more inferior medial hernia sac contained unobstructed small bowel and measured 10.4 x 14.9 x 7.5 cm. Reassuringly, you have no abdominal complaints. No abdominal pain. Tolerating food. Passing gas and stool. No fever or rigors. Surgery was consulted to advise. Low clinical index of suspicion for incarceration at this time. No acute surgical intervention. You were also noted to have a paraesophageal lymph node mildly enlarged, may need further investigation of that with Gastroenterology, deferring until a bit further out from the pulmonary embolism. Please discuss further with your Primary Care provider and/or Hematology.

## 2025-02-28 NOTE — DISCHARGE NOTE PROVIDER - CARE PROVIDERS DIRECT ADDRESSES
,DirectAddress_Unknown,[email or web address],DirectAddress_Unknown ,DirectAddress_Unknown,[email or web address],DirectAddress_Unknown,[email or web address]

## 2025-02-28 NOTE — DISCHARGE NOTE PROVIDER - NSDCFUSCHEDAPPT_GEN_ALL_CORE_FT
Hari Alicia  [facility] Physician [facility]  CARDIOLOGY [address]  Scheduled Appointment: 03/12/2025

## 2025-02-28 NOTE — DISCHARGE NOTE PROVIDER - NSDCCAREPROVSEEN_GEN_ALL_CORE_FT
Shamar Joya (Critical Care Team)  Nano Montenegro (Medicine)  Morena Goodman (Cardiology)  Carroll Owens (Critical Care Team)  Gerald Rand (Urology)  Yeison Tang (Medicine)  Hari Alicia (Cardiology, PE Response Team)  Salina Gu (Hematology Oncology)  Damon Villagran (Cardiology

## 2025-02-28 NOTE — CONSULT NOTE ADULT - NS ATTEND AMEND GEN_ALL_CORE FT
This is a 70 year old male who is admitted with unprovoked extensive bilateral pulmonary emboli with right heart strain, s/p cath directed thrombolysis on 2/25.   No prior history of VTE, no family history of VTE.  No weight loss.   CTA chest with nonspecific lymph node adjacent to the distal esophagus.  Recommend to check CT abd/pelvis to rule out malignancy.   No objection to DOAC. Outpatient hypercoagulable work up.

## 2025-02-28 NOTE — PROGRESS NOTE ADULT - TIME BILLING
extensive review of patient's medical chart including prior hospital encounters, current admission progress notes, labs, imaging and other testing, seeing and evaluating patient at bedside, explaining to patient regarding current condition and plan of care, then ordering tests and collaborating with specialty consultants including PE Team (Vascular Cardiology) and Critical Care Team, and finally, documenting today's findings and plan.

## 2025-02-28 NOTE — PROGRESS NOTE ADULT - PROVIDER SPECIALTY LIST ADULT
Critical Care
Critical Care
Vascular Cardiology
Hospitalist
Intervent Cardiology
Vascular Cardiology
Vascular Cardiology
Critical Care
Hospitalist
Critical Care

## 2025-02-28 NOTE — DISCHARGE NOTE PROVIDER - CARE PROVIDER_API CALL
Hari Alicia  Interventional Cardiology  [address], [address]  [place] [address]  Phone: [phone]  Fax: [phone]  Scheduled Appointment: 03/12/2025 11:30 AM    Lalo Green  Cardiovascular Disease  [address] [place] [address]  Phone: [phone]  Fax: [phone]  Follow Up Time: 2 weeks    Sharyn Moon  Family Medicine  [address] [place] [address]  Phone: [phone]  Fax: [phone]  Follow Up Time: 2 weeks   Hari Alicia  Interventional Cardiology  [address], [address]  [place] [address]  Phone: [phone]  Fax: [phone]  Scheduled Appointment: 03/12/2025 11:30 AM    Lalo Green  Cardiovascular Disease  [address] [place] [address]  Phone: [phone]  Fax: [phone]  Follow Up Time: 2 weeks    Sharyn Moon  Family Medicine  [address] [place] [address]  Phone: [phone]  Fax: [phone]  Follow Up Time: 2 weeks    Salina Gu  Hematology  [address], [place] [address]  Phone: [phone]  Fax: [phone]  Follow Up Time: 2 weeks

## 2025-02-28 NOTE — DISCHARGE NOTE PROVIDER - NSDCPNSUBOBJ_GEN_ALL_CORE
Chief Complaint: Shortness of breath, dizziness, general weakness, diaphoresis    Interval Hx: Patient seen and examined. Reports feeling improved. Denies dyspnea, chest pain, chest tightness. No dizziness, lightheadedness or palpitations. Oxygenating well on room air. Ambulating comfortably. Did not meet criteria for home O2. Appreciate input from Hematology who recommended CT AP for completeness given unprovoked nature of PE. CT did show 2 large right lower abdominal hernias. The more superior lateral hernia sac contained unobstructed small bowel and measured approximately 10.9 x 8.8 x 6.3 cm. There was mild mesenteric edema within   this hernia sac. The more inferior medial hernia sac contained unobstructed small bowel and measured 10.4 x 14.9 x 7.5 cm. Patient has no abdominal complaints, however. No abdominal pain. Tolerating PO. Passing gas and stool. No fever or rigors. Surgery was consulted to advise.     ROS: Multi system review is comprehensively negative x  10 systems except as above    Vitals:  T(F): 97.2 (28 Feb 2025 07:42), Max: 98.2 (27 Feb 2025 16:00)  HR: 101 (28 Feb 2025 06:30) (100 - 110)  BP: 131/81 (28 Feb 2025 06:30) (115/74 - 131/81)  RR: 16 (27 Feb 2025 21:35) (12 - 19)  SpO2: 96% (28 Feb 2025 06:30) (93% - 96%) on room air    Exam:  Gen: Comfortably  HEENT: NCAT PERRL EOMI MMM clear oropharynx  Neck: Supple, no JVD, no LAD  CVS: s1 s2 normal, regular, rate ~100  Chest: Normal resp effort at rest, lungs CTA B/L  Abd: Non distended, +BS, soft, non tender  Ext: No edema. 2+ pulses   Skin: Warm, dry  Mood: Calm, pleasant  Neuro: A+OX3     Labs:                        14.6   12.40 )-------( 272                   44.1       136  |  103  |  20  ----------------------<  156  3.9   |   24   |  0.79    Ca   9.7      Phos  3.2      Mg   2.2    TPro  7.3  /  Alb  3.4  /  TBili  1.2  /  DBili  x   /  AST  14  /  ALT  23  /  AlkPhos  74     Troponin 792-->1535-->1366   ProBNP 449-->2672    A1c 7.9   Lactate 3.7-->3.0    DDimer 2491    Imaging:  CT A/P W/ 2/28: 2 large right lower abdominal hernias. The more superior lateral hernia sac contained unobstructed small bowel and measured approximately 10.9 x 8.8 x 6.3 cm. There was mild mesenteric edema within   this hernia sac. The more inferior medial hernia sac contained unobstructed small bowel and measured 10.4 x 14.9 x 7.5 cm.     US venous duplex B/L LE 2/24: No evidence of deep venous thrombosis in either lower extremity.     CTA chest PE protocol 2/24: Bilateral pulmonary thromboemboli in the right interlobar, and all lobar arteries, as well as multiple more distal branches. Right heart enlargement with RV: LV ratio greater than 1. No pericardial effusion. Coronary artery calcification. Normal aortic size. No endobronchial lesion. Clear lungs. No pleural effusion. 1cm short axis lymph node adjacent to the distal esophagus (2-103), prior 0.5cm. Multilevel spondylosis.    Cardiac Testing:  TTE 2/24: Technically difficult image quality. Left ventricular systolic function is normal with an ejection fraction visually estimated at 50 to 55 %. Mild left ventricular hypertrophy. Severely enlarged right ventricular cavity size and reduced right ventricular systolic function. Tricuspid annular plane systolic excursion (TAPSE) is 1.3 cm (normal >=1.7 cm). The right atrium is severely dilated. Moderate tricuspid regurgitation. Estimated pulmonary artery systolic pressure is 47 mmHg, consistent with mild to moderate pulmonary hypertension.    EKG 2/24: Sinus tachycardia, . Low voltage QRS RSR' or QR pattern in V1 suggests right ventricular conduction delay

## 2025-02-28 NOTE — DISCHARGE NOTE PROVIDER - PROVIDER TOKENS
PROVIDER:[TOKEN:[[id number]],SCHEDULEDAPPT:[03/12/2025],SCHEDULEDAPPTTIME:[11:30 AM]],PROVIDER:[TOKEN:[[id number]],FOLLOWUP:[2 weeks]],PROVIDER:[TOKEN:[[id number]],FOLLOWUP:[2 weeks]] PROVIDER:[TOKEN:[[id number]],SCHEDULEDAPPT:[03/12/2025],SCHEDULEDAPPTTIME:[11:30 AM]],PROVIDER:[TOKEN:[[id number]],FOLLOWUP:[2 weeks]],PROVIDER:[TOKEN:[[id number]],FOLLOWUP:[2 weeks]],PROVIDER:[TOKEN:[[id number]],FOLLOWUP:[2 weeks]]

## 2025-02-28 NOTE — PROGRESS NOTE ADULT - SUBJECTIVE AND OBJECTIVE BOX
CHIEF COMPLAINT/DIAGNOSIS:    HPI:Mr. Clark is a 71 y/o M, poor historian, with PMH CHF with unknown LVEF, anxiety, bipolar disorder, PAD, DM, HTN, prior OUD (reportedly last use 20 years ago, on suboxone 8mg/2mg BID) who presents to the ED BIBEMS 2/24 from home for shortness of breath, generalized weakness, diaphoresis, and dizziness that started on the day of presentation. Patient reports that he woke from sleep at 1300, and shortly after had sudden onset of SOB, lightheadedness, and diaphoresis. Patient denies recent surgery/recent travel, history of VTE, but endorses recent b/l ankle swelling.   Patient found to have b/l PE with associated RV strain. No evidence of deep venous thrombosis in either lower extremity.  S/P CDT (cathter directed thrombolysis) on 2/25.    Subjective/Observations: Pt. was seen and evaluated. Awaiting for PT to ambulate today. No dyspnea with ambulation yesterday with respiratory therapist, reported O2 sat 90% on RA, HR: 120s-130s while walking with walker, then recovered to 95% O2 sat. HR low 100s at rest. Denies CP, palpitation    REVIEW OF SYSTEMS: All other review of systems is negative unless indicated above      PHYSICAL EXAM:  Constitutional: NAD,sitting up in chair  Neuro: Alert and oriented x 3 Speech clear No focal deficits  Respiratory: CTAB  Cardiovascular: S1 and S2, RRR,   Gastrointestinal: BS+, soft, NT/ND  Extremities: No clubbing cyanosis or edema No varicosities  Vascular: 2+ peripheral pulses  Psychiatric: Normal mood, normal affect  Musculoskeletal: 5/5 strength b/l upper and lower extremities  Right antecubital procedure site ecchymotic; soft with mild tenderness to touch    Vital Signs Last 24 Hrs  T(C): 36.2 (28 Feb 2025 07:42), Max: 36.8 (27 Feb 2025 16:00)  T(F): 97.2 (28 Feb 2025 07:42), Max: 98.2 (27 Feb 2025 16:00)  HR: 103 (28 Feb 2025 09:41) (100 - 110)  BP: 113/77 (28 Feb 2025 09:41) (113/77 - 131/81)  BP(mean): 90 (28 Feb 2025 09:41) (82 - 95)  RR: 18 (28 Feb 2025 09:41) (12 - 18)  SpO2: 96% (28 Feb 2025 09:41) (95% - 96%)    Parameters below as of 28 Feb 2025 09:41  Patient On (Oxygen Delivery Method): room air        LABS: All Labs Reviewed:                        14.6   12.40 )-----------( 272      ( 27 Feb 2025 05:13 )             44.1     02-27    136  |  103  |  20  ----------------------------<  156[H]  3.9   |  24  |  0.79    Ca    9.7      27 Feb 2025 05:13    TPro  7.3  /  Alb  3.4  /  TBili  1.2  /  DBili  x   /  AST  14[L]  /  ALT  23  /  AlkPhos  74  02-27    PTT - ( 27 Feb 2025 05:13 )  PTT:35.3 sec            < from: CT Angio Chest PE Protocol w/ IV Cont (02.24.25 @ 16:32) >  IMPRESSION:    Bilateral pulmonary emboli involving all lobes, and associated right   heart strain. This was discussed with Dr. Novak at 4:49 PM on 2/4/2025,   with readback confirmation.    Increased size of nonspecific lymph node adjacent to the distal   esophagus. Recommend follow-up CT chest in 3-6 months.    < end of copied text >      Echo:  < from: TTE W or WO Ultrasound Enhancing Agent (02.24.25 @ 18:02) >  CONCLUSIONS:      1. Technically difficult image quality.   2. Left ventricular systolic function is normal with an ejection fraction visually estimated at 50 to 55 %.   3. Mild left ventricular hypertrophy.   4. Severely enlarged right ventricular cavity size and reduced right ventricular systolic function. Tricuspid annular plane systolic excursion (TAPSE) is 1.3 cm (normal >=1.7 cm).   5. The right atrium is severely dilated.   6. Moderate tricuspid regurgitation.   7. Estimated pulmonary artery systolic pressure is 47 mmHg, consistent with mild to moderate pulmonary hypertension.      < from: US Duplex Venous Lower Ext Complete, Bilateral (02.24.25 @ 18:06) >  RIGHT:  Normal compressibility of the RIGHT common femoral, femoral and popliteal   veins.  Doppler examination shows normal spontaneous and phasic flow.  No RIGHT calf vein thrombosis is detected.    Subcutaneous edema in the right calf.    LEFT:  Normal compressibility of the LEFT common femoral, femoral and popliteal   veins.  Doppler examination shows normal spontaneous and phasic flow.  No LEFT calf vein thrombosis is detected.    IMPRESSION:  No evidence of deep venous thrombosis in either lower extremity.    Cath:   < from: Invasive Peripheral Vascular Reporting (02.25.25 @ 10:04) >  Conclusions:   -Successful placement of 5French Lytic Catheters into the right and  leftpulmonary arteries  < end of copied text >      MEDICATIONS:  MEDICATIONS  (STANDING):  amphetamine/dextroamphetamine 20 milliGRAM(s) Oral three times a day  apixaban 10 milliGRAM(s) Oral two times a day  aspirin  chewable 81 milliGRAM(s) Oral daily  atorvastatin 40 milliGRAM(s) Oral at bedtime  buprenorphine 8 milliGRAM(s) SubLingual <User Schedule>  dextrose 5%. 1000 milliLiter(s) (100 mL/Hr) IV Continuous <Continuous>  dextrose 5%. 1000 milliLiter(s) (50 mL/Hr) IV Continuous <Continuous>  dextrose 50% Injectable 25 Gram(s) IV Push once  dextrose 50% Injectable 12.5 Gram(s) IV Push once  dextrose 50% Injectable 25 Gram(s) IV Push once  glucagon  Injectable 1 milliGRAM(s) IntraMuscular once  insulin lispro (ADMELOG) corrective regimen sliding scale   SubCutaneous Before meals and at bedtime  pantoprazole    Tablet 40 milliGRAM(s) Oral before breakfast      TELEMETRY REVIEW:ST    ASSESSMENT AND PLAN:  71 y/o M, poor historian, with PMH CHF with unknown LVEF, anxiety, bipolar disorder, PAD, DM, HTN, prior OUD (reportedly last use 20 years ago, on suboxone 8mg/2mg BID) who presents to the ED BIBEMS 2/24 from home for shortness of breath, generalized weakness, diaphoresis, and dizziness that started on the day of presentation. Found to have B/L PE with RHS.   No evidence of deep venous thrombosis in either lower extremity.  S/P CDT (cathter directed thrombolysis) on 2/25.  O2 at 96% on RA. Ambulated with PT; no dyspnea. Was transitioned to DOC  -Discharge home today  -follow up as an outpt, scheduled on 3/12/25 at 10 am

## 2025-02-28 NOTE — CONSULT NOTE ADULT - ASSESSMENT
71 y/o M currently admitted with acute SOB / ROMERO, found to have bilateral PEs involving all lobes with RHS, PERT on board, s/p thrombolytic therapy, currently in CCU setting.      # Acute SOB / ROMERO in setting of Bilateral Extensive PEs with RHS    - Acute symptoms in outpatient setting, CT imaging revealed extensive bilateral PEs with RHS ---> s/p cath directed thrombolysis with Vascular Cardio 02/25  - No evidence of DVTs on US imaging  - No obvious provoking factors noted in chart prior to this admission, should r/o possible underlying insidious malignant cause as CT imaging did reveal an increased size of nonspecific lymph node adjacent to the distal esophagus ---> should consider GI consult for possible EGD  - Recommend CT AP imaging for completeness, ordered / pending  - Hypercoagulable workup is appropriate but would defer to outpatient setting once outside of acute setting  - Continue supportive measures         Allen Merlos PA-C  Hematology Oncology  [facility]  [phone] 69 y/o M currently admitted with acute SOB / ROMERO, found to have bilateral PEs involving all lobes with RHS, PERT on board, s/p thrombolytic therapy, currently in CCU setting.      # Acute SOB / ROMERO in setting of Bilateral Extensive PEs with RHS    - Acute symptoms in outpatient setting, CT imaging revealed extensive bilateral PEs with RHS ---> s/p cath directed thrombolysis with Vascular Cardio 02/25  - No evidence of DVTs on US imaging  - No obvious provoking factors noted in chart prior to this admission, should r/o possible underlying insidious malignant cause as CT imaging did reveal an increased size of nonspecific lymph node adjacent to the distal esophagus ---> consider GI consult for possible EGD, but can be done outpatient  - Recommend CT AP imaging for completeness, ordered / pending  - Hypercoagulable workup is appropriate but would defer to outpatient setting once outside of acute setting  - Continue supportive measures         Allen Merlos PA-C  Hematology Oncology  [facility] [place]  [phone]

## 2025-02-28 NOTE — DISCHARGE NOTE PROVIDER - NSFTFHOMEHTHYNRD_GEN_ALL_CORE
Patient was encouraged to take 2000 International Units of vitamin D3 daily and 1000 mg of calcium on a daily basis as well  Continue to practice weight-bearing exercise  Yes

## 2025-02-28 NOTE — DISCHARGE NOTE PROVIDER - REASON FOR ADMISSION
PE Bilateral pulmonary embolism with right heart strain Bilateral pulmonary emboli with right heart strain

## 2025-02-28 NOTE — DISCHARGE NOTE PROVIDER - NSDCFUADDAPPT_GEN_ALL_CORE_FT
APPTS ARE READY TO BE MADE: [X] YES    Cardiology Dr. Lalo Green, within 1-2 weeks  Primary Care Dr. Sharyn Moon, within 1-2 weeks  Hematology, Dr. Salina Gu, within 1-2 weeks APPTS ARE READY TO BE MADE: [X] YES    Cardiology Dr. Lalo Green, within 1-2 weeks  Primary Care Dr. Sharyn Moon, within 1-2 weeks  Hematology, Dr. Salina Gu, within 1-2 weeks      Patient is scheduled to see Dr. Alicia on 3/12 at [address] [place] [address]    Patient was outreached but did not answer. A voicemail was left for the patient to return our call.

## 2025-02-28 NOTE — CONSULT NOTE ADULT - ASSESSMENT
69 yo M s/p catheter directed thrombolysis of PE's, surgery consulted after a CT a/p was performed showing findings of mesenteric edema and suggestive of possible incarceration   Physical exam findings unimpressive   Case and CT reviewed by Dr. Lobo  Low clinical index of suspicion for incarceration at this time  Pt encouraged f/u with PCP for referral for elective scheduling of abdominal hernia repair

## 2025-02-28 NOTE — PROGRESS NOTE ADULT - SUBJECTIVE AND OBJECTIVE BOX
Chief Complaint: Shortness of breath, dizziness, general weakness, diaphoresis    Interval Hx: Patient seen and examined. Reports feeling improved. Denies dyspnea, chest pain, chest tightness. No dizziness, lightheadedness or palpitations. Oxygenating well on room air. Ambulating comfortably. Did not meet criteria for home O2. Appreciate input from Hematology. For CT AP for completeness given unprovoked nature of PE.     ROS: Multi system review is comprehensively negative x  10 systems except as above    Vitals:  T(F): 97.2 (28 Feb 2025 07:42), Max: 98.2 (27 Feb 2025 16:00)  HR: 101 (28 Feb 2025 06:30) (100 - 110)  BP: 131/81 (28 Feb 2025 06:30) (115/74 - 131/81)  RR: 16 (27 Feb 2025 21:35) (12 - 19)  SpO2: 96% (28 Feb 2025 06:30) (93% - 96%) on room air    Exam:  Gen: Comfortably  HEENT: NCAT PERRL EOMI MMM clear oropharynx  Neck: Supple, no JVD, no LAD  CVS: s1 s2 normal, regular, rate ~100  Chest: Normal resp effort at rest, lungs CTA B/L  Abd: Non distended, +BS, soft, non tender  Ext: No edema. 2+ pulses   Skin: Warm, dry  Mood: Calm, pleasant  Neuro: A+OX3     Labs:                        14.6   12.40 )-------( 272                   44.1       136  |  103  |  20  ----------------------<  156  3.9   |   24   |  0.79    Ca   9.7      Phos  3.2      Mg   2.2    TPro  7.3  /  Alb  3.4  /  TBili  1.2  /  DBili  x   /  AST  14  /  ALT  23  /  AlkPhos  74     Troponin 792-->1535-->1366   ProBNP 449-->2672    A1c 7.9   Lactate 3.7-->3.0    DDimer [id number]    Imaging:  CT A/P W/ requested    US venous duplex B/L LE 2/24: No evidence of deep venous thrombosis in either lower extremity.     CTA chest PE protocol 2/24: Bilateral pulmonary thromboemboli in the right interlobar, and all lobar arteries, as well as multiple more distal branches. Right heart enlargement with RV: LV ratio greater than 1. No pericardial effusion. Coronary artery calcification. Normal aortic size. No endobronchial lesion. Clear lungs. No pleural effusion. 1cm short axis lymph node adjacent to the distal esophagus (2-103), prior 0.5cm. Multilevel spondylosis.    Cardiac Testing:  TTE 2/24: Technically difficult image quality. Left ventricular systolic function is normal with an ejection fraction visually estimated at 50 to 55 %. Mild left ventricular hypertrophy. Severely enlarged right ventricular cavity size and reduced right ventricular systolic function. Tricuspid annular plane systolic excursion (TAPSE) is 1.3 cm (normal >=1.7 cm). The right atrium is severely dilated. Moderate tricuspid regurgitation. Estimated pulmonary artery systolic pressure is 47 mmHg, consistent with mild to moderate pulmonary hypertension.    EKG 2/24: Sinus tachycardia, . Low voltage QRS RSR' or QR pattern in V1 suggests right ventricular conduction delay    Meds:  MEDICATIONS  (STANDING):  amphetamine/dextroamphetamine 20 milliGRAM(s) Oral three times a day  apixaban 10 milliGRAM(s) Oral two times a day  aspirin  chewable 81 milliGRAM(s) Oral daily  atorvastatin 40 milliGRAM(s) Oral at bedtime  buprenorphine 8 milliGRAM(s) SubLingual <User Schedule>  insulin lispro (ADMELOG) corrective regimen sliding scale   SubCutaneous Before meals and at bedtime  pantoprazole    Tablet 40 milliGRAM(s) Oral before breakfast    MEDICATIONS  (PRN):  dextrose Oral Gel 15 Gram(s) Oral once PRN Blood Glucose LESS THAN 70 milliGRAM(s)/deciliter

## 2025-02-28 NOTE — DISCHARGE NOTE PROVIDER - NSDCMRMEDTOKEN_GEN_ALL_CORE_FT
apixaban 5 mg oral tablet: 2 tab(s) orally 2 times a day Take 2 tab twice a day for 7 days then 1 tab twice a day thereafter  aspirin 81 mg oral tablet, chewable: 1 tab(s) orally once a day  atorvastatin 40 mg oral tablet: 1 tab(s) orally once a day  buprenorphine 8 mg sublingual tablet: 1 tab(s) sublingually 2 times a day  dextroamphetamine-amphetamine 20 mg oral tablet: 1 tab(s) orally 3 times a day  escitalopram 20 mg oral tablet: 1 tab(s) orally 2 times a day  metFORMIN 850 mg oral tablet: 1 tab(s) orally 2 times a day  pantoprazole 40 mg oral delayed release tablet: 1 tab(s) orally once a day  pregabalin 50 mg oral capsule: 1 cap(s) orally 2 times a day  Vitamin D3 25 mcg (1000 intl units) oral capsule: 1 cap(s) orally once a day   apixaban 5 mg oral tablet: 2 tab(s) orally 2 times a day Take 2 tab twice a day for 7 days then 1 tab twice a day thereafter  aspirin 81 mg oral tablet, chewable: 1 tab(s) orally once a day  atorvastatin 40 mg oral tablet: 1 tab(s) orally once a day  buprenorphine 8 mg sublingual tablet: 1 tab(s) sublingually 2 times a day  dextroamphetamine-amphetamine 20 mg oral tablet: 1 tab(s) orally 3 times a day  escitalopram 20 mg oral tablet: 1 tab(s) orally 2 times a day  metFORMIN 850 mg oral tablet: 1 tab(s) orally 2 times a day  Metoprolol Succinate ER 25 mg oral tablet, extended release: 0.5 tab(s) orally once a day (at bedtime)  pantoprazole 40 mg oral delayed release tablet: 1 tab(s) orally once a day  pregabalin 50 mg oral capsule: 1 cap(s) orally 2 times a day  Vitamin D3 25 mcg (1000 intl units) oral capsule: 1 cap(s) orally once a day

## 2025-02-28 NOTE — DISCHARGE NOTE PROVIDER - HOSPITAL COURSE
71 y/o man with DM, HTN, CHF, past opioid disorder on Suboxone, also with anxiety and bipolar disorder, BIBEMS to  ED on 2/24 for shortness of breath, diaphoresis, dizziness and generalized weakness, found to have B/L PE with associated RV strain. Patient denied recent surgery/recent travel, history of VTE, but endorsed recent b/l ankle swelling. Admitted to CCU. Patient has since undergone catheter directed thrombolysis. He is now doing well, stable. SPO2 97% at rest on room air, 90% on room air with ambulation. Tolerating anticoagulation well. Stable for downgrade to Medicine and approaching clinical stability for discharge.     Bilateral pulmonary embolism  Associated R heart strain. No DVT in either LE. S/P catheter directed thrombolysis (CDT) on 2/25. Patient currently stable, on room air, SPO2 95-97% at rest, 90% on room air with ambulation without dyspnea, dizziness, lightheadedness or palpitations. Per PE Response Team, patient switched to oral anticoagulation, tolerating that well. Patient has outpatient follow up 3/12/25 at 10AM with PE Team (Vascular Cardiology). Obtained CT abdomen and pelvis for completeness given unprovoked PE. CT showed no sign of malignancy. There were two lower abdominal hernias, see below. In addition to Vascular Cardiology follow up, please also follow up with Hematology as outpatient for completion of hypercoagulable work up as outpatient.    Elevated troponin  Likely myocardial demand ischemia without infarction in setting of B/L LE and R heart strain. Echo with preserved EF. Continue aspirin, statin.     Elevated lactate  In setting of hypoperfusion from B/L PE, R heart strain, RV dysfunction.     Diabetes mellitus  Somewhat controlled, a1c 7.9. Resume home regimen upon discharge. Follow up with Primary Care as outpatient.     HTN  BP low normal, only on low dose metoprolol for now. Closely after discharge, reassess regimen with Primary Care and or Cardiology. Dr Green's office to call for appointment.      HFpEF  ProBNP elevated though likely related to R heart strain from B/L PE burden. LVEF 50-55%. Severely enlarged RV cavity size and reduced RV systolic function. RA severely dilated. Moderate tricuspid regurgitation. Estimated pulmonary artery systolic pressure is 47 mmHg, consistent with mild to moderate pulmonary hypertension. Since received catheter directed thrombolysis and breathing is improved. No sign of decompensated CHF at this point. Appears euvolemic. BP is low normal, only on low dose metoprolol, other home cardiac meds on hold pending close follow up with Cardiology Dr. Green.     Two large right lower abdominal hernias  As noted on CT abdomen and pelvis. The more superior lateral hernia sac contained unobstructed small bowel and measured approximately 10.9 x 8.8 x 6.3 cm. There was mild mesenteric edema within this hernia sac. The more inferior medial hernia sac contained unobstructed small bowel and measured 10.4 x 14.9 x 7.5 cm. Reassuringly, patient has no abdominal complaints, however. No abdominal pain. Tolerating PO. Passing gas and stool. No fever or rigors. Surgery was consulted to advise. No acute surgical intervention.  Lastly, noted paraesophageal LN, may need further investigation of that with GI, deferring until a bit further out from the B/L PE with RV strain requiring CDT.     Past opioid use disorder  On suboxone    Anxiety, bipolar disorder  Stable     69 y/o man with DM, HTN, CHF, past opioid disorder on Suboxone, also with anxiety and bipolar disorder, BIBEMS to  ED on 2/24 for shortness of breath, diaphoresis, dizziness and generalized weakness, found to have B/L PE with associated RV strain. Patient denied recent surgery/recent travel, history of VTE, but endorsed recent b/l ankle swelling. Admitted to CCU. Patient has since undergone catheter directed thrombolysis. He is now doing well, stable. SPO2 97% at rest on room air, 90% on room air with ambulation. Tolerating anticoagulation well. Stable for downgrade to Medicine and approaching clinical stability for discharge.     Bilateral pulmonary embolism  Associated R heart strain. No DVT in either LE. S/P catheter directed thrombolysis (CDT) on 2/25. Patient currently stable, on room air, SPO2 95-97% at rest, 90% on room air with ambulation without dyspnea, dizziness, lightheadedness or palpitations. Per PE Response Team, patient switched to oral anticoagulation, tolerating that well. Patient has outpatient follow up 3/12/25 at 10AM with PE Team (Vascular Cardiology). Obtained CT abdomen and pelvis for completeness given unprovoked PE. CT showed no sign of malignancy. There were two lower abdominal hernias, see below. In addition to Vascular Cardiology follow up, please also follow up with Hematology as outpatient for completion of hypercoagulable work up as outpatient.    Elevated troponin  Likely myocardial demand ischemia without infarction in setting of B/L LE and R heart strain. Echo with preserved EF. Continue aspirin, statin.     Elevated lactate  In setting of hypoperfusion from B/L PE, R heart strain, RV dysfunction.     Diabetes mellitus  Somewhat controlled, a1c 7.9. Resume home regimen upon discharge. Follow up with Primary Care as outpatient.     HTN  BP low normal, only on low dose metoprolol for now. Closely after discharge, reassess regimen with Primary Care and or Cardiology. Dr Green's office to call for appointment.      HFpEF  ProBNP elevated though likely related to R heart strain from B/L PE burden. LVEF 50-55%. Severely enlarged RV cavity size and reduced RV systolic function. RA severely dilated. Moderate tricuspid regurgitation. Estimated pulmonary artery systolic pressure is 47 mmHg, consistent with mild to moderate pulmonary hypertension. Since received catheter directed thrombolysis and breathing is improved. No sign of decompensated CHF at this point. Appears euvolemic. BP is low normal, only on low dose metoprolol, other home cardiac meds on hold pending close follow up with Cardiology Dr. Green.     Two large right lower abdominal hernias  As noted on CT abdomen and pelvis. The more superior lateral hernia sac contained unobstructed small bowel and measured approximately 10.9 x 8.8 x 6.3 cm. There was mild mesenteric edema within this hernia sac. The more inferior medial hernia sac contained unobstructed small bowel and measured 10.4 x 14.9 x 7.5 cm. Reassuringly, patient has no abdominal complaints, however. No abdominal pain. Tolerating PO. Passing gas and stool. No fever or rigors. Surgery was consulted. Low clinical index of suspicion for incarceration at this time. No acute surgical intervention.  Lastly, noted paraesophageal LN, may need further investigation of that with GI, deferring until a bit further out from the B/L PE with RV strain requiring CDT.     Past opioid use disorder  On suboxone    Anxiety, bipolar disorder  Stable

## 2025-02-28 NOTE — CONSULT NOTE ADULT - SUBJECTIVE AND OBJECTIVE BOX
HPI:    69 y/o M, poor historian, with a PMHx of CHF with unknown LVEF, anxiety / bipolar, PAD, DM, HTN, prior opiate abuse reportedly last use x20 years ago currently on Suboxone 8mg / 2mg BID now presented to the ED BIBEMS 02/24 from home for acute SOB / ROMERO, generalized weakness, diaphoresis, and dizziness that started on the day of presentation.     02/28/25: To be seen      PAST MEDICAL & SURGICAL HISTORY:    Poor historian    HTN (hypertension)    PAD (peripheral artery disease)    Bipolar disorder    Anxiety    CHF with unknown LVEF    H/O hernia repair    H/O knee surgery        MEDICATIONS  (STANDING):    amphetamine/dextroamphetamine 20 milliGRAM(s) Oral three times a day  apixaban 10 milliGRAM(s) Oral two times a day  aspirin  chewable 81 milliGRAM(s) Oral daily  atorvastatin 40 milliGRAM(s) Oral at bedtime  buprenorphine 8 milliGRAM(s) SubLingual <User Schedule>  dextrose 5%. 1000 milliLiter(s) (100 mL/Hr) IV Continuous <Continuous>  dextrose 5%. 1000 milliLiter(s) (50 mL/Hr) IV Continuous <Continuous>  dextrose 50% Injectable 25 Gram(s) IV Push once  dextrose 50% Injectable 12.5 Gram(s) IV Push once  dextrose 50% Injectable 25 Gram(s) IV Push once  glucagon  Injectable 1 milliGRAM(s) IntraMuscular once  insulin lispro (ADMELOG) corrective regimen sliding scale   SubCutaneous Before meals and at bedtime  pantoprazole    Tablet 40 milliGRAM(s) Oral before breakfast      MEDICATIONS  (PRN):    dextrose Oral Gel 15 Gram(s) Oral once PRN Blood Glucose LESS THAN 70 milliGRAM(s)/deciliter      ALLERGIES:    No Known Allergies      FAMILY HISTORY:    heart attack (Father) AT 52      REVIEW OF SYSTEMS:    Constitutional, Eyes, ENT, Cardiovascular, Respiratory, Gastrointestinal, Genitourinary, Musculoskeletal, Integumentary, Neurological, Psychiatric, Endocrine, Heme/Lymph and Allergic/Immunologic review of systems are otherwise negative except as noted in HPI.       VITALS:    T(C): 36.7 (28 Feb 2025 06:27), Max: 36.8 (27 Feb 2025 16:00)  T(F): 98 (28 Feb 2025 06:27), Max: 98.2 (27 Feb 2025 16:00)  HR: 101 (28 Feb 2025 06:30) (100 - 110)  BP: 131/81 (28 Feb 2025 06:30) (115/74 - 131/81)  BP(mean): 91 (28 Feb 2025 06:30) (82 - 121)  RR: 16 (27 Feb 2025 21:35) (11 - 19)  SpO2: 96% (28 Feb 2025 06:30) (91% - 96%)    Parameters below as of 28 Feb 2025 06:30  Patient On (Oxygen Delivery Method): room air      PHYSICAL:    Constitutional:  Eyes: no conjunctival infection, anicteric  ENT: pharynx is unremarkable  Neck: supple without JVD  Pulmonary: clear to auscultation bilaterally  Cardiac: RRR  Vascular: no calf tenderness, venous stasis changes  Abdomen: normoactive bowel sounds, soft and nontender   Lymphatic: no peripheral adenopathy appreciated  Musculoskeletal: full range of motion and no deformities appreciated  Skin: normal appearance, no rash   Neurology:        LABS:    CBC Full  -  ( 27 Feb 2025 05:13 )  WBC Count : 12.40 K/uL  RBC Count : 5.17 M/uL  Hemoglobin : 14.6 g/dL  Hematocrit : 44.1 %  Platelet Count - Automated : 272 K/uL  Mean Cell Volume : 85.3 fl  Mean Cell Hemoglobin : 28.2 pg  Mean Cell Hemoglobin Concentration : 33.1 g/dL  Auto Neutrophil # : x  Auto Lymphocyte # : x  Auto Monocyte # : x  Auto Eosinophil # : x  Auto Basophil # : x  Auto Neutrophil % : x  Auto Lymphocyte % : x  Auto Monocyte % : x  Auto Eosinophil % : x  Auto Basophil % : x    02-27    136  |  103  |  20  ----------------------------<  156[H]  3.9   |  24  |  0.79    Ca    9.7      27 Feb 2025 05:13  Phos  3.2     02-26  Mg     2.2     02-26    TPro  7.3  /  Alb  3.4  /  TBili  1.2  /  DBili  x   /  AST  14[L]  /  ALT  23  /  AlkPhos  74  02-27    PTT - ( 27 Feb 2025 05:13 )  PTT:35.3 sec  Urinalysis Basic - ( 27 Feb 2025 05:13 )    Color: x / Appearance: x / SG: x / pH: x  Gluc: 156 mg/dL / Ketone: x  / Bili: x / Urobili: x   Blood: x / Protein: x / Nitrite: x   Leuk Esterase: x / RBC: x / WBC x   Sq Epi: x / Non Sq Epi: x / Bacteria: x        RADIOLOGY & ADDITIONAL STUDIES:      CT Angio Chest PE Protocol w/ IV Cont (02.24.25 @ 16:32)    FINDINGS:    HEART/VASCULATURE: Bilateral pulmonary thromboemboli in the right   interlobar, and all lobar arteries, as well as multiple more distal   branches. Right heart enlargement with RV: LV ratio greater than 1. No   pericardial effusion. Coronary artery calcification. Normal aortic size.    LUNGS/AIRWAYS/PLEURA: No endobronchial lesion. Clear lungs. No pleural   effusion.    LYMPH NODES/MEDIASTINUM: 1 cm short axis lymph node adjacent to the   distal esophagus (2-103), prior 0.5 cm.    UPPER ABDOMEN: Unremarkable.    BONES/SOFT TISSUES: Multilevel spondylosis.      IMPRESSION:    Bilateral pulmonary emboli involving all lobes, and associated right   heart strain.    Increased size of nonspecific lymph node adjacent to the distal   esophagus. Recommend follow-up CT chest in 3-6 months.        US Duplex Venous Lower Ext Complete, Bilateral (02.24.25 @ 18:06)    FINDINGS:    RIGHT:  Normal compressibility of the RIGHT common femoral, femoral and popliteal   veins.  Doppler examination shows normal spontaneous and phasic flow.  No RIGHT calf vein thrombosis is detected.    Subcutaneous edema in the right calf.    LEFT:  Normal compressibility of the LEFT common femoral, femoral and popliteal   veins.  Doppler examination shows normal spontaneous and phasic flow.  No LEFT calf vein thrombosis is detected.    IMPRESSION:  No evidence of deep venous thrombosis in either lower extremity.       HPI:    69 y/o M, poor historian, with a PMHx of CHF with unknown LVEF, anxiety / bipolar, PAD, DM, HTN, prior opiate abuse reportedly last use x20 years ago currently on Suboxone 8mg / 2mg BID now presented to the ED BIBEMS 02/24 from home for acute SOB / ROMERO, generalized weakness, diaphoresis, and dizziness that started on the day of presentation.     02/28/25: Seen at bedside, overall very pleasant, understanding of our teams involvement      PAST MEDICAL & SURGICAL HISTORY:    Poor historian    HTN (hypertension)    PAD (peripheral artery disease)    Bipolar disorder    Anxiety    CHF with unknown LVEF    H/O hernia repair    H/O knee surgery        MEDICATIONS  (STANDING):    amphetamine/dextroamphetamine 20 milliGRAM(s) Oral three times a day  apixaban 10 milliGRAM(s) Oral two times a day  aspirin  chewable 81 milliGRAM(s) Oral daily  atorvastatin 40 milliGRAM(s) Oral at bedtime  buprenorphine 8 milliGRAM(s) SubLingual <User Schedule>  dextrose 5%. 1000 milliLiter(s) (100 mL/Hr) IV Continuous <Continuous>  dextrose 5%. 1000 milliLiter(s) (50 mL/Hr) IV Continuous <Continuous>  dextrose 50% Injectable 25 Gram(s) IV Push once  dextrose 50% Injectable 12.5 Gram(s) IV Push once  dextrose 50% Injectable 25 Gram(s) IV Push once  glucagon  Injectable 1 milliGRAM(s) IntraMuscular once  insulin lispro (ADMELOG) corrective regimen sliding scale   SubCutaneous Before meals and at bedtime  pantoprazole    Tablet 40 milliGRAM(s) Oral before breakfast      MEDICATIONS  (PRN):    dextrose Oral Gel 15 Gram(s) Oral once PRN Blood Glucose LESS THAN 70 milliGRAM(s)/deciliter      ALLERGIES:    No Known Allergies      FAMILY HISTORY:    heart attack (Father) AT 52      REVIEW OF SYSTEMS:    Constitutional, Eyes, ENT, Cardiovascular, Respiratory, Gastrointestinal, Genitourinary, Musculoskeletal, Integumentary, Neurological, Psychiatric, Endocrine, Heme/Lymph and Allergic/Immunologic review of systems are otherwise negative except as noted in HPI.       VITALS:    T(C): 36.7 (28 Feb 2025 06:27), Max: 36.8 (27 Feb 2025 16:00)  T(F): 98 (28 Feb 2025 06:27), Max: 98.2 (27 Feb 2025 16:00)  HR: 101 (28 Feb 2025 06:30) (100 - 110)  BP: 131/81 (28 Feb 2025 06:30) (115/74 - 131/81)  BP(mean): 91 (28 Feb 2025 06:30) (82 - 121)  RR: 16 (27 Feb 2025 21:35) (11 - 19)  SpO2: 96% (28 Feb 2025 06:30) (91% - 96%)    Parameters below as of 28 Feb 2025 06:30  Patient On (Oxygen Delivery Method): room air      PHYSICAL:    Constitutional: no acute distress   Eyes: no conjunctival infection, anicteric  ENT: pharynx is unremarkable  Neck: supple without JVD  Pulmonary: clear to auscultation bilaterally  Cardiac: RRR  Vascular: no calf tenderness, venous stasis changes  Abdomen: normoactive bowel sounds, soft and nontender   Lymphatic: no peripheral adenopathy appreciated  Musculoskeletal: full range of motion and no deformities appreciated  Skin: normal appearance, no rash   Neurology: awake, alert, oriented       LABS:    CBC Full  -  ( 27 Feb 2025 05:13 )  WBC Count : 12.40 K/uL  RBC Count : 5.17 M/uL  Hemoglobin : 14.6 g/dL  Hematocrit : 44.1 %  Platelet Count - Automated : 272 K/uL  Mean Cell Volume : 85.3 fl  Mean Cell Hemoglobin : 28.2 pg  Mean Cell Hemoglobin Concentration : 33.1 g/dL  Auto Neutrophil # : x  Auto Lymphocyte # : x  Auto Monocyte # : x  Auto Eosinophil # : x  Auto Basophil # : x  Auto Neutrophil % : x  Auto Lymphocyte % : x  Auto Monocyte % : x  Auto Eosinophil % : x  Auto Basophil % : x    02-27    136  |  103  |  20  ----------------------------<  156[H]  3.9   |  24  |  0.79    Ca    9.7      27 Feb 2025 05:13  Phos  3.2     02-26  Mg     2.2     02-26    TPro  7.3  /  Alb  3.4  /  TBili  1.2  /  DBili  x   /  AST  14[L]  /  ALT  23  /  AlkPhos  74  02-27    PTT - ( 27 Feb 2025 05:13 )  PTT:35.3 sec  Urinalysis Basic - ( 27 Feb 2025 05:13 )    Color: x / Appearance: x / SG: x / pH: x  Gluc: 156 mg/dL / Ketone: x  / Bili: x / Urobili: x   Blood: x / Protein: x / Nitrite: x   Leuk Esterase: x / RBC: x / WBC x   Sq Epi: x / Non Sq Epi: x / Bacteria: x        RADIOLOGY & ADDITIONAL STUDIES:      CT Angio Chest PE Protocol w/ IV Cont (02.24.25 @ 16:32)    FINDINGS:    HEART/VASCULATURE: Bilateral pulmonary thromboemboli in the right   interlobar, and all lobar arteries, as well as multiple more distal   branches. Right heart enlargement with RV: LV ratio greater than 1. No   pericardial effusion. Coronary artery calcification. Normal aortic size.    LUNGS/AIRWAYS/PLEURA: No endobronchial lesion. Clear lungs. No pleural   effusion.    LYMPH NODES/MEDIASTINUM: 1 cm short axis lymph node adjacent to the   distal esophagus (2-103), prior 0.5 cm.    UPPER ABDOMEN: Unremarkable.    BONES/SOFT TISSUES: Multilevel spondylosis.      IMPRESSION:    Bilateral pulmonary emboli involving all lobes, and associated right   heart strain.    Increased size of nonspecific lymph node adjacent to the distal   esophagus. Recommend follow-up CT chest in 3-6 months.        US Duplex Venous Lower Ext Complete, Bilateral (02.24.25 @ 18:06)    FINDINGS:    RIGHT:  Normal compressibility of the RIGHT common femoral, femoral and popliteal   veins.  Doppler examination shows normal spontaneous and phasic flow.  No RIGHT calf vein thrombosis is detected.    Subcutaneous edema in the right calf.    LEFT:  Normal compressibility of the LEFT common femoral, femoral and popliteal   veins.  Doppler examination shows normal spontaneous and phasic flow.  No LEFT calf vein thrombosis is detected.    IMPRESSION:  No evidence of deep venous thrombosis in either lower extremity.

## 2025-03-09 DIAGNOSIS — Z79.84 LONG TERM (CURRENT) USE OF ORAL HYPOGLYCEMIC DRUGS: ICD-10-CM

## 2025-03-09 DIAGNOSIS — Z79.899 OTHER LONG TERM (CURRENT) DRUG THERAPY: ICD-10-CM

## 2025-03-09 DIAGNOSIS — I24.89 OTHER FORMS OF ACUTE ISCHEMIC HEART DISEASE: ICD-10-CM

## 2025-03-09 DIAGNOSIS — I27.20 PULMONARY HYPERTENSION, UNSPECIFIED: ICD-10-CM

## 2025-03-09 DIAGNOSIS — I11.0 HYPERTENSIVE HEART DISEASE WITH HEART FAILURE: ICD-10-CM

## 2025-03-09 DIAGNOSIS — E11.51 TYPE 2 DIABETES MELLITUS WITH DIABETIC PERIPHERAL ANGIOPATHY WITHOUT GANGRENE: ICD-10-CM

## 2025-03-09 DIAGNOSIS — I26.99 OTHER PULMONARY EMBOLISM WITHOUT ACUTE COR PULMONALE: ICD-10-CM

## 2025-03-09 DIAGNOSIS — R74.02 ELEVATION OF LEVELS OF LACTIC ACID DEHYDROGENASE [LDH]: ICD-10-CM

## 2025-03-09 DIAGNOSIS — J96.01 ACUTE RESPIRATORY FAILURE WITH HYPOXIA: ICD-10-CM

## 2025-03-09 DIAGNOSIS — Z79.82 LONG TERM (CURRENT) USE OF ASPIRIN: ICD-10-CM

## 2025-03-09 DIAGNOSIS — F11.20 OPIOID DEPENDENCE, UNCOMPLICATED: ICD-10-CM

## 2025-03-09 DIAGNOSIS — E11.65 TYPE 2 DIABETES MELLITUS WITH HYPERGLYCEMIA: ICD-10-CM

## 2025-03-09 DIAGNOSIS — I50.32 CHRONIC DIASTOLIC (CONGESTIVE) HEART FAILURE: ICD-10-CM

## 2025-03-09 DIAGNOSIS — I07.1 RHEUMATIC TRICUSPID INSUFFICIENCY: ICD-10-CM

## 2025-03-11 RX ORDER — CHOLECALCIFEROL (VITAMIN D3) 25 MCG
25 TABLET ORAL DAILY
Refills: 0 | Status: ACTIVE | COMMUNITY

## 2025-03-11 RX ORDER — ATORVASTATIN CALCIUM 40 MG/1
40 TABLET, FILM COATED ORAL DAILY
Refills: 0 | Status: ACTIVE | COMMUNITY

## 2025-03-11 RX ORDER — PREGABALIN 50 MG/1
50 CAPSULE ORAL TWICE DAILY
Refills: 0 | Status: ACTIVE | COMMUNITY

## 2025-03-11 RX ORDER — METOPROLOL SUCCINATE 25 MG/1
25 TABLET, EXTENDED RELEASE ORAL DAILY
Refills: 0 | Status: ACTIVE | COMMUNITY

## 2025-03-11 RX ORDER — APIXABAN 5 MG/1
5 TABLET, FILM COATED ORAL TWICE DAILY
Refills: 0 | Status: ACTIVE | COMMUNITY

## 2025-03-11 RX ORDER — PANTOPRAZOLE 40 MG/1
40 TABLET, DELAYED RELEASE ORAL
Refills: 0 | Status: ACTIVE | COMMUNITY

## 2025-03-11 RX ORDER — ESCITALOPRAM OXALATE 20 MG/1
20 TABLET ORAL TWICE DAILY
Refills: 0 | Status: ACTIVE | COMMUNITY

## 2025-03-11 RX ORDER — BUPRENORPHINE HYDROCHLORIDE 8 MG/1
8 TABLET SUBLINGUAL TWICE DAILY
Refills: 0 | Status: ACTIVE | COMMUNITY

## 2025-03-11 RX ORDER — DEXTROAMPHETAMINE SACCHARATE, AMPHETAMINE ASPARTATE, DEXTROAMPHETAMINE SULFATE, AND AMPHETAMINE SULFATE 5; 5; 5; 5 MG/1; MG/1; MG/1; MG/1
20 TABLET ORAL
Refills: 0 | Status: ACTIVE | COMMUNITY

## 2025-03-11 RX ORDER — ASPIRIN 81 MG
81 TABLET, DELAYED RELEASE (ENTERIC COATED) ORAL
Refills: 0 | Status: ACTIVE | COMMUNITY

## 2025-03-11 RX ORDER — METFORMIN HYDROCHLORIDE 850 MG/1
850 TABLET, COATED ORAL TWICE DAILY
Refills: 0 | Status: ACTIVE | COMMUNITY

## 2025-03-12 ENCOUNTER — APPOINTMENT (OUTPATIENT)
Dept: CARDIOLOGY | Facility: CLINIC | Age: 70
End: 2025-03-12
Payer: MEDICARE

## 2025-03-12 VITALS
SYSTOLIC BLOOD PRESSURE: 140 MMHG | HEIGHT: 72 IN | HEART RATE: 84 BPM | DIASTOLIC BLOOD PRESSURE: 84 MMHG | OXYGEN SATURATION: 96 % | WEIGHT: 285 LBS | BODY MASS INDEX: 38.6 KG/M2

## 2025-03-12 DIAGNOSIS — I26.99 OTHER PULMONARY EMBOLISM W/OUT ACUTE COR PULMONALE: ICD-10-CM

## 2025-03-12 PROCEDURE — 99215 OFFICE O/P EST HI 40 MIN: CPT

## 2025-03-12 PROCEDURE — 93000 ELECTROCARDIOGRAM COMPLETE: CPT

## 2025-03-19 ENCOUNTER — APPOINTMENT (OUTPATIENT)
Dept: CARDIOLOGY | Facility: CLINIC | Age: 70
End: 2025-03-19

## 2025-03-19 NOTE — ED ADULT NURSE NOTE - CAS EDN INTEG ASSESS
Pt called in stating stating since coming from the hospital on the new medication he has had diarrhea, no sleep, and increase heart rate. Was seen today at cardiac rehab they checked his pulse and everything was fine. Would like a call back leaving to go to [place] on Friday. Good call back number [phone]  
WDL

## 2025-04-16 ENCOUNTER — NON-APPOINTMENT (OUTPATIENT)
Age: 70
End: 2025-04-16

## 2025-05-13 NOTE — DISCHARGE NOTE PROVIDER - DISCHARGE SERVICE FOR PATIENT
on the discharge service for the patient. I have reviewed and made amendments to the documentation where necessary.
x1/5 feet
bed to chair/5 feet

## 2025-08-06 ENCOUNTER — APPOINTMENT (OUTPATIENT)
Dept: CARDIOLOGY | Facility: CLINIC | Age: 70
End: 2025-08-06
Payer: MEDICARE

## 2025-08-06 VITALS
BODY MASS INDEX: 39.14 KG/M2 | SYSTOLIC BLOOD PRESSURE: 136 MMHG | HEIGHT: 72 IN | OXYGEN SATURATION: 96 % | HEART RATE: 89 BPM | WEIGHT: 289 LBS | DIASTOLIC BLOOD PRESSURE: 73 MMHG

## 2025-08-06 PROCEDURE — 99215 OFFICE O/P EST HI 40 MIN: CPT

## 2025-08-06 PROCEDURE — G2211 COMPLEX E/M VISIT ADD ON: CPT

## 2025-08-06 PROCEDURE — 93000 ELECTROCARDIOGRAM COMPLETE: CPT
